# Patient Record
Sex: MALE | Race: WHITE | NOT HISPANIC OR LATINO | Employment: FULL TIME | URBAN - METROPOLITAN AREA
[De-identification: names, ages, dates, MRNs, and addresses within clinical notes are randomized per-mention and may not be internally consistent; named-entity substitution may affect disease eponyms.]

---

## 2017-02-10 ENCOUNTER — ALLSCRIPTS OFFICE VISIT (OUTPATIENT)
Dept: OTHER | Facility: OTHER | Age: 52
End: 2017-02-10

## 2017-02-20 ENCOUNTER — GENERIC CONVERSION - ENCOUNTER (OUTPATIENT)
Dept: OTHER | Facility: OTHER | Age: 52
End: 2017-02-20

## 2017-02-20 ENCOUNTER — ANESTHESIA (OUTPATIENT)
Dept: GASTROENTEROLOGY | Facility: HOSPITAL | Age: 52
End: 2017-02-20
Payer: COMMERCIAL

## 2017-02-20 ENCOUNTER — HOSPITAL ENCOUNTER (OUTPATIENT)
Facility: HOSPITAL | Age: 52
Setting detail: OUTPATIENT SURGERY
Discharge: HOME | End: 2017-02-20
Attending: INTERNAL MEDICINE | Admitting: INTERNAL MEDICINE
Payer: COMMERCIAL

## 2017-02-20 ENCOUNTER — ANESTHESIA EVENT (OUTPATIENT)
Dept: GASTROENTEROLOGY | Facility: HOSPITAL | Age: 52
End: 2017-02-20
Payer: COMMERCIAL

## 2017-02-20 VITALS
WEIGHT: 202.82 LBS | HEART RATE: 51 BPM | HEIGHT: 69 IN | RESPIRATION RATE: 16 BRPM | SYSTOLIC BLOOD PRESSURE: 113 MMHG | DIASTOLIC BLOOD PRESSURE: 70 MMHG | OXYGEN SATURATION: 98 % | BODY MASS INDEX: 30.04 KG/M2 | TEMPERATURE: 96.8 F

## 2017-02-20 DIAGNOSIS — Z80.0 FAMILY HISTORY OF MALIGNANT NEOPLASM OF DIGESTIVE ORGAN: ICD-10-CM

## 2017-02-20 PROCEDURE — 88305 TISSUE EXAM BY PATHOLOGIST: CPT | Performed by: INTERNAL MEDICINE

## 2017-02-20 RX ORDER — SODIUM CHLORIDE, SODIUM LACTATE, POTASSIUM CHLORIDE, CALCIUM CHLORIDE 600; 310; 30; 20 MG/100ML; MG/100ML; MG/100ML; MG/100ML
125 INJECTION, SOLUTION INTRAVENOUS CONTINUOUS
Status: DISCONTINUED | OUTPATIENT
Start: 2017-02-20 | End: 2017-02-20 | Stop reason: HOSPADM

## 2017-02-20 RX ORDER — AMOXICILLIN 500 MG
CAPSULE ORAL DAILY
COMMUNITY
End: 2017-08-10

## 2017-02-20 RX ORDER — LISINOPRIL 20 MG/1
10 TABLET ORAL
COMMUNITY
End: 2018-05-25 | Stop reason: ALTCHOICE

## 2017-02-20 RX ORDER — ATORVASTATIN CALCIUM 20 MG/1
20 TABLET, FILM COATED ORAL
COMMUNITY
End: 2018-07-05 | Stop reason: SDUPTHER

## 2017-02-20 RX ORDER — PROPOFOL 10 MG/ML
INJECTION, EMULSION INTRAVENOUS AS NEEDED
Status: DISCONTINUED | OUTPATIENT
Start: 2017-02-20 | End: 2017-02-20 | Stop reason: SURG

## 2017-02-20 RX ORDER — MEPERIDINE HYDROCHLORIDE 25 MG/ML
12.5 INJECTION INTRAMUSCULAR; INTRAVENOUS; SUBCUTANEOUS AS NEEDED
Status: DISCONTINUED | OUTPATIENT
Start: 2017-02-20 | End: 2017-02-20 | Stop reason: HOSPADM

## 2017-02-20 RX ORDER — LEVOTHYROXINE SODIUM 112 UG/1
112 TABLET ORAL
COMMUNITY
End: 2018-04-11 | Stop reason: SDUPTHER

## 2017-02-20 RX ORDER — LEVOCETIRIZINE DIHYDROCHLORIDE 5 MG/1
5 TABLET, FILM COATED ORAL EVERY EVENING
COMMUNITY
End: 2018-04-29 | Stop reason: SDUPTHER

## 2017-02-20 RX ORDER — GLUCOSA SU 2KCL/CHONDROITIN SU 500-400 MG
1 CAPSULE ORAL DAILY
COMMUNITY
End: 2017-08-10

## 2017-02-20 RX ORDER — ALBUTEROL SULFATE 2.5 MG/3ML
2.5 SOLUTION RESPIRATORY (INHALATION) ONCE AS NEEDED
Status: DISCONTINUED | OUTPATIENT
Start: 2017-02-20 | End: 2017-02-20 | Stop reason: HOSPADM

## 2017-02-20 RX ORDER — MAG HYDROX/ALUMINUM HYD/SIMETH 400-400-40
SUSPENSION, ORAL (FINAL DOSE FORM) ORAL DAILY
COMMUNITY
End: 2017-08-10

## 2017-02-20 RX ADMIN — PROPOFOL 30 MG: 10 INJECTION, EMULSION INTRAVENOUS at 11:21

## 2017-02-20 RX ADMIN — PROPOFOL 50 MG: 10 INJECTION, EMULSION INTRAVENOUS at 11:11

## 2017-02-20 RX ADMIN — SODIUM CHLORIDE, SODIUM LACTATE, POTASSIUM CHLORIDE, AND CALCIUM CHLORIDE 125 ML/HR: .6; .31; .03; .02 INJECTION, SOLUTION INTRAVENOUS at 10:33

## 2017-02-20 RX ADMIN — PROPOFOL 30 MG: 10 INJECTION, EMULSION INTRAVENOUS at 11:12

## 2017-02-20 RX ADMIN — PROPOFOL 150 MG: 10 INJECTION, EMULSION INTRAVENOUS at 11:09

## 2017-02-20 RX ADMIN — PROPOFOL 40 MG: 10 INJECTION, EMULSION INTRAVENOUS at 11:16

## 2017-03-17 ENCOUNTER — OFFICE VISIT (OUTPATIENT)
Dept: URGENT CARE | Facility: MEDICAL CENTER | Age: 52
End: 2017-03-17
Payer: COMMERCIAL

## 2017-03-17 PROCEDURE — 99213 OFFICE O/P EST LOW 20 MIN: CPT

## 2017-05-05 ENCOUNTER — GENERIC CONVERSION - ENCOUNTER (OUTPATIENT)
Dept: OTHER | Facility: OTHER | Age: 52
End: 2017-05-05

## 2017-05-05 LAB
A/G RATIO (HISTORICAL): 1.9 (ref 1.2–2.2)
ALBUMIN SERPL BCP-MCNC: 4.5 G/DL (ref 3.5–5.5)
ALP SERPL-CCNC: 76 IU/L (ref 39–117)
ALT SERPL W P-5'-P-CCNC: 71 IU/L (ref 0–44)
AMBIG ABBREV CMP14 DEFAULT (HISTORICAL): NORMAL
AST SERPL W P-5'-P-CCNC: 39 IU/L (ref 0–40)
BILIRUB SERPL-MCNC: 0.6 MG/DL (ref 0–1.2)
BUN SERPL-MCNC: 18 MG/DL (ref 6–24)
BUN/CREA RATIO (HISTORICAL): 15 (ref 9–20)
CALCIUM SERPL-MCNC: 9.8 MG/DL (ref 8.7–10.2)
CHLORIDE SERPL-SCNC: 102 MMOL/L (ref 96–106)
CHOLEST SERPL-MCNC: 134 MG/DL (ref 100–199)
CO2 SERPL-SCNC: 26 MMOL/L (ref 18–29)
CREAT SERPL-MCNC: 1.21 MG/DL (ref 0.76–1.27)
EGFR AFRICAN AMERICAN (HISTORICAL): 81 ML/MIN/1.73
EGFR-AMERICAN CALC (HISTORICAL): 70 ML/MIN/1.73
GLUCOSE SERPL-MCNC: 101 MG/DL (ref 65–99)
HDLC SERPL-MCNC: 38 MG/DL
LDLC SERPL CALC-MCNC: 71 MG/DL (ref 0–99)
POTASSIUM SERPL-SCNC: 4.5 MMOL/L (ref 3.5–5.2)
SODIUM SERPL-SCNC: 144 MMOL/L (ref 134–144)
TOT. GLOBULIN, SERUM (HISTORICAL): 2.4 G/DL (ref 1.5–4.5)
TOTAL PROTEIN (HISTORICAL): 6.9 G/DL (ref 6–8.5)
TRIGL SERPL-MCNC: 126 MG/DL (ref 0–149)

## 2017-05-06 LAB — 25(OH)D3 SERPL-MCNC: 61.2 NG/ML (ref 30–100)

## 2017-05-08 ENCOUNTER — GENERIC CONVERSION - ENCOUNTER (OUTPATIENT)
Dept: OTHER | Facility: OTHER | Age: 52
End: 2017-05-08

## 2017-05-12 ENCOUNTER — GENERIC CONVERSION - ENCOUNTER (OUTPATIENT)
Dept: OTHER | Facility: OTHER | Age: 52
End: 2017-05-12

## 2017-05-12 DIAGNOSIS — E03.9 HYPOTHYROIDISM: ICD-10-CM

## 2017-05-12 DIAGNOSIS — E55.9 VITAMIN D DEFICIENCY: ICD-10-CM

## 2017-05-13 LAB — TSH SERPL DL<=0.05 MIU/L-ACNC: 2.18 UIU/ML (ref 0.45–4.5)

## 2017-05-15 ENCOUNTER — GENERIC CONVERSION - ENCOUNTER (OUTPATIENT)
Dept: OTHER | Facility: OTHER | Age: 52
End: 2017-05-15

## 2017-05-16 ENCOUNTER — ALLSCRIPTS OFFICE VISIT (OUTPATIENT)
Dept: OTHER | Facility: OTHER | Age: 52
End: 2017-05-16

## 2017-07-21 ENCOUNTER — DOCTOR'S OFFICE (OUTPATIENT)
Dept: URBAN - METROPOLITAN AREA CLINIC 137 | Facility: CLINIC | Age: 52
Setting detail: OPHTHALMOLOGY
End: 2017-07-21
Payer: COMMERCIAL

## 2017-07-21 DIAGNOSIS — H52.4: ICD-10-CM

## 2017-07-21 PROCEDURE — 92004 COMPRE OPH EXAM NEW PT 1/>: CPT | Performed by: OPHTHALMOLOGY

## 2017-07-21 ASSESSMENT — REFRACTION_MANIFEST
OS_VA1: 20/
OS_VA2: 20/
OU_VA: 20/
OD_VA2: 20/
OD_VA2: 20/
OS_VA2: 20/
OU_VA: 20/
OD_VA1: 20/
OD_VA3: 20/
OD_VA1: 20/
OD_VA3: 20/
OS_VA3: 20/
OS_VA3: 20/
OS_VA1: 20/

## 2017-07-21 ASSESSMENT — CONFRONTATIONAL VISUAL FIELD TEST (CVF)
OS_FINDINGS: FULL
OD_FINDINGS: FULL

## 2017-07-21 ASSESSMENT — REFRACTION_OUTSIDERX
OD_VA1: 20/20
OS_SPHERE: +0.25
OS_VA2: 20/20(J1+)
OS_CYLINDER: SPH
OD_VA2: 20/20(J1+)
OU_VA: 20/
OD_VA3: 20/
OD_ADD: +1.50
OD_SPHERE: PLANO
OS_VA1: 20/20
OS_VA3: 20/
OS_ADD: +1.50
OD_CYLINDER: SPH

## 2017-07-21 ASSESSMENT — REFRACTION_AUTOREFRACTION
OD_SPHERE: PLANO
OS_CYLINDER: SPH
OD_CYLINDER: +0.25
OD_AXIS: 013
OS_SPHERE: +0.25

## 2017-07-21 ASSESSMENT — REFRACTION_CURRENTRX
OS_SPHERE: +1.50
OD_SPHERE: +1.50
OS_VPRISM_DIRECTION: SV
OD_OVR_VA: 20/
OS_OVR_VA: 20/
OD_OVR_VA: 20/
OD_VPRISM_DIRECTION: SV
OS_CYLINDER: SPH
OD_CYLINDER: SPH
OS_OVR_VA: 20/
OD_OVR_VA: 20/
OS_OVR_VA: 20/

## 2017-07-21 ASSESSMENT — VISUAL ACUITY
OD_BCVA: 20/20
OS_BCVA: 20/20-2

## 2017-08-10 ENCOUNTER — HOSPITAL ENCOUNTER (EMERGENCY)
Facility: HOSPITAL | Age: 52
Discharge: HOME | End: 2017-08-11
Attending: EMERGENCY MEDICINE | Admitting: EMERGENCY MEDICINE
Payer: COMMERCIAL

## 2017-08-10 VITALS
HEART RATE: 69 BPM | OXYGEN SATURATION: 96 % | SYSTOLIC BLOOD PRESSURE: 145 MMHG | BODY MASS INDEX: 28.94 KG/M2 | RESPIRATION RATE: 20 BRPM | DIASTOLIC BLOOD PRESSURE: 86 MMHG | TEMPERATURE: 97.9 F | WEIGHT: 196 LBS

## 2017-08-10 DIAGNOSIS — S46.211A RUPTURE OF RIGHT DISTAL BICEPS TENDON: Primary | ICD-10-CM

## 2017-08-11 ENCOUNTER — TRANSCRIBE ORDERS (OUTPATIENT)
Dept: ADMINISTRATIVE | Facility: HOSPITAL | Age: 52
End: 2017-08-11

## 2017-08-11 ENCOUNTER — APPOINTMENT (EMERGENCY)
Dept: RADIOLOGY | Facility: HOSPITAL | Age: 52
End: 2017-08-11
Payer: COMMERCIAL

## 2017-08-11 ENCOUNTER — ALLSCRIPTS OFFICE VISIT (OUTPATIENT)
Dept: OTHER | Facility: OTHER | Age: 52
End: 2017-08-11

## 2017-08-11 DIAGNOSIS — S46.211A TRAUMATIC PARTIAL TEAR OF RIGHT BICEPS TENDON, INITIAL ENCOUNTER: Primary | ICD-10-CM

## 2017-08-11 DIAGNOSIS — S46.211A STRAIN OF MUSCLE, FASCIA AND TENDON OF OTHER PARTS OF BICEPS, RIGHT ARM, INITIAL ENCOUNTER: ICD-10-CM

## 2017-08-11 PROCEDURE — 99283 EMERGENCY DEPT VISIT LOW MDM: CPT

## 2017-08-11 PROCEDURE — 73090 X-RAY EXAM OF FOREARM: CPT

## 2017-08-11 PROCEDURE — 73080 X-RAY EXAM OF ELBOW: CPT

## 2017-08-11 RX ORDER — IBUPROFEN 600 MG/1
600 TABLET ORAL ONCE
Status: COMPLETED | OUTPATIENT
Start: 2017-08-11 | End: 2017-08-11

## 2017-08-11 RX ORDER — HYDROCODONE BITARTRATE AND ACETAMINOPHEN 5; 325 MG/1; MG/1
1 TABLET ORAL EVERY 6 HOURS PRN
Qty: 15 TABLET | Refills: 0 | Status: SHIPPED | OUTPATIENT
Start: 2017-08-11 | End: 2017-08-21

## 2017-08-11 RX ORDER — IBUPROFEN 800 MG/1
800 TABLET ORAL 3 TIMES DAILY
Qty: 21 TABLET | Refills: 0 | Status: SHIPPED | OUTPATIENT
Start: 2017-08-11 | End: 2018-09-27 | Stop reason: ALTCHOICE

## 2017-08-11 RX ADMIN — IBUPROFEN 600 MG: 600 TABLET ORAL at 00:10

## 2017-08-17 ENCOUNTER — HOSPITAL ENCOUNTER (OUTPATIENT)
Dept: RADIOLOGY | Age: 52
Discharge: HOME | End: 2017-08-17
Payer: COMMERCIAL

## 2017-08-17 DIAGNOSIS — S46.211A STRAIN OF MUSCLE, FASCIA AND TENDON OF OTHER PARTS OF BICEPS, RIGHT ARM, INITIAL ENCOUNTER: ICD-10-CM

## 2017-08-17 PROCEDURE — 73221 MRI JOINT UPR EXTREM W/O DYE: CPT

## 2017-08-25 ENCOUNTER — ALLSCRIPTS OFFICE VISIT (OUTPATIENT)
Dept: OTHER | Facility: OTHER | Age: 52
End: 2017-08-25

## 2017-08-31 ENCOUNTER — HOSPITAL ENCOUNTER (OUTPATIENT)
Facility: HOSPITAL | Age: 52
Setting detail: OUTPATIENT SURGERY
Discharge: HOME | End: 2017-08-31
Attending: ORTHOPAEDIC SURGERY | Admitting: ORTHOPAEDIC SURGERY
Payer: COMMERCIAL

## 2017-08-31 ENCOUNTER — ANESTHESIA (OUTPATIENT)
Dept: PERIOP | Facility: HOSPITAL | Age: 52
End: 2017-08-31
Payer: COMMERCIAL

## 2017-08-31 ENCOUNTER — APPOINTMENT (OUTPATIENT)
Dept: RADIOLOGY | Facility: HOSPITAL | Age: 52
End: 2017-08-31
Payer: COMMERCIAL

## 2017-08-31 ENCOUNTER — ANESTHESIA EVENT (OUTPATIENT)
Dept: PERIOP | Facility: HOSPITAL | Age: 52
End: 2017-08-31
Payer: COMMERCIAL

## 2017-08-31 VITALS
SYSTOLIC BLOOD PRESSURE: 123 MMHG | DIASTOLIC BLOOD PRESSURE: 74 MMHG | HEIGHT: 70 IN | HEART RATE: 77 BPM | TEMPERATURE: 97.6 F | OXYGEN SATURATION: 96 % | WEIGHT: 194 LBS | RESPIRATION RATE: 17 BRPM | BODY MASS INDEX: 27.77 KG/M2

## 2017-08-31 PROCEDURE — 73070 X-RAY EXAM OF ELBOW: CPT

## 2017-08-31 PROCEDURE — C1713 ANCHOR/SCREW BN/BN,TIS/BN: HCPCS | Performed by: ORTHOPAEDIC SURGERY

## 2017-08-31 DEVICE — SYSTEM IMPLANT DSTL BICEP REPAIR: Type: IMPLANTABLE DEVICE | Status: FUNCTIONAL

## 2017-08-31 RX ORDER — FENTANYL CITRATE/PF 50 MCG/ML
50 SYRINGE (ML) INJECTION
Status: COMPLETED | OUTPATIENT
Start: 2017-08-31 | End: 2017-08-31

## 2017-08-31 RX ORDER — KETOROLAC TROMETHAMINE 30 MG/ML
INJECTION, SOLUTION INTRAMUSCULAR; INTRAVENOUS AS NEEDED
Status: DISCONTINUED | OUTPATIENT
Start: 2017-08-31 | End: 2017-08-31 | Stop reason: SURG

## 2017-08-31 RX ORDER — EPHEDRINE SULFATE 50 MG/ML
INJECTION, SOLUTION INTRAVENOUS AS NEEDED
Status: DISCONTINUED | OUTPATIENT
Start: 2017-08-31 | End: 2017-08-31 | Stop reason: SURG

## 2017-08-31 RX ORDER — MORPHINE SULFATE 2 MG/ML
2 INJECTION, SOLUTION INTRAMUSCULAR; INTRAVENOUS EVERY 2 HOUR PRN
Status: DISCONTINUED | OUTPATIENT
Start: 2017-08-31 | End: 2017-08-31 | Stop reason: HOSPADM

## 2017-08-31 RX ORDER — LIDOCAINE HYDROCHLORIDE 10 MG/ML
INJECTION, SOLUTION INFILTRATION; PERINEURAL AS NEEDED
Status: DISCONTINUED | OUTPATIENT
Start: 2017-08-31 | End: 2017-08-31 | Stop reason: SURG

## 2017-08-31 RX ORDER — OXYCODONE HYDROCHLORIDE AND ACETAMINOPHEN 5; 325 MG/1; MG/1
TABLET ORAL
Qty: 40 TABLET | Refills: 0 | Status: SHIPPED | OUTPATIENT
Start: 2017-08-31 | End: 2018-05-25 | Stop reason: ALTCHOICE

## 2017-08-31 RX ORDER — ONDANSETRON 2 MG/ML
4 INJECTION INTRAMUSCULAR; INTRAVENOUS EVERY 6 HOURS PRN
Status: DISCONTINUED | OUTPATIENT
Start: 2017-08-31 | End: 2017-08-31 | Stop reason: HOSPADM

## 2017-08-31 RX ORDER — ONDANSETRON 2 MG/ML
INJECTION INTRAMUSCULAR; INTRAVENOUS AS NEEDED
Status: DISCONTINUED | OUTPATIENT
Start: 2017-08-31 | End: 2017-08-31 | Stop reason: SURG

## 2017-08-31 RX ORDER — FENTANYL CITRATE 50 UG/ML
INJECTION, SOLUTION INTRAMUSCULAR; INTRAVENOUS AS NEEDED
Status: DISCONTINUED | OUTPATIENT
Start: 2017-08-31 | End: 2017-08-31 | Stop reason: SURG

## 2017-08-31 RX ORDER — PROPOFOL 10 MG/ML
INJECTION, EMULSION INTRAVENOUS AS NEEDED
Status: DISCONTINUED | OUTPATIENT
Start: 2017-08-31 | End: 2017-08-31 | Stop reason: SURG

## 2017-08-31 RX ORDER — MIDAZOLAM HYDROCHLORIDE 1 MG/ML
INJECTION INTRAMUSCULAR; INTRAVENOUS AS NEEDED
Status: DISCONTINUED | OUTPATIENT
Start: 2017-08-31 | End: 2017-08-31 | Stop reason: SURG

## 2017-08-31 RX ORDER — OXYCODONE HYDROCHLORIDE AND ACETAMINOPHEN 5; 325 MG/1; MG/1
TABLET ORAL
Status: COMPLETED
Start: 2017-08-31 | End: 2017-08-31

## 2017-08-31 RX ORDER — OXYCODONE HYDROCHLORIDE AND ACETAMINOPHEN 5; 325 MG/1; MG/1
2 TABLET ORAL EVERY 4 HOURS PRN
Status: DISCONTINUED | OUTPATIENT
Start: 2017-08-31 | End: 2017-08-31 | Stop reason: HOSPADM

## 2017-08-31 RX ORDER — SODIUM CHLORIDE, SODIUM LACTATE, POTASSIUM CHLORIDE, CALCIUM CHLORIDE 600; 310; 30; 20 MG/100ML; MG/100ML; MG/100ML; MG/100ML
INJECTION, SOLUTION INTRAVENOUS CONTINUOUS PRN
Status: DISCONTINUED | OUTPATIENT
Start: 2017-08-31 | End: 2017-08-31 | Stop reason: SURG

## 2017-08-31 RX ORDER — ACETAMINOPHEN 325 MG/1
650 TABLET ORAL EVERY 4 HOURS PRN
Status: DISCONTINUED | OUTPATIENT
Start: 2017-08-31 | End: 2017-08-31 | Stop reason: HOSPADM

## 2017-08-31 RX ORDER — ONDANSETRON 2 MG/ML
4 INJECTION INTRAMUSCULAR; INTRAVENOUS ONCE AS NEEDED
Status: DISCONTINUED | OUTPATIENT
Start: 2017-08-31 | End: 2017-08-31 | Stop reason: HOSPADM

## 2017-08-31 RX ADMIN — DEXAMETHASONE SODIUM PHOSPHATE 4 MG: 10 INJECTION INTRAMUSCULAR; INTRAVENOUS at 14:50

## 2017-08-31 RX ADMIN — OXYCODONE HYDROCHLORIDE AND ACETAMINOPHEN 2 TABLET: 5; 325 TABLET ORAL at 17:41

## 2017-08-31 RX ADMIN — SODIUM CHLORIDE, SODIUM LACTATE, POTASSIUM CHLORIDE, AND CALCIUM CHLORIDE: .6; .31; .03; .02 INJECTION, SOLUTION INTRAVENOUS at 13:50

## 2017-08-31 RX ADMIN — FENTANYL CITRATE 50 MCG: 50 INJECTION INTRAMUSCULAR; INTRAVENOUS at 16:30

## 2017-08-31 RX ADMIN — ONDANSETRON 4 MG: 2 INJECTION INTRAMUSCULAR; INTRAVENOUS at 15:59

## 2017-08-31 RX ADMIN — PROPOFOL 200 MG: 10 INJECTION, EMULSION INTRAVENOUS at 14:46

## 2017-08-31 RX ADMIN — LIDOCAINE HYDROCHLORIDE 50 MG: 10 INJECTION, SOLUTION INFILTRATION; PERINEURAL at 14:46

## 2017-08-31 RX ADMIN — CEFAZOLIN SODIUM 2000 MG: 2 SOLUTION INTRAVENOUS at 14:50

## 2017-08-31 RX ADMIN — EPHEDRINE SULFATE 5 MG: 50 INJECTION, SOLUTION INTRAMUSCULAR; INTRAVENOUS; SUBCUTANEOUS at 14:50

## 2017-08-31 RX ADMIN — MIDAZOLAM HYDROCHLORIDE 2 MG: 1 INJECTION, SOLUTION INTRAMUSCULAR; INTRAVENOUS at 14:43

## 2017-08-31 RX ADMIN — HYDROMORPHONE HYDROCHLORIDE 0.5 MG: 1 INJECTION, SOLUTION INTRAMUSCULAR; INTRAVENOUS; SUBCUTANEOUS at 15:06

## 2017-08-31 RX ADMIN — EPHEDRINE SULFATE 5 MG: 50 INJECTION, SOLUTION INTRAMUSCULAR; INTRAVENOUS; SUBCUTANEOUS at 14:48

## 2017-08-31 RX ADMIN — FENTANYL CITRATE 50 MCG: 50 INJECTION INTRAMUSCULAR; INTRAVENOUS at 14:46

## 2017-08-31 RX ADMIN — HYDROMORPHONE HYDROCHLORIDE 0.4 MG: 1 INJECTION, SOLUTION INTRAMUSCULAR; INTRAVENOUS; SUBCUTANEOUS at 16:40

## 2017-08-31 RX ADMIN — FENTANYL CITRATE 50 MCG: 50 INJECTION INTRAMUSCULAR; INTRAVENOUS at 14:59

## 2017-08-31 RX ADMIN — HYDROMORPHONE HYDROCHLORIDE 0.5 MG: 1 INJECTION, SOLUTION INTRAMUSCULAR; INTRAVENOUS; SUBCUTANEOUS at 15:21

## 2017-08-31 RX ADMIN — FENTANYL CITRATE 50 MCG: 50 INJECTION INTRAMUSCULAR; INTRAVENOUS at 16:25

## 2017-08-31 RX ADMIN — KETOROLAC TROMETHAMINE 30 MG: 30 INJECTION, SOLUTION INTRAMUSCULAR at 16:00

## 2017-09-12 ENCOUNTER — ALLSCRIPTS OFFICE VISIT (OUTPATIENT)
Dept: OTHER | Facility: OTHER | Age: 52
End: 2017-09-12

## 2017-09-12 DIAGNOSIS — S46.211D STRAIN OF MUSCLE, FASCIA AND TENDON OF OTHER PARTS OF BICEPS, RIGHT ARM, SUBSEQUENT ENCOUNTER: ICD-10-CM

## 2017-10-03 ENCOUNTER — APPOINTMENT (OUTPATIENT)
Dept: PHYSICAL THERAPY | Facility: MEDICAL CENTER | Age: 52
End: 2017-10-03
Payer: COMMERCIAL

## 2017-10-03 DIAGNOSIS — S46.211D STRAIN OF MUSCLE, FASCIA AND TENDON OF OTHER PARTS OF BICEPS, RIGHT ARM, SUBSEQUENT ENCOUNTER: ICD-10-CM

## 2017-10-03 PROCEDURE — G8991 OTHER PT/OT GOAL STATUS: HCPCS

## 2017-10-03 PROCEDURE — 97161 PT EVAL LOW COMPLEX 20 MIN: CPT

## 2017-10-03 PROCEDURE — G8990 OTHER PT/OT CURRENT STATUS: HCPCS

## 2017-10-05 ENCOUNTER — GENERIC CONVERSION - ENCOUNTER (OUTPATIENT)
Dept: OTHER | Facility: OTHER | Age: 52
End: 2017-10-05

## 2017-10-13 ENCOUNTER — ALLSCRIPTS OFFICE VISIT (OUTPATIENT)
Dept: OTHER | Facility: OTHER | Age: 52
End: 2017-10-13

## 2017-10-13 ENCOUNTER — APPOINTMENT (OUTPATIENT)
Dept: PHYSICAL THERAPY | Facility: MEDICAL CENTER | Age: 52
End: 2017-10-13
Payer: COMMERCIAL

## 2017-10-14 NOTE — PROGRESS NOTES
Assessment  1  Traumatic rupture of right distal biceps tendon, subsequent encounter (V58 89,840 8)   (S22 794O)    Discussion/Summary    Right distal biceps tendon repair 8/31/17  physical therapy  continued resolution of nerve injury  from elbow brace as tolerated over the next 2 weeks  in 5-6 weeks  Chief Complaint  1  Arm Pain    Post-Op  HPI: Follow-up right elbow after distal biceps tendon repair on 8/31/17  Patient reports resolving numbness in his forearm  He describes occasional pain in the distal biceps  He also describes a spongy feeling in his forearm muscles  He is participating in physical therapy with continued gradual improvement  He continues to wear the brace including at night  Active Problems  1  Benign essential hypertension (401 1) (I10)   2  Colon cancer screening (V76 51) (Z12 11)   3  Essential hypertriglyceridemia (272 1) (E78 1)   4  Family history of colon cancer (V16 0) (Z80 0)   5  History of allergy (V15 09) (Z88 9)   6  Hypercholesterolemia (272 0) (E78 00)   7  Hypothyroidism (244 9) (E03 9)   8  Need for DTP vaccine (V06 1) (Z23)   9  Need for prophylactic vaccination and inoculation against influenza (V04 81) (Z23)   10  Need for revaccination (V05 9) (Z23)   11  Prostate cancer screening (V76 44) (Z12 5)   12  Psoriasis (696 1) (L40 9)   13  Traumatic rupture of right distal biceps tendon, subsequent encounter (V58 89,840 8)    (S46 211D)   14  Vitamin D deficiency (268 9) (E55 9)   15  Well adult on routine health check (V70 0) (Z00 00)    Social History   · Denied: History of Alcohol Use (History)   · Denied: History of Drug Use   · Never A Smoker    Current Meds   1  Atorvastatin Calcium 20 MG Oral Tablet; Take 1 tablet by mouth at bedtime; Therapy: 74VCU0975 to (Evaluate:02Jan2018)  Requested for: 33NXJ6091; Last   Rx:30Lex1049 Ordered   2  Coenzyme Q10 100 MG Oral Capsule; Therapy: (Recorded:49Jfc8148) to Recorded   3  Daily Multivitamin TABS;    Therapy: (Recorded:81Wef9210) to Recorded   4  Iron TABS; Therapy: (Recorded:24Nvp0046) to Recorded   5  Levocetirizine Dihydrochloride 5 MG Oral Tablet; TAKE 1 TABLET DAILY IN THE   EVENING; Therapy: 78VZQ7551 to Last Kiki)  Requested for: 54IBM5516; Last   Rx:02Oct2017 Ordered   6  Levothyroxine Sodium 112 MCG Oral Tablet; take 1 tablet by mouth every day; Therapy: 12KJD5944 to (Evaluate:31Mar2018)  Requested for: 02Oct2017; Last   Rx:02Oct2017 Ordered   7  Lisinopril 10 MG Oral Tablet; TAKE 1 TABLET DAILY; Therapy: 22XAO0884 to (Evaluate:62Wuh0229)  Requested for: 51QWM0220; Last   Rx:66Pfe4260 Ordered   8  [place]-3 Fish Oil 1200 MG Oral Capsule; TAKE 1 CAPSULE Daily; Therapy: 48KLX4501 to (Evaluate:58Pak9562) Recorded   9  Ventolin  (90 Base) MCG/ACT Inhalation Aerosol Solution; INHALE 2 PUFFS   EVERY 4-6 HOURS AS NEEDED; Therapy: 17Rfx5493 to (Last Rx:21Apr2015)  Requested for: 24Kos4822 Ordered   10  Vitamin D 1000 UNIT CAPS; Therapy: (Recorded:16Kcn2633) to Recorded    Allergies  1  No Known Drug Allergies  2  Dust   3  Other    Vitals   Recorded: 53WSB2505 08:33AM   Heart Rate 64   Systolic 456   Diastolic 79   Height 5 ft 9 7 in   Weight 202 lb    BMI Calculated 29 23   BSA Calculated 2 09     Physical Exam    Right elbow:  Incision is healed  No gross deformity  Nontender in the distal biceps tendon which is intact to palpation  Full range of motion  Decreased sensation lateral antebrachial cutaneous nerve distribution, with interval improvement since 9/12/17   2+ radial pulse          Future Appointments    Date/Time Provider Specialty Site   11/27/2017 08:00 AM Ibrahima Luis DO Family Medicine [facility]     Signatures   Electronically signed by : ENEIDA Helm ; Oct 13 2017  8:41AM EST                       (Author)

## 2017-10-16 ENCOUNTER — APPOINTMENT (OUTPATIENT)
Dept: PHYSICAL THERAPY | Facility: MEDICAL CENTER | Age: 52
End: 2017-10-16
Payer: COMMERCIAL

## 2017-10-20 ENCOUNTER — APPOINTMENT (OUTPATIENT)
Dept: PHYSICAL THERAPY | Facility: MEDICAL CENTER | Age: 52
End: 2017-10-20
Payer: COMMERCIAL

## 2017-10-20 PROCEDURE — 97140 MANUAL THERAPY 1/> REGIONS: CPT

## 2017-10-20 PROCEDURE — 97110 THERAPEUTIC EXERCISES: CPT

## 2017-10-27 ENCOUNTER — APPOINTMENT (OUTPATIENT)
Dept: PHYSICAL THERAPY | Facility: MEDICAL CENTER | Age: 52
End: 2017-10-27
Payer: COMMERCIAL

## 2017-10-27 PROCEDURE — 97110 THERAPEUTIC EXERCISES: CPT

## 2017-10-27 PROCEDURE — 97140 MANUAL THERAPY 1/> REGIONS: CPT

## 2017-11-03 ENCOUNTER — APPOINTMENT (OUTPATIENT)
Dept: PHYSICAL THERAPY | Facility: MEDICAL CENTER | Age: 52
End: 2017-11-03
Payer: COMMERCIAL

## 2017-11-03 PROCEDURE — 97140 MANUAL THERAPY 1/> REGIONS: CPT

## 2017-11-03 PROCEDURE — 97110 THERAPEUTIC EXERCISES: CPT

## 2017-11-10 ENCOUNTER — APPOINTMENT (OUTPATIENT)
Dept: PHYSICAL THERAPY | Facility: MEDICAL CENTER | Age: 52
End: 2017-11-10
Payer: COMMERCIAL

## 2017-11-10 PROCEDURE — 97140 MANUAL THERAPY 1/> REGIONS: CPT

## 2017-11-10 PROCEDURE — 97110 THERAPEUTIC EXERCISES: CPT

## 2017-11-21 ENCOUNTER — GENERIC CONVERSION - ENCOUNTER (OUTPATIENT)
Dept: OTHER | Facility: OTHER | Age: 52
End: 2017-11-21

## 2017-11-24 ENCOUNTER — ALLSCRIPTS OFFICE VISIT (OUTPATIENT)
Dept: OTHER | Facility: OTHER | Age: 52
End: 2017-11-24

## 2017-11-24 ENCOUNTER — APPOINTMENT (OUTPATIENT)
Dept: PHYSICAL THERAPY | Facility: MEDICAL CENTER | Age: 52
End: 2017-11-24
Payer: COMMERCIAL

## 2017-11-24 ENCOUNTER — GENERIC CONVERSION - ENCOUNTER (OUTPATIENT)
Dept: OTHER | Facility: OTHER | Age: 52
End: 2017-11-24

## 2017-11-24 LAB
A/G RATIO (HISTORICAL): 2 (ref 1.2–2.2)
ALBUMIN SERPL BCP-MCNC: 4.8 G/DL (ref 3.5–5.5)
ALP SERPL-CCNC: 76 IU/L (ref 39–117)
ALT SERPL W P-5'-P-CCNC: 51 IU/L (ref 0–44)
AST SERPL W P-5'-P-CCNC: 29 IU/L (ref 0–40)
BILIRUB SERPL-MCNC: 0.7 MG/DL (ref 0–1.2)
BUN SERPL-MCNC: 17 MG/DL (ref 6–24)
BUN/CREA RATIO (HISTORICAL): 15 (ref 9–20)
CALCIUM SERPL-MCNC: 9.8 MG/DL (ref 8.7–10.2)
CHLORIDE SERPL-SCNC: 99 MMOL/L (ref 96–106)
CHOLEST SERPL-MCNC: 168 MG/DL (ref 100–199)
CREAT SERPL-MCNC: 1.1 MG/DL (ref 0.76–1.27)
EGFR AFRICAN AMERICAN (HISTORICAL): 90 ML/MIN/1.73
EGFR-AMERICAN CALC (HISTORICAL): 78 ML/MIN/1.73
GLUCOSE SERPL-MCNC: 101 MG/DL (ref 65–99)
HDLC SERPL-MCNC: 48 MG/DL
LDL/HDL RATIO (HISTORICAL): 2.1 RATIO UNITS (ref 0–3.6)
LDLC SERPL CALC-MCNC: 99 MG/DL (ref 0–99)
POTASSIUM SERPL-SCNC: 4.4 MMOL/L (ref 3.5–5.2)
PROSTATE SPECIFIC ANTIGEN (HISTORICAL): 0.9 NG/ML (ref 0–4)
REFLEX CRITERIA (HISTORICAL): NORMAL
SODIUM SERPL-SCNC: 142 MMOL/L (ref 134–144)
TOT. GLOBULIN, SERUM (HISTORICAL): 2.4 G/DL (ref 1.5–4.5)
TOTAL PROTEIN (HISTORICAL): 7.2 G/DL (ref 6–8.5)
TRIGL SERPL-MCNC: 106 MG/DL (ref 0–149)
VLDLC SERPL CALC-MCNC: 21 MG/DL (ref 5–40)

## 2017-11-24 PROCEDURE — 97110 THERAPEUTIC EXERCISES: CPT

## 2017-11-24 PROCEDURE — 97140 MANUAL THERAPY 1/> REGIONS: CPT

## 2017-11-25 LAB — TSH SERPL DL<=0.05 MIU/L-ACNC: 2.9 UIU/ML (ref 0.45–4.5)

## 2017-11-27 ENCOUNTER — GENERIC CONVERSION - ENCOUNTER (OUTPATIENT)
Dept: OTHER | Facility: OTHER | Age: 52
End: 2017-11-27

## 2017-12-01 ENCOUNTER — APPOINTMENT (OUTPATIENT)
Dept: PHYSICAL THERAPY | Facility: MEDICAL CENTER | Age: 52
End: 2017-12-01
Payer: COMMERCIAL

## 2017-12-01 PROCEDURE — G8990 OTHER PT/OT CURRENT STATUS: HCPCS

## 2017-12-01 PROCEDURE — G8991 OTHER PT/OT GOAL STATUS: HCPCS

## 2017-12-01 PROCEDURE — 97110 THERAPEUTIC EXERCISES: CPT

## 2017-12-01 PROCEDURE — 97140 MANUAL THERAPY 1/> REGIONS: CPT

## 2017-12-08 ENCOUNTER — APPOINTMENT (OUTPATIENT)
Dept: PHYSICAL THERAPY | Facility: MEDICAL CENTER | Age: 52
End: 2017-12-08
Payer: COMMERCIAL

## 2017-12-08 PROCEDURE — 97110 THERAPEUTIC EXERCISES: CPT

## 2017-12-08 PROCEDURE — 97140 MANUAL THERAPY 1/> REGIONS: CPT

## 2017-12-15 ENCOUNTER — APPOINTMENT (OUTPATIENT)
Dept: PHYSICAL THERAPY | Facility: MEDICAL CENTER | Age: 52
End: 2017-12-15
Payer: COMMERCIAL

## 2017-12-18 ENCOUNTER — APPOINTMENT (OUTPATIENT)
Dept: PHYSICAL THERAPY | Facility: MEDICAL CENTER | Age: 52
End: 2017-12-18
Payer: COMMERCIAL

## 2017-12-18 PROCEDURE — 97140 MANUAL THERAPY 1/> REGIONS: CPT

## 2017-12-18 PROCEDURE — 97110 THERAPEUTIC EXERCISES: CPT

## 2017-12-22 ENCOUNTER — APPOINTMENT (OUTPATIENT)
Dept: PHYSICAL THERAPY | Facility: MEDICAL CENTER | Age: 52
End: 2017-12-22
Payer: COMMERCIAL

## 2018-01-09 NOTE — RESULT NOTES
Verified Results  (1) COMPREHENSIVE METABOLIC PANEL [id number] 25:84YM Mildred Rubin     Test Name Result Flag Reference   Glucose, Serum 94 mg/dL  65-99   BUN 18 mg/dL  6-24   Creatinine, Serum 1 07 mg/dL  0 76-1 27   eGFR If NonAfricn Am 82 mL/min/1 73  >59   eGFR If Africn Am 94 mL/min/1 73  >59   BUN/Creatinine Ratio 17  9-20   Sodium, Serum 139 mmol/L  134-144   Potassium, Serum 4 5 mmol/L  3 5-5 2   Chloride, Serum 100 mmol/L     Carbon Dioxide, Total 24 mmol/L  18-29   Calcium, Serum 9 8 mg/dL  8 7-10 2   Protein, Total, Serum 6 8 g/dL  6 0-8 5   Albumin, Serum 4 8 g/dL  3 5-5 5   Globulin, Total 2 0 g/dL  1 5-4 5   A/G Ratio 2 4  1 1-2 5   Bilirubin, Total 0 5 mg/dL  0 0-1 2   Alkaline Phosphatase, S 69 IU/L     AST (SGOT) 30 IU/L  0-40   ALT (SGPT) 53 IU/L H 0-44     (LC) Lipid Panel [id number] 02:02PM Mildred Rubin     Test Name Result Flag Reference   Cholesterol, Total 177 mg/dL  100-199   Triglycerides 326 mg/dL H 0-149   HDL Cholesterol 37 mg/dL L >39   According to ATP-III Guidelines, HDL-C >59 mg/dL is considered a  negative risk factor for CHD  VLDL Cholesterol [place] 65 mg/dL H 5-40   LDL Cholesterol Calc 75 mg/dL  0-99     [facility]) Antelmo Cherry CMP14 Default [id number] 02:02PM Mildred Rubin     Test Name Result Flag Reference   Antelmo Cherry CMP14 Default Comment     A hand-written panel/profile was received from your office  In  accordance with the [facility] Ambiguous Test Code Policy dated July 7821, we have completed your order by using the closest currently  or formerly recognized AMA panel  We have assigned Comprehensive  Metabolic Panel (14), Test Code #021601 to this request   If this  is not the testing you wished to receive on this specimen, please  contact the [facility] Client Inquiry/Technical Services Department  to clarify the test order  We appreciate your business       [facility]) Antelmo Cherry LP Default [id number] 02:02PM Mildred Rubin     Test Name Result Flag Reference   Antelmo Cherry LP Default Comment     A hand-written panel/profile was received from your office  In  accordance with the [facility] Ambiguous Test Code Policy dated July 3217, we have completed your order by using the closest currently  or formerly recognized AMA panel  We have assigned Lipid Panel,  Test Code #633871 to this request  If this is not the testing you  wished to receive on this specimen, please contact the [facility]  Client Inquiry/Technical Services Department to clarify the test  order  We appreciate your business  Discussion/Summary   HIGH TRIGLYCERIDES LEVEL   WATCH SWEETS AND FATTY FOOD   NORMAL KIDNEY FUNCTION    ONE OF THE LIVER FUNCTION SLIGHTLY HIGH- COULD BE DUE TO YOUR CHOLESTEROL MEDICATION BUT ITS NOT SIGNIFICANTLY HIGH   REPEAT BLOOD WORK IN NOVEMBER 2016 AS ORDERED   - DR KEARNEY

## 2018-01-10 NOTE — RESULT NOTES
Discussion/Summary   OVERALL VERY GOOD     [facility]     Verified Results  ([facility]) [facility] 12+1AC [id number] 08:57AM Mitzi Men     Test Name Result Flag Reference   Glucose, Serum 101 mg/dL H 65-99   BUN 17 mg/dL  6-24   Creatinine, Serum 1 10 mg/dL  0 76-1 27   eGFR If NonAfricn Am 78 mL/min/1 73  >59   eGFR If Africn Am 90 mL/min/1 73  >59   BUN/Creatinine Ratio 15  9-20   Sodium, Serum 142 mmol/L  134-144   Potassium, Serum 4 4 mmol/L  3 5-5 2   Chloride, Serum 99 mmol/L     Calcium, Serum 9 8 mg/dL  8 7-10 2   Protein, Total, Serum 7 2 g/dL  6 0-8 5   Albumin, Serum 4 8 g/dL  3 5-5 5   Globulin, Total 2 4 g/dL  1 5-4 5   A/G Ratio 2 0  1 2-2 2   Bilirubin, Total 0 7 mg/dL  0 0-1 2   Alkaline Phosphatase, S 76 IU/L     AST (SGOT) 29 IU/L  0-40   ALT (SGPT) 51 IU/L H 0-44     (LC) Lipid Panel With LDL/HDL Ratio [id number] 08:57AM Mitzi Men     Test Name Result Flag Reference   Cholesterol, Total 168 mg/dL  100-199   Triglycerides 106 mg/dL  0-149   HDL Cholesterol 48 mg/dL  >39   VLDL Cholesterol [place] 21 mg/dL  5-40   LDL Cholesterol Calc 99 mg/dL  0-99   LDL/HDL Ratio 2 1 ratio units  0 0-3 6   LDL/HDL Ratio                                                             Men  Women                                               1/2 Avg  Risk  1 0    1 5                                                   Avg Risk  3 6    3 2                                                2X Avg  Risk  6 2    5 0                                                3X Avg  Risk  8 0    6 1     (LC) PSA Total (Reflex To Free) [id number] 08:57AM Mitzi Men     Test Name Result Flag Reference   Prostate Specific Ag, Serum 0 9 ng/mL  0 0-4 0   Roche ECLIA methodology  According to the American Urological Association, Serum PSA should  decrease and remain at undetectable levels after radical  prostatectomy   The AUA defines biochemical recurrence as an initial  PSA value 0 2 ng/mL or greater followed by a subsequent confirmatory  PSA value 0 2 ng/mL or greater  Values obtained with different assay methods or kits cannot be used  interchangeably  Results cannot be interpreted as absolute evidence  of the presence or absence of malignant disease  Reflex Criteria      The percent free PSA is performed on a reflex basis only when the  total PSA is between 4 0 and 10 0 ng/mL  The percent free PSA is performed on a reflex basis only when the  total PSA is between 4 0 and 10 0 ng/mL       (LC) TSH Rfx on Abnormal to Free T4 [id number] 08:57AM Bertha Guadalupe     Test Name Result Flag Reference   TSH 2 900 uIU/mL  0 450-4 500

## 2018-01-11 NOTE — CONSULTS
Future Appointments    Signatures   Electronically signed by : ENEIDA Kiser ; Nov 24 2017  2:19PM EST                       (Author)

## 2018-01-11 NOTE — RESULT NOTES
Verified Results  [facility]) Thyroid Cascade Profile [id number] 02:02PM Mildred Rubin     Test Name Result Flag Reference   TSH 4 960 uIU/mL H 0 450-4 500   T4,Free (Direct) 1 16 ng/dL  0 82-1 77     (LC) Thyroxine (T4) Free, Direct, S [id number] 02:02PM Mildred Rubin     Test Name Result Flag Reference   Thyroid Peroxidase (TPO) Ab 7 IU/mL  0-34   Interpretive Comment Comment     An elevation of TSH and a normal FT4 in the absence of anti-thyroid  peroxidase antibody are suggestive of Subclinical Hypothyroidism  Similar values have also been associated with Non-Thyroidal Illness in  severely ill patients  Discussion/Summary   PATIENT'S TSH IS SLIGHTLY LOW   INCREASED HIS LEVOTHYROXINE TO 100MCG DAILY   REPEAT BLOOD WORK IN NOVEMBER AS ORDERED   - DR RUBIN      Signatures   Electronically signed by : Chandrakant Atwood MD; Jun 7 2016  3:17PM EST                       (Author)

## 2018-01-11 NOTE — RESULT NOTES
Verified Results  (1) COMPREHENSIVE METABOLIC PANEL [id number] 41:15GF Iver Closs     Test Name Result Flag Reference   Glucose, Serum 94 mg/dL  65-99   BUN 22 mg/dL  6-24   Creatinine, Serum 1 25 mg/dL  0 76-1 27   eGFR If NonAfricn Am 67 mL/min/1 73  >59   eGFR If Africn Am 78 mL/min/1 73  >59   BUN/Creatinine Ratio 18  9-20   Sodium, Serum 142 mmol/L  134-144   **Please note reference interval change**   Potassium, Serum 4 5 mmol/L  3 5-5 2   Chloride, Serum 98 mmol/L     **Please note reference interval change**   Carbon Dioxide, Total 26 mmol/L  18-29   Calcium, Serum 10 1 mg/dL  8 7-10 2   Protein, Total, Serum 7 6 g/dL  6 0-8 5   Albumin, Serum 4 8 g/dL  3 5-5 5   Globulin, Total 2 8 g/dL  1 5-4 5   A/G Ratio 1 7  1 1-2 5   Bilirubin, Total 0 7 mg/dL  0 0-1 2   Alkaline Phosphatase, S 75 IU/L     AST (SGOT) 24 IU/L  0-40   ALT (SGPT) 27 IU/L  0-44     (1) LIPID PANEL FASTING W DIRECT LDL REFLEX [id number] 10:02AM FameCastdonell Closs     Test Name Result Flag Reference   Cholesterol, Total 159 mg/dL  100-199   Triglycerides 129 mg/dL  0-149   HDL Cholesterol 36 mg/dL L >39   LDL Cholesterol Calc 97 mg/dL  0-99     (1) VITAMIN D 25-HYDROXY 20VVO2918 10:02AM Iver Closs     Test Name Result Flag Reference   Vitamin D, 25-Hydroxy 51 7 ng/mL  30 0-100 0   Vitamin D deficiency has been defined by the [place] of  [employer] and an Endocrine Society practice guideline as a  level of serum 25-OH vitamin D less than 20 ng/mL (1,2)  The Endocrine Society went on to further define vitamin D  insufficiency as a level between 21 and 29 ng/mL (2)  1  IOM ([place] [employer])  2010  Dietary reference     intakes for calcium and D  [address] [facility]: The     FTBpro  2  Ricardo MF, Noah NC, Angelia LOPEZ, et al      Evaluation, treatment, and prevention of vitamin D     deficiency: an Endocrine Society clinical practice     guideline  JCEM  2011 Jul; 96(7):1911-30       ([facility]) [facility] TWK60 Default [id number] 10:02AM Corry Wren     Test Name Result Flag Reference   [facility] CMP14 Default Comment     A hand-written panel/profile was received from your office  In  accordance with the [facility] Ambiguous Test Code Policy dated July 0318, we have completed your order by using the closest currently  or formerly recognized AMA panel  We have assigned Comprehensive  Metabolic Panel (14), Test Code #230926 to this request   If this  is not the testing you wished to receive on this specimen, please  contact the [address] [place] [address] Client Inquiry/Technical Services Department  to clarify the test order  We appreciate your business         Discussion/Summary   TRIGLYCERIDES ARE MUCH BETTER!   DR REYNOLDS [facility]

## 2018-01-12 ENCOUNTER — ALLSCRIPTS OFFICE VISIT (OUTPATIENT)
Dept: OTHER | Facility: OTHER | Age: 53
End: 2018-01-12

## 2018-01-13 VITALS
HEIGHT: 70 IN | HEART RATE: 60 BPM | WEIGHT: 202 LBS | SYSTOLIC BLOOD PRESSURE: 106 MMHG | BODY MASS INDEX: 28.92 KG/M2 | DIASTOLIC BLOOD PRESSURE: 70 MMHG

## 2018-01-13 VITALS
SYSTOLIC BLOOD PRESSURE: 106 MMHG | HEART RATE: 68 BPM | BODY MASS INDEX: 28.92 KG/M2 | WEIGHT: 202 LBS | RESPIRATION RATE: 18 BRPM | DIASTOLIC BLOOD PRESSURE: 74 MMHG | HEIGHT: 70 IN

## 2018-01-13 VITALS
BODY MASS INDEX: 28.92 KG/M2 | SYSTOLIC BLOOD PRESSURE: 106 MMHG | HEART RATE: 56 BPM | DIASTOLIC BLOOD PRESSURE: 71 MMHG | HEIGHT: 70 IN | WEIGHT: 202 LBS

## 2018-01-13 NOTE — PROGRESS NOTES
Assessment   1  Traumatic rupture of right distal biceps tendon, subsequent encounter (V58 89,840 8)     (Y63 442P)    Discussion/Summary      Continue home exercises for strengthening  gradual improvement of forearm numbness and pain for up to 1 year, with potential for permanent symptoms at that point  in 2 months  Chief Complaint   1  Arm Pain    History of Present Illness   HPI: Follow-up right elbow after distal biceps tendon repair 8/31/17  Patient reports persistent mild paresthesias over the lateral forearm which is decreasing in size  He has occasional sharp pain in this region  He also reports sharp pain in the distal biceps region with terminal supination  He recently completed physical therapy  Active Problems   1  Benign essential hypertension (401 1) (I10)   2  Colon cancer screening (V76 51) (Z12 11)   3  Essential hypertriglyceridemia (272 1) (E78 1)   4  Family history of colon cancer (V16 0) (Z80 0)   5  History of allergy (V15 09) (Z88 9)   6  Hypercholesterolemia (272 0) (E78 00)   7  Hypothyroidism (244 9) (E03 9)   8  Need for DTP vaccine (V06 1) (Z23)   9  Need for prophylactic vaccination and inoculation against influenza (V04 81) (Z23)   10  Need for revaccination (V05 9) (Z23)   11  Prostate cancer screening (V76 44) (Z12 5)   12  Psoriasis (696 1) (L40 9)   13  Traumatic rupture of right distal biceps tendon, subsequent encounter (V58 89,840 8)      (S46 211D)   14  Vitamin D deficiency (268 9) (E55 9)   15   Well adult on routine health check (V70 0) (Z00 00)    Past Medical History    · History of Abdominal pain, RUQ (789 01) (R10 11)   · History of Abnormal thyroid screen (blood) (794 5) (R94 6)   · Acute bronchitis (466 0) (J20 9)   · History of Acute frontal sinusitis, recurrence not specified (461 1) (J01 10)   · Acute pharyngitis (462) (J02 9)   · Acute sinusitis (461 9) (J01 90)   · Acute upper respiratory infection (465 9) (J06 9)   · History of Blood pressure elevated (401  9) (I10)   · History of Fracture Of The Lower Jaw (Closed) (802 20)   · History of chronic sinusitis (V12 69) (Z87 09)   · History of fever (V13 89) (Z87 898)   · History of upper respiratory infection (V12 09) (Z87 09)   · History of Myalgia and myositis (729 1)   · History of Sprained Ribs (848 3)   · History of Tendonitis of elbow, right (727 09) (M77 8)    Surgical History    · History of Jaw Surgery   · History of Oral Surgery Tooth Extraction   · History of Sinus Surgery   · History of Tonsillectomy With Adenoidectomy    Family History    · Family history of Diabetes Mellitus (V18 0)   · Family history of Hypertension (V17 49)   · Family history of Acute Myocardial Infarction (V17 3)   · Family history of Hypertension (V17 49)   · Family history of Stroke Syndrome (V17 1)    Social History    · Denied: History of Alcohol Use (History)   · Denied: History of Drug Use   · Never A Smoker    Current Meds    1  Atorvastatin Calcium 20 MG Oral Tablet (Lipitor); Take 1 tablet by mouth at bedtime; Therapy: 55RNN7769 to (Evaluate:02Jan2018)  Requested for: 47YJU1361; Last     Rx:93Dle0281 Ordered   2  Coenzyme Q10 100 MG Oral Capsule; Therapy: (Recorded:72Fzm6374) to Recorded   3  Daily Multivitamin TABS; Therapy: (Recorded:69Vdo3577) to Recorded   4  Iron TABS; Therapy: (Recorded:21Wds5922) to Recorded   5  Levocetirizine Dihydrochloride 5 MG Oral Tablet (Xyzal); TAKE 1 TABLET DAILY IN THE     EVENING; Therapy: 04EZK6270 to (02) 8300 1414)  Requested for: 68PST8727; Last     Rx:02Oct2017 Ordered   6  Levothyroxine Sodium 112 MCG Oral Tablet; take 1 tablet by mouth every day; Therapy: 90QVD1032 to (Evaluate:31Mar2018)  Requested for: 02Oct2017; Last     Rx:02Oct2017 Ordered   7  Lisinopril 10 MG Oral Tablet; TAKE 1 TABLET DAILY; Therapy: 62AKP4095 to (Evaluate:11May2018)  Requested for: 29SHH8274; Last     Rx:16May2017 Ordered   8   [place]-3 Fish Oil 1200 MG Oral Capsule; TAKE 1 CAPSULE Daily; Therapy: 87MEI4867 to (Evaluate:57Eca7126) Recorded   9  Ventolin  (90 Base) MCG/ACT Inhalation Aerosol Solution; INHALE 2 PUFFS     EVERY 4-6 HOURS AS NEEDED; Therapy: 21Apr2015 to (Last Rx:21Apr2015)  Requested for: 21Apr2015 Ordered   10  Vitamin D 1000 UNIT CAPS; Therapy: (Recorded:27Oms8409) to Recorded    Allergies   1  No Known Drug Allergies  2  Dust   3  Other    Vitals    Recorded: 12Jan2018 08:09AM   Heart Rate 79   Systolic 799   Diastolic 70   Height 5 ft 9 7 in   Weight 202 lb    BMI Calculated 29 23   BSA Calculated 2 09     Physical Exam   Right elbow:  No gross deformity  Incision is healed without erythema  Decreased sensation to light touch lateral forearm  Full range of motion  Hook test is negative  2+ radial pulse  5/5 strength wrist/finger extension           Signatures    Electronically signed by : ENEIDA Rodriguez ; Jan 12 2018  8:46AM EST                       (Author)

## 2018-01-13 NOTE — CONSULTS
Future Appointments    Signatures   Electronically signed by : ENEIDA Lala ; Oct 13 2017  8:41AM EST                       (Author)

## 2018-01-13 NOTE — RESULT NOTES
Verified Results  (1) TSH WITH FT4 REFLEX 95Wkb2484 12:44PM Celia Walker     Test Name Result Flag Reference   TSH 3 940 uIU/mL  0 450-4 500       Plan  Hypothyroidism    · From  Levothyroxine Sodium 100 MCG Oral Tablet Take 1 tablet daily To  Levothyroxine Sodium 112 MCG Oral Tablet TAKE 1 TABLET DAILY   · (1) TSH WITH FT4 REFLEX; Status:Active;  Requested QLD:14RHW5118;

## 2018-01-14 VITALS
DIASTOLIC BLOOD PRESSURE: 80 MMHG | HEART RATE: 63 BPM | OXYGEN SATURATION: 98 % | BODY MASS INDEX: 31.27 KG/M2 | TEMPERATURE: 94 F | SYSTOLIC BLOOD PRESSURE: 144 MMHG | WEIGHT: 214 LBS

## 2018-01-14 VITALS
DIASTOLIC BLOOD PRESSURE: 72 MMHG | WEIGHT: 200 LBS | BODY MASS INDEX: 28.63 KG/M2 | SYSTOLIC BLOOD PRESSURE: 111 MMHG | HEART RATE: 72 BPM | HEIGHT: 70 IN

## 2018-01-15 VITALS
WEIGHT: 202 LBS | DIASTOLIC BLOOD PRESSURE: 79 MMHG | HEART RATE: 64 BPM | BODY MASS INDEX: 28.92 KG/M2 | HEIGHT: 70 IN | SYSTOLIC BLOOD PRESSURE: 125 MMHG

## 2018-01-15 NOTE — RESULT NOTES
Discussion/Summary   NORMAL THYROID   DR ROSALES     Verified Results  ([address] [facility]) TSH Rfx on Abnormal to Free T4 [id number] 12:52PM Lakshmi Burk     Test Name Result Flag Reference   TSH 2 180 uIU/mL  0 450-4 500

## 2018-01-15 NOTE — RESULT NOTES
Verified Results  US GALLBLADDER [id number] 07:23AM Josi [facility] Order Number: [id number]    - Patient Instructions: To schedule this appointment, please contact Central Scheduling at [phone]  Test Name Result Flag Reference   US GALLBLADDER (Report)     RIGHT UPPER QUADRANT ULTRASOUND     INDICATION: Right upper quadrant pain  COMPARISON: None  TECHNIQUE:  Real-time ultrasound of the right upper quadrant was performed with a curvilinear transducer with both volumetric sweeps and still imaging techniques  FINDINGS:     PANCREAS: Visualized portions of the pancreas are within normal limits  AORTA AND IVC: Visualized portions are normal for patient age  LIVER:   Size: Mildly enlarged  The liver measures 17 8 cm in the midclavicular line  Contour: Surface contour is smooth  Parenchyma: There is increased echogenicity of the liver parenchyma suggestive of fatty infiltration  No evidence of suspicious mass  The main portal vein is patent and hepatopetal       BILIARY:   The gallbladder is contracted  No wall thickening or pericholecystic fluid  No stones or sludge identified  No sonographic Sotelo's sign  No intrahepatic biliary dilatation  CBD measures 2 mm  No choledocholithiasis  KIDNEY:    Right kidney measures 12 1 x 7 3 cm  Within normal limits  ASCITES:  None  IMPRESSION:     Contracted gallbladder with no obvious gallstones or sludge  No pericholecystic fluid  No evidence of acute intra-abdominal abnormality  Enlarged fatty liver         Workstation performed: [id number]     Signed by:   Wilner Hughes MD   8/24/16

## 2018-01-15 NOTE — PROGRESS NOTES
Assessment    1  Hypercholesterolemia (272 0) (E78 0)   2  Benign essential hypertension (401 1) (I10)   3  Hypothyroidism (244 9) (E03 9)   4  Essential hypertriglyceridemia (272 1) (E78 1)    Plan  Benign essential hypertension, PMH: Blood pressure elevated    · Lisinopril 20 MG Oral Tablet; Take 1 tablet daily  History of allergy    · Levocetirizine Dihydrochloride 5 MG Oral Tablet (Xyzal); TAKE 1 TABLET DAILY IN  THE EVENING  Hypercholesterolemia    · Atorvastatin Calcium 20 MG Oral Tablet (Lipitor); TAKE ONE TABLET BY MOUTH AT  BEDTIME   · (1) COMPREHENSIVE METABOLIC PANEL; Status:Active; Requested for:41Jhw6192;    · (1) LIPID PANEL, FASTING; Status:Active; Requested for:96Psg4127;     Discussion/Summary    patient wants to try different statin to see if he will have the symptoms  Omega 3 Fatty acid Over the counter for his high Triglycerides per his request   f/u in 3 months with blood work   call with any side effects   Possible side effects of new medications were reviewed with the patient/guardian today  The treatment plan was reviewed with the patient/guardian  The patient/guardian understands and agrees with the treatment plan   total time of encounter was 30 minutes and 25 minutes was spent counseling  Chief Complaint  PT is here today to f/u from not taking his Crestor due to muscle pain  History of Present Illness  here to follow up blood work  was having issues with muscle aches and pain 3 weeks ago and Crestor was stopped that time  symptoms resolved 2 days after Crestor was stopped  Recent Blood work showed Triglycerides 463      The patient is being seen for a routine clinic follow-up of rhinitis  The patient is currently asymptomatic  No associated symptoms are reported  Current treatment includes nonsedating antihistamines  The patient states his hyperlipidemia has been stable since the last visit  Comorbid Illnesses: hypertension  He has no significant interval events  Symptoms: denies chest pain, denies intermittent leg claudication, improved muscle pain and improved muscle weakness  Associated symptoms include no focal neurologic deficits and no memory loss  Medications: the patient is adherent with his medication regimen  the patient complains of medication side effects  The patient presents for follow-up of essential hypertension  The patient states he has been doing well with his blood pressure control since the last visit  He has no comorbid illnesses  He has no significant interval events  Symptoms: The patient is currently asymptomatic  Associated symptoms include no headache, no focal neurologic deficits and no memory loss  Home monitoring: The patient checks his blood pressure sporadically  Medications: the patient is adherent with his medication regimen  He denies medication side effects  Disease Management: the patient is doing well with his blood pressure goals  Review of Systems    Constitutional: No fever or chills, feels well, no tiredness, no recent weight gain or weight loss  Eyes: No complaints of eye pain, no red eyes, no discharge from eyes, no itchy eyes  ENT: no complaints of earache, no hearing loss, no nosebleeds, no nasal discharge, no sore throat, no hoarseness  Cardiovascular: No complaints of slow heart rate, no fast heart rate, no chest pain, no palpitations, no leg claudication, no lower extremity  Respiratory: No complaints of shortness of breath, no wheezing, no cough, no SOB on exertion, no orthopnea or PND  Gastrointestinal: No complaints of abdominal pain, no constipation, no nausea or vomiting, no diarrhea or bloody stools  Genitourinary: No complaints of dysuria, no incontinence, no hesitancy, no nocturia, no genital lesion, no testicular pain  Musculoskeletal: No complaints of arthralgia, no myalgias, no joint swelling or stiffness, no limb pain or swelling     Integumentary: No complaints of skin rash or skin lesions, no itching, no skin wound, no dry skin  Neurological: No compliants of headache, no confusion, no convulsions, no numbness or tingling, no dizziness or fainting, no limb weakness, no difficulty walking  Psychiatric: Is not suicidal, no sleep disturbances, no anxiety or depression, no change in personality, no emotional problems  Endocrine: No complaints of proptosis, no hot flashes, no muscle weakness, no erectile dysfunction, no deepening of the voice, no feelings of weakness  Hematologic/Lymphatic: No complaints of swollen glands, no swollen glands in the neck, does not bleed easily, no easy bruising  Active Problems    1  Benign essential hypertension (401 1) (I10)   2  History of allergy (V15 09) (Z88 9)   3  Hypercholesterolemia (272 0) (E78 0)   4  Hypothyroidism (244 9) (E03 9)   5  Need for DTP vaccine (V06 1) (Z23)   6  Need for prophylactic vaccination and inoculation against influenza (V04 81) (Z23)   7  Psoriasis (696 1) (L40 9)   8  Tendonitis of elbow, right (727 09) (M77 8)   9  Vitamin D deficiency (268 9) (E55 9)    Past Medical History    1  History of Abnormal thyroid screen (blood) (794 5) (R94 6)   2  Acute bronchitis (466 0) (J20 9)   3  History of Acute frontal sinusitis, recurrence not specified (461 1) (J01 10)   4  Acute pharyngitis (462) (J02 9)   5  Acute sinusitis (461 9) (J01 90)   6  Acute upper respiratory infection (465 9) (J06 9)   7  History of Blood pressure elevated (796 2) (I10)   8  History of Fracture Of The Lower Jaw (Closed) (802 20)   9  History of chronic sinusitis (V12 69) (Z87 09)   10  History of fever (V13 89) (Z87 898)   11  History of upper respiratory infection (V12 09) (Z87 09)   12  History of Myalgia and myositis (729 1) (M79 1,M60 9)   13  History of Sprained Ribs (848 3)    The active problems and past medical history were reviewed and updated today  Surgical History    1  History of Jaw Surgery   2   History of Oral Surgery Tooth Extraction   3  History of Sinus Surgery   4  History of Tonsillectomy With Adenoidectomy    The surgical history was reviewed and updated today  Family History    1  Family history of Diabetes Mellitus (V18 0)   2  Family history of Hypertension (V17 49)    3  Family history of Acute Myocardial Infarction (V17 3)    4  Family history of Hypertension (V17 49)    5  Family history of Stroke Syndrome (V17 1)    The family history was reviewed and updated today  Social History    · Denied: History of Alcohol Use (History)   · Denied: History of Drug Use   · Never A Smoker  The social history was reviewed and updated today  The social history was reviewed and is unchanged  Current Meds   1  Coenzyme Q10 100 MG Oral Capsule; Therapy: (Recorded:85Iyi0644) to Recorded   2  Daily Multivitamin TABS; Therapy: (Recorded:02Zee5681) to Recorded   3  Iron TABS; Therapy: (Recorded:78Xoe1779) to Recorded   4  Levocetirizine Dihydrochloride 5 MG Oral Tablet; TAKE 1 TABLET DAILY IN THE EVENING; Therapy: 04AOP1360 to (Complete:20Apr2016)  Requested for: 99UCU5461; Last   Rx:23Oct2015 Ordered   5  Levothyroxine Sodium 88 MCG Oral Tablet; TAKE 1 TABLET DAILY; Therapy: 76FME2103 to (Evaluate:20Apr2016)  Requested for: 64CGS0505; Last   Rx:23Oct2015 Ordered   6  Lisinopril 20 MG Oral Tablet; take 1 tablet every day; Therapy: 36FMR2343 to (Evaluate:93Rpw1686)  Requested for: 71Aeg2096; Last   Rx:11Dqa8015 Ordered   7  Omega-3 Fish Oil 1200 MG Oral Capsule; TAKE 1 CAPSULE Daily; Therapy: 39WEJ3316 to (Evaluate:74Xto6966) Recorded   8  Ventolin  (90 Base) MCG/ACT Inhalation Aerosol Solution; INHALE 2 PUFFS   EVERY 4-6 HOURS AS NEEDED; Therapy: 97YDT6924 to (Last Rx:27Mvv3431)  Requested for: 37UFJ9411 Ordered   9  Ventolin  (90 Base) MCG/ACT Inhalation Aerosol Solution; INHALE 2 PUFFS   EVERY 4-6 HOURS AS NEEDED; Therapy: 90Mxv2095 to (Last Rx:21Apr2015)  Requested for: 54Rcl2012 Ordered   10  Vitamin D 1000 UNIT CAPS; Therapy: (Recorded:17Kkk0037) to Recorded    The medication list was reviewed and updated today  Allergies    1  No Known Drug Allergies    2  Dust   3  Other    Vitals  Vital Signs [Data Includes: Current Encounter]    Recorded: [id number] 02:44PM   Heart Rate 80   Respiration 16   Systolic 050   Diastolic 84   Height 5 ft 9 37 in   Weight 220 lb 2 oz   BMI Calculated 32 16   BSA Calculated 2 16     Physical Exam    Constitutional   General appearance: No acute distress, well appearing and well nourished  Eyes   Conjunctiva and lids: No swelling, erythema, or discharge  Pupils and irises: Equal, round and reactive to light  Ears, Nose, Mouth, and Throat   External inspection of ears and nose: Normal     Otoscopic examination: Tympanic membrance translucent with normal light reflex  Canals patent without erythema  Nasal mucosa, septum, and turbinates: Normal without edema or erythema  Oropharynx: Normal with no erythema, edema, exudate or lesions  Pulmonary   Respiratory effort: No increased work of breathing or signs of respiratory distress  Auscultation of lungs: Clear to auscultation, equal breath sounds bilaterally, no wheezes, no rales, no rhonci  Cardiovascular   Palpation of heart: Normal PMI, no thrills  Examination of extremities for edema and/or varicosities: Normal     Lymphatic   Palpation of lymph nodes in neck: No lymphadenopathy  Musculoskeletal   Gait and station: Normal     Digits and nails: Normal without clubbing or cyanosis  Inspection/palpation of joints, bones, and muscles: Normal     Skin   Skin and subcutaneous tissue: Normal without rashes or lesions           Results/Data  Results   (1) CBC/PLT/DIFF 68OYX3718 12:46PM [facility]     Test Name Result Flag Reference   WHITE BLOOD CELL COUNT 7 1 Thousand/uL  3 8-10 8   RED BLOOD CELL COUNT 5 09 Million/uL  4 20-5 80   HEMOGLOBIN 15 1 g/dL  13 2-17 1   HEMATOCRIT 45 3 % 38 5-50 0   MCV 88 9 fL  80 0-100 0   MCH 29 6 pg  27 0-33 0   MCHC 33 3 g/dL  32 0-36 0   RDW 13 2 %  11 0-15 0   PLATELET COUNT 627 Thousand/uL  140-400   MPV 8 9 fL  7 5-11 5   ABSOLUTE NEUTROPHILS 5169 cells/uL  2396-4199   ABSOLUTE LYMPHOCYTES 1101 cells/uL  850-3900   ABSOLUTE MONOCYTES 696 cells/uL  200-950   ABSOLUTE EOSINOPHILS 99 cells/uL     ABSOLUTE BASOPHILS 36 cells/uL  0-200   NEUTROPHILS 72 8 %     LYMPHOCYTES 15 5 %     MONOCYTES 9 8 %     EOSINOPHILS 1 4 %     BASOPHILS 0 5 %       (1) COMPREHENSIVE METABOLIC PANEL 56MOQ3058 23:89ER [facility]     Test Name Result Flag Reference   GLUCOSE 96 mg/dL  65-99   Fasting reference interval   UREA NITROGEN (BUN) 21 mg/dL  7-25   CREATININE 1 18 mg/dL  0 60-1 35   eGFR NON-AFR  AMERICAN 73 mL/min/1 73m2  > OR = 60   eGFR AFRICAN AMERICAN 84 mL/min/1 73m2  > OR = 60   BUN/CREATININE RATIO   4-65   NOT APPLICABLE (calc)   SODIUM 138 mmol/L  135-146   POTASSIUM 4 3 mmol/L  3 5-5 3   CHLORIDE 101 mmol/L     CARBON DIOXIDE 27 mmol/L  19-30   CALCIUM 10 2 mg/dL  8 6-10 3   PROTEIN, TOTAL 7 6 g/dL  6 1-8 1   ALBUMIN 4 9 g/dL  3 6-5 1   GLOBULIN 2 7 g/dL (calc)  1 9-3 7   ALBUMIN/GLOBULIN RATIO 1 8 (calc)  1 0-2 5   BILIRUBIN, TOTAL 0 6 mg/dL  0 2-1 2   ALKALINE PHOSPHATASE 69 U/L     AST 32 U/L  10-40   ALT 46 U/L  9-46     (1) LIPID PANEL, FASTING 24FWD9657 12:46PM [facility]     Test Name Result Flag Reference   CHOLESTEROL, TOTAL 156 mg/dL  125-200   HDL CHOLESTEROL 30 mg/dL L > OR = 40   TRIGLICERIDES 260 mg/dL H <150   LDL-CHOLESTEROL mg/dL (calc)  <130   LDL cholesterol not calculated  Triglyceride levels  greater than 400 mg/dL invalidate calculated LDL results  Desirable range <100 mg/dL for patients with CHD or  diabetes and <70 mg/dL for diabetic patients with  known heart disease     CHOL/HDLC RATIO 5 2 (calc) H < OR = 5 0   NON HDL CHOLESTEROL 126 mg/dL (calc)     Target for non-HDL cholesterol is 30 mg/dL higher than   LDL cholesterol target  See Below     We received your handwritten test order and  performed the AMA defined lipid panel  If  this is not what you intended to order, please  contact your local   immediately so that we may adjust our billing  appropriately  You may also inquire about  alternative or additional testing      (1) T4, FREE [id number] 12:46PM [address], [address]     Test Name Result Flag Reference   T4, FREE 1 0 ng/dL  0 8-1 8     (1) FREE T3 85WDD0611 12:46PM [facility]   REPORT COMMENT:  FASTING:YES     Test Name Result Flag Reference   T3, FREE 2 8 pg/mL  2 3-4 2     (1) VITAMIN B12 10FIW0833 12:46PM [address], [address]     Test Name Result Flag Reference   VITAMIN B12 513 pg/mL  200-1100     (Q) TSH, 3RD GENERATION 47FND3387 12:46PM [address], [address]     Test Name Result Flag Reference   TSH 2 69 mIU/L  0 40-4 50     *(Q) VITAMIN D, 25-HYDROXY, LC/MS/MS 92IJH8191 12:46PM [facility]     Test Name Result Flag Reference   VITAMIN D, 25-OH, TOTAL 50 ng/mL     Vitamin D Status         25-OH Vitamin D:     Deficiency:                    <20 ng/mL  Insufficiency:             20 - 29 ng/mL  Optimal:                 > or = 30 ng/mL     For 25-OH Vitamin D testing on patients on   D2-supplementation and patients for whom quantitation   of D2 and D3 fractions is required, the QuestAssureD(TM)  25-OH VIT D, (D2,D3), LC/MS/MS is recommended: order   code 98762 (patients >2yrs)  For more information on this test, go to:  http://education  [employer]/faq/PGG500  (This link is being provided for   informational/educational purposes only )     Future Appointments    Date/Time Provider Specialty Site   04/22/2016 08:00 AM Ricarda Recinos DO Family Medicine [address] [facility]   04/25/2016 08:30 AM Ricarda Recinos DO Family Medicine [address]   Electronically signed by : Nadira Lerner MD; Jan 18 2016  3:46PM EST (Author)

## 2018-01-15 NOTE — RESULT NOTES
Verified Results  (LC) TSH Rfx on Abnormal to Free T4 44Hjt4046 12:36PM [facility]     Test Name Result Flag Reference   TSH 2 460 uIU/mL  0 450-4 500     (1) T3 TOTAL 39WDE9124 12:36PM [facility]     Test Name Result Flag Reference   Triiodothyronine (T3) 103 ng/dL         Discussion/Summary   GAL Pina

## 2018-01-16 NOTE — CONSULTS
Future Appointments    Signatures   Electronically signed by : ENEIDA Forrester ; Sep 12 2017  5:45PM EST                       (Author)

## 2018-01-16 NOTE — PROGRESS NOTES
History of Present Illness  [employer] Revaccination:   Revaccination   Vaccine Information: Vaccine(s) Given (names): Tdap (Tally Brine) [id number]  Unable to reach by phone  Spoke with regarding vaccine out of temperature range  Pt called (attempt 1): [id number] 14:04 JLF  Pt called (attempt 2): [id number] 14:45 JLF  Other Information: Revac Tdap (Adacel)  Revaccination Completed: [id number]  Active Problems    1  Benign essential hypertension (401 1) (I10)   2  Colon cancer screening (V76 51) (Z12 11)   3  Essential hypertriglyceridemia (272 1) (E78 1)   4  Family history of colon cancer (V16 0) (Z80 0)   5  History of allergy (V15 09) (Z88 9)   6  Hypercholesterolemia (272 0) (E78 00)   7  Hypothyroidism (244 9) (E03 9)   8  Need for DTP vaccine (V06 1) (Z23)   9  Need for prophylactic vaccination and inoculation against influenza (V04 81) (Z23)   10  Need for revaccination (V05 9) (Z23)   11  Prostate cancer screening (V76 44) (Z12 5)   12  Psoriasis (696 1) (L40 9)   13  Vitamin D deficiency (268 9) (E55 9)   14  Well adult on routine health check (V70 0) (Z00 00)    Current Meds   1  Atorvastatin Calcium 20 MG Oral Tablet; Take 1 tablet by mouth at bedtime; Therapy: 30QVI8922 to (Evaluate:48Kwm7516)  Requested for: 79DWK6858; Last   Rx:18Jan2017 Ordered   2  Benzonatate 100 MG Oral Capsule; TAKE 1 CAPSULE 2-3 TIMES DAILY AS DIRECTED; Therapy: 67WYQ7069 to (Complete:10Qqk9775)  Requested for: 62SFO4822; Last   Rx:17Mar2017 Ordered   3  Coenzyme Q10 100 MG Oral Capsule; Therapy: (Recorded:37Nxs3389) to Recorded   4  Daily Multivitamin TABS; Therapy: (Recorded:30Nyd9242) to Recorded   5  Iron TABS; Therapy: (Recorded:58Dua4434) to Recorded   6  Levocetirizine Dihydrochloride 5 MG Oral Tablet; TAKE 1 TABLET DAILY IN THE   EVENING; Therapy: 41WWL7203 to (Evaluate:10Oct2017)  Requested for: 13Apr2017; Last   Rx:13Apr2017 Ordered   7   Levothyroxine Sodium 112 MCG Oral Tablet; TAKE 1 TABLET DAILY; Therapy: 40VTS2930 to (21 )  Requested for: 74Ngs4259; Last   Rx:94Ckx3487 Ordered   8  Lisinopril 10 MG Oral Tablet; TAKE 1 TABLET DAILY; Therapy: 94CWW2701 to (Evaluate:45Bix8816)  Requested for: 55SUX9526; Last   Rx:91Aqe5124 Ordered   9  [place]-3 Fish Oil 1200 MG Oral Capsule; TAKE 1 CAPSULE Daily; Therapy: 43HKK8472 to (Evaluate:61Ezj0444) Recorded   10  Ventolin  (90 Base) MCG/ACT Inhalation Aerosol Solution; INHALE 2 PUFFS    EVERY 4-6 HOURS AS NEEDED; Therapy: 22Yhc5192 to (Last Rx:26Baj0061)  Requested for: 10Jre4830 Ordered   11  Vitamin D 1000 UNIT CAPS; Therapy: (Recorded:69Rsv0886) to Recorded    Allergies    1  No Known Drug Allergies    2  Dust   3   Other    Plan  Need for revaccination    · RVAC-Adacel 5-2-15 5 LF-MCG/0 5 Intramuscular Suspension    Future Appointments    Date/Time Provider Specialty Site   11/27/2017 08:00 AM Shaniqua Rizzo DO Family Medicine [facility] [address]   Electronically signed by : Crystal Acuna DO; May 16 2017 11:22AM EST                       (Author)

## 2018-01-17 NOTE — PROGRESS NOTES
Assessment    1  Well adult on routine health check (V70 0) (Z00 00)   2  Need for prophylactic vaccination and inoculation against influenza (V04 81) (Z23)   3  Colon cancer screening (V76 51) (Z12 11)    Plan  Benign essential hypertension, Essential hypertriglyceridemia, Hypercholesterolemia,  Vitamin D deficiency    · ([facility]) Wilfredo Ca CMP14 Default; Status:Active; Requested for:25Nov2016;    · ([facility] Lipid Panel; Status:Active; Requested for:25Nov2016;    · ([facility]) TSH+Free T4; Status:Active; Requested for:25Nov2016;    · ([facility]) Vitamin D, 25-Hydroxy, Total; Status:Active; Requested for:25Nov2016;   Colon cancer screening    · COLONOSCOPY; Status:Active; Requested for:25Nov2016;    · 1 - Antelmo Angel MD (Gastroenterology) Physician Referral  Consult  Status: Active   Requested for: 39BAE2072  Care Summary provided  : Yes  Need for prophylactic vaccination and inoculation against influenza    · Fluzone Quadrivalent 0 5 ML Intramuscular Suspension Prefilled Syringe  Well adult on routine health check    · Follow-up visit in 1 year Evaluation and Treatment  Follow-up  Status: Hold For -  Scheduling  Requested for: 20BIY2831   · Begin a limited exercise program ; Status:Complete;   Done: 28HXX1104   · Brush your teeth 3 times a day and floss at least once a day ; Status:Complete;   Done:  21QJQ1359   · Eat a low fat and low cholesterol diet ; Status:Complete;   Done: 57DGH9968   · Some eating tips that can help you lose weight ; Status:Complete;   Done: 73MGL6058   · Use a sun block product with an SPF of 15 or more ; Status:Complete;   Done:  28RSP1493   · We encourage all of our patients to exercise regularly  30 minutes of exercise or physical  activity five or more days a week is recommended for children and adults ;  Status:Complete;   Done: 88XSA5213   · We encourage you to begin to make lifestyle changes to help control your blood  pressure    These may include losing weight, increasing your activity level, limiting salt in  your diet, decreasing alcohol intake, and eating a diet low in fat and rich in fruits  and vegetables ; Status:Complete;   Done: 87PSF3173   · We recommend routine visits to a dentist ; Status:Complete;   Done: 07KOS4545   · We recommend you modify your diet to achieve and maintain a healthy weight  Being  underweight may increase your risk of developing health problems from vitamin and  mineral deficiencies  We recommend a balanced diet rich in fruits and vegetables  You  may also consider increasing your calorie intake by eating more frequently or adding  nuts, avocados, and low-fat cheese or milk to your meals  Please let us know  if you would like to learn more about your nutrition and calorie needs, and additional  options to help you achieve your weight goals ; Status:Complete;   Done: 32BYM7691   · Call [phone] if: You have any warning signs of skin cancer ; Status:Complete;    Done: 27ITP5897   · Call [phone] if: You experience a new kind of chest pain (angina) or pressure ;  Status:Complete;   Done: 97NLY6063    Discussion/Summary  Impression: health maintenance visit  Currently, he eats a healthy diet and has an adequate exercise regimen  Prostate cancer screening: prostate cancer screening is current  Colorectal cancer screening: colonoscopy has been ordered  The risks and benefits of immunizations were discussed  He was advised to be evaluated by an ophthalmologist  Advice and education were given regarding aerobic exercise  Patient discussion: discussed with the patient  Chief Complaint  Patient here for Annual Wellness exam      History of Present Illness  HM, Adult Male: The patient is being seen for a health maintenance evaluation  General Health: The patient's health since the last visit is described as good  He has regular dental visits  He denies vision problems  He denies hearing loss  Immunizations status: not up to date  Lifestyle:   He consumes a diverse and healthy diet  He does not have any weight concerns  He does not exercise regularly  He does not use tobacco  He denies alcohol use  He denies drug use  Screening: cancer screening reviewed and updated  Prostate cancer screening includes no previous prostate-specific antigen testing  Testicular cancer screening includes monthly self testicular examinations  Colorectal cancer screening includes a colonoscopy within the past ten years  metabolic screening reviewed and updated  Metabolic screening includes lipid profile performed within the past five years, glucose screening performed last year and thyroid function test performed last year  HPI: HERE FOR WELLNESS      Review of Systems    Constitutional: No fever or chills, feels well, no tiredness, no recent weight gain or weight loss  Eyes: No complaints of eye pain, no red eyes, no discharge from eyes, no itchy eyes  ENT: no complaints of earache, no hearing loss, no nosebleeds, no nasal discharge, no sore throat, no hoarseness  Cardiovascular: No complaints of slow heart rate, no fast heart rate, no chest pain, no palpitations, no leg claudication, no lower extremity  Respiratory: No complaints of shortness of breath, no wheezing, no cough, no SOB on exertion, no orthopnea or PND  Gastrointestinal: No complaints of abdominal pain, no constipation, no nausea or vomiting, no diarrhea or bloody stools  Genitourinary: No complaints of dysuria, no incontinence, no hesitancy, no nocturia, no genital lesion, no testicular pain  Musculoskeletal: No complaints of arthralgia, no myalgias, no joint swelling or stiffness, no limb pain or swelling  Integumentary: No complaints of skin rash or skin lesions, no itching, no skin wound, no dry skin  Neurological: No compliants of headache, no confusion, no convulsions, no numbness or tingling, no dizziness or fainting, no limb weakness, no difficulty walking     Psychiatric: Is not suicidal, no sleep disturbances, no anxiety or depression, no change in personality, no emotional problems  Endocrine: No complaints of proptosis, no hot flashes, no muscle weakness, no erectile dysfunction, no deepening of the voice, no feelings of weakness  Hematologic/Lymphatic: No complaints of swollen glands, no swollen glands in the neck, does not bleed easily, no easy bruising  Active Problems    1  Benign essential hypertension (401 1) (I10)   2  Essential hypertriglyceridemia (272 1) (E78 1)   3  History of allergy (V15 09) (Z88 9)   4  Hypercholesterolemia (272 0) (E78 00)   5  Hypothyroidism (244 9) (E03 9)   6  Need for DTP vaccine (V06 1) (Z23)   7  Need for prophylactic vaccination and inoculation against influenza (V04 81) (Z23)   8  Psoriasis (696 1) (L40 9)   9   Vitamin D deficiency (268 9) (E55 9)    Past Medical History    · History of Abdominal pain, RUQ (789 01) (R10 11)   · History of Abnormal thyroid screen (blood) (794 5) (R94 6)   · Acute bronchitis (466 0) (J20 9)   · History of Acute frontal sinusitis, recurrence not specified (461 1) (J01 10)   · Acute pharyngitis (462) (J02 9)   · Acute sinusitis (461 9) (J01 90)   · Acute upper respiratory infection (465 9) (J06 9)   · History of Blood pressure elevated (401 9) (I10)   · History of Fracture Of The Lower Jaw (Closed) (802 20)   · History of chronic sinusitis (V12 69) (Z87 09)   · History of fever (V13 89) (B40 329)   · History of upper respiratory infection (V12 09) (Z87 09)   · History of Myalgia and myositis (729 1)   · History of Sprained Ribs (848 3)   · History of Tendonitis of elbow, right (727 09) (M77 8)    Surgical History    · History of Jaw Surgery   · History of Oral Surgery Tooth Extraction   · History of Sinus Surgery   · History of Tonsillectomy With Adenoidectomy    Family History  Mother    · Family history of Diabetes Mellitus (V18 0)   · Family history of Hypertension (V17 49)  Father    · Family history of Acute Myocardial Infarction (V17 3)  Brother    · Family history of Hypertension (V17 49)  Paternal Grandfather    · Family history of Stroke Syndrome (V17 1)    Social History    · Denied: History of Alcohol Use (History)   · Denied: History of Drug Use   · Never A Smoker    Current Meds   1  Atorvastatin Calcium 20 MG Oral Tablet; Take 1 tablet by mouth at bedtime; Therapy: 80URD6309 to (Evaluate:18Jan2017)  Requested for: 56Ggv4951; Last   Rx:52Frr4112 Ordered   2  Coenzyme Q10 100 MG Oral Capsule; Therapy: (Recorded:70Ddr1542) to Recorded   3  Daily Multivitamin TABS; Therapy: (Recorded:23Hom6769) to Recorded   4  Iron TABS; Therapy: (Recorded:67Uqo5301) to Recorded   5  Levocetirizine Dihydrochloride 5 MG Oral Tablet; TAKE 1 TABLET DAILY IN THE   EVENING; Therapy: 17UMR6694 to (Complete:15Apr2017)  Requested for: 81HFI7564; Last   Rx:17Oct2016 Ordered   6  Levothyroxine Sodium 112 MCG Oral Tablet; TAKE 1 TABLET DAILY; Therapy: 03ZKZ8223 to (Evaluate:15Apr2017)  Requested for: 13VGT0655; Last   Rx:17Oct2016 Ordered   7  Lisinopril 20 MG Oral Tablet; Take 1 tablet daily; Therapy: 32HLS3468 to (Evaluate:12Jan2017)  Requested for: 87QHN8822; Last   Rx:18Jan2016 Ordered   8  Omega-3 Fish Oil 1200 MG Oral Capsule; TAKE 1 CAPSULE Daily; Therapy: 70MMN2408 to (Evaluate:16Jul2016) Recorded   9  Ventolin  (90 Base) MCG/ACT Inhalation Aerosol Solution; INHALE 2 PUFFS   EVERY 4-6 HOURS AS NEEDED; Therapy: 21Apr2015 to (Last Rx:21Apr2015)  Requested for: 21Apr2015 Ordered   10  Vitamin D 1000 UNIT CAPS; Therapy: (Recorded:14Voe5889) to Recorded    Allergies    1  No Known Drug Allergies    2  Dust   3  Other    Vitals   Recorded: [id number] 08:29AM   Heart Rate 64   Respiration 18   Systolic 383   Diastolic 82   Height 5 ft 9 65 in   Weight 221 lb 4 oz   BMI Calculated 32 07   BSA Calculated 2 17     Physical Exam    Constitutional   General appearance: No acute distress, well appearing and well nourished      Head and Face   Head and face: Normal     Eyes   Conjunctiva and lids: No erythema, swelling or discharge  Pupils and irises: Equal, round, reactive to light  Ears, Nose, Mouth, and Throat   External inspection of ears and nose: Normal     Otoscopic examination: Tympanic membranes translucent with normal light reflex  Canals patent without erythema  Hearing: Normal     Nasal mucosa, septum, and turbinates: Normal without edema or erythema  Lips, teeth, and gums: Normal, good dentition  Oropharynx: Normal with no erythema, edema, exudate or lesions  Neck   Neck: Supple, symmetric, trachea midline, no masses  Thyroid: Normal, no thyromegaly  Pulmonary   Respiratory effort: No increased work of breathing or signs of respiratory distress  Auscultation of lungs: Clear to auscultation  Cardiovascular   Auscultation of heart: Normal rate and rhythm, normal S1 and S2, no murmurs  Examination of extremities for edema and/or varicosities: Normal     Abdomen   Abdomen: Non-tender, no masses  Liver and spleen: No hepatomegaly or splenomegaly  Lymphatic   Palpation of lymph nodes in neck: No lymphadenopathy  Palpation of lymph nodes in axillae: No lymphadenopathy  Musculoskeletal   Gait and station: Normal     Inspection/palpation of digits and nails: Normal without clubbing or cyanosis  Inspection/palpation of joints, bones, and muscles: Normal     Range of motion: Normal     Stability: Normal     Muscle strength/tone: Normal     Skin   Skin and subcutaneous tissue: Normal without rashes or lesions  Palpation of skin and subcutaneous tissue: Normal turgor  Neurologic   Cranial nerves: Cranial nerves 2-12 intact  Cortical function: Normal mental status  Reflexes: 2+ and symmetric  Sensation: No sensory loss  Coordination: Normal finger to nose and heel to shin      Psychiatric   Judgment and insight: Normal     Orientation to person, place and time: Normal     Recent and remote memory: Intact      Mood and affect: Normal        Future Appointments    Date/Time Provider Specialty Site   05/26/2017 08:15 AM Taina Dolan DO [facility] [address] [address]   Electronically signed by : Joyce Harris DO; Nov 25 2016  9:04AM EST                       (Author)

## 2018-01-22 VITALS
HEART RATE: 66 BPM | HEIGHT: 70 IN | SYSTOLIC BLOOD PRESSURE: 110 MMHG | BODY MASS INDEX: 28.92 KG/M2 | DIASTOLIC BLOOD PRESSURE: 78 MMHG | WEIGHT: 202 LBS

## 2018-01-23 VITALS
HEIGHT: 70 IN | DIASTOLIC BLOOD PRESSURE: 70 MMHG | SYSTOLIC BLOOD PRESSURE: 111 MMHG | WEIGHT: 202 LBS | BODY MASS INDEX: 28.92 KG/M2 | HEART RATE: 79 BPM

## 2018-02-04 ENCOUNTER — OFFICE VISIT (OUTPATIENT)
Dept: URGENT CARE | Facility: MEDICAL CENTER | Age: 53
End: 2018-02-04
Payer: COMMERCIAL

## 2018-02-04 VITALS
BODY MASS INDEX: 29.63 KG/M2 | SYSTOLIC BLOOD PRESSURE: 118 MMHG | RESPIRATION RATE: 18 BRPM | TEMPERATURE: 98.8 F | HEIGHT: 70 IN | WEIGHT: 207 LBS | OXYGEN SATURATION: 98 % | DIASTOLIC BLOOD PRESSURE: 74 MMHG | HEART RATE: 95 BPM

## 2018-02-04 DIAGNOSIS — J01.01 ACUTE RECURRENT MAXILLARY SINUSITIS: Primary | ICD-10-CM

## 2018-02-04 PROCEDURE — 99213 OFFICE O/P EST LOW 20 MIN: CPT | Performed by: PHYSICIAN ASSISTANT

## 2018-02-04 RX ORDER — FLUTICASONE PROPIONATE 50 MCG
1 SPRAY, SUSPENSION (ML) NASAL DAILY
Qty: 1 BOTTLE | Refills: 0 | Status: SHIPPED | OUTPATIENT
Start: 2018-02-04 | End: 2018-07-05 | Stop reason: SDUPTHER

## 2018-02-04 RX ORDER — AMOXICILLIN AND CLAVULANATE POTASSIUM 875; 125 MG/1; MG/1
1 TABLET, FILM COATED ORAL EVERY 12 HOURS SCHEDULED
Qty: 10 TABLET | Refills: 0 | Status: SHIPPED | OUTPATIENT
Start: 2018-02-04 | End: 2018-02-09

## 2018-02-04 NOTE — PROGRESS NOTES
Assessment/Plan:      Diagnoses and all orders for this visit:    Acute recurrent maxillary sinusitis  -     amoxicillin-clavulanate (AUGMENTIN) 875-125 mg per tablet; Take 1 tablet by mouth every 12 (twelve) hours for 5 days  -     fluticasone (FLONASE) 50 mcg/act nasal spray; 1 spray into each nostril daily        Patient Instructions     Clinical presentation consistent with acute maxillary sinusitis  Begin Augmentin and take as prescribed  Use Flonase for congestion  Saline nasal rinses highly recommended  Follow up with the PCP if her symptoms persist   Go to the ER for any distress  Subjective:     Patient ID: Ene Hastings is a 50 y o  male  This is a 51-year-old male presenting for sinus congestion and pain x1 day  He states that he always has a baseline congestion and he woke up this morning with facial pain over the left maxillary sinus and chunky green nasal discharge  He states that he does have chronic sinus issues and he gets bacterial sinus infection multiple times per year  He denies any fevers  He does have a mild cough from postnasal drip  Review of Systems   Constitutional: Negative  HENT: Positive for congestion, postnasal drip, rhinorrhea, sinus pain, sinus pressure and sore throat  Negative for ear discharge, ear pain and facial swelling  Respiratory: Positive for cough  Negative for shortness of breath  Cardiovascular: Negative  Gastrointestinal: Negative  Musculoskeletal: Negative for myalgias  Neurological: Negative  Objective:  Vitals:    02/04/18 1414   BP: 118/74   Pulse: 95   Resp: 18   Temp: 98 8 °F (37 1 °C)   SpO2: 98%        Physical Exam   Constitutional: He appears well-developed and well-nourished  No distress  HENT:   Head: Normocephalic and atraumatic  Right Ear: Tympanic membrane, external ear and ear canal normal  No drainage or tenderness     Left Ear: Tympanic membrane, external ear and ear canal normal  No drainage or tenderness  Nose: Mucosal edema and rhinorrhea present  Right sinus exhibits maxillary sinus tenderness  Right sinus exhibits no frontal sinus tenderness  Left sinus exhibits no maxillary sinus tenderness and no frontal sinus tenderness  Mouth/Throat: Uvula is midline and oropharynx is clear and moist  No oropharyngeal exudate, posterior oropharyngeal edema or posterior oropharyngeal erythema  Eyes: EOM are normal  Pupils are equal, round, and reactive to light  Neck: Normal range of motion  Neck supple  Cardiovascular: Normal rate, regular rhythm and normal heart sounds  Pulmonary/Chest: Effort normal and breath sounds normal  No respiratory distress  He has no wheezes  He has no rhonchi  He has no rales  Abdominal: There is no CVA tenderness, no tenderness at McBurney's point and negative Sotelo's sign  Lymphadenopathy:     He has no cervical adenopathy  Skin: He is not diaphoretic

## 2018-02-04 NOTE — PATIENT INSTRUCTIONS
Clinical presentation consistent with acute maxillary sinusitis  Begin Augmentin and take as prescribed  Use Flonase for congestion  Saline nasal rinses highly recommended  Follow up with the PCP if her symptoms persist   Go to the ER for any distress

## 2018-03-07 NOTE — PROGRESS NOTES
History of Present Illness    Revaccination   Vaccine Information: Vaccine(s) Given (names): Tdap (Margarita Araujo) [id number]  Unable to reach by phone  Spoke with regarding vaccine out of temperature range  Pt called (attempt 1): [phone] 14:04 JLF  Pt called (attempt 2): [phone] 14:45 JLF  Other Information: Revac Tdap (Adacel)  Revaccination Completed: [id number]  Active Problems    1  Essential hypertriglyceridemia (272 1) (E78 1)   2  History of allergy (V15 09) (Z88 9)   3  Need for DTP vaccine (V06 1) (Z23)   4  Need for prophylactic vaccination and inoculation against influenza (V04 81) (Z23)   5  Need for revaccination (V05 9) (Z23)   6  Psoriasis (696 1) (L40 9)   7  Well adult on routine health check (V70 0) (Z00 00)    Immunizations  Influenza --- Vannessa Ridges: [id number] Siad: 25-Nov-2016   Tdap --- Series1: 23-Oct-2015     Current Meds   1  Atorvastatin Calcium 20 MG Oral Tablet; Take 1 tablet by mouth at bedtime   2  Coenzyme Q10 100 MG Oral Capsule   3  Daily Multivitamin TABS   4  Iron TABS   5  Levocetirizine Dihydrochloride 5 MG Oral Tablet; TAKE 1 TABLET DAILY IN THE EVENING   6  Levothyroxine Sodium 112 MCG Oral Tablet; TAKE 1 TABLET DAILY   7  Lisinopril 20 MG Oral Tablet; Take 1 tablet daily   8  Omega-3 Fish Oil 1200 MG Oral Capsule; TAKE 1 CAPSULE Daily   9  Ventolin  (90 Base) MCG/ACT Inhalation Aerosol Solution; INHALE 2 PUFFS   EVERY 4-6 HOURS AS NEEDED   10  Vitamin D 1000 UNIT CAPS    Allergies    1  No Known Drug Allergies    2  Dust   3  Other    Results/Data  PHQ-2 Adult Depression Screening [id number] 09:21AM User, Ahs     Test Name Result Flag Reference   PHQ-2 Adult Depression Score 0     Over the last two weeks, how often have you been bothered by any of the following problems?   Little interest or pleasure in doing things: Not at all - 0  Feeling down, depressed, or hopeless: Not at all - 0   PHQ-2 Adult Depression Screening Negative         Future Appointments    Date/Time Provider Specialty Site   11/27/2017 08:00 AM Marla Kirby DO [facility] [address] [address]   Electronically signed by : Leatha Ha DO; May 16 2017 11:23AM EST                       (Author)

## 2018-04-11 DIAGNOSIS — E03.9 HYPOTHYROIDISM, UNSPECIFIED TYPE: Primary | ICD-10-CM

## 2018-04-11 RX ORDER — LEVOTHYROXINE SODIUM 112 UG/1
TABLET ORAL
Qty: 30 TABLET | Refills: 5 | Status: SHIPPED | OUTPATIENT
Start: 2018-04-11 | End: 2018-06-04 | Stop reason: SDUPTHER

## 2018-04-29 DIAGNOSIS — J30.1 SEASONAL ALLERGIC RHINITIS DUE TO POLLEN: Primary | ICD-10-CM

## 2018-04-29 RX ORDER — LEVOCETIRIZINE DIHYDROCHLORIDE 5 MG/1
TABLET, FILM COATED ORAL
Qty: 30 TABLET | Refills: 5 | Status: SHIPPED | OUTPATIENT
Start: 2018-04-29 | End: 2018-10-27 | Stop reason: SDUPTHER

## 2018-05-16 ENCOUNTER — TELEPHONE (OUTPATIENT)
Dept: INTERNAL MEDICINE CLINIC | Facility: CLINIC | Age: 53
End: 2018-05-16

## 2018-05-16 NOTE — TELEPHONE ENCOUNTER
Pt's wife called in asking if you can please enter a lab order to check his iron - states one of his specialty doctors advised him to have it checked due to him being fatigued

## 2018-05-22 RX ORDER — FOLIC ACID/MULTIVIT,IRON,MINER 0.4MG-18MG
1 TABLET ORAL DAILY
COMMUNITY
Start: 2016-01-18 | End: 2018-09-27 | Stop reason: SDDI

## 2018-05-22 RX ORDER — UBIDECARENONE 100 MG
CAPSULE ORAL
COMMUNITY

## 2018-05-22 RX ORDER — BIOTIN 1 MG
TABLET ORAL
COMMUNITY

## 2018-05-22 RX ORDER — ALBUTEROL SULFATE 90 UG/1
2 AEROSOL, METERED RESPIRATORY (INHALATION)
COMMUNITY
Start: 2015-04-21 | End: 2019-08-15

## 2018-05-22 RX ORDER — ERGOCALCIFEROL (VITAMIN D2) 10 MCG
TABLET ORAL
COMMUNITY

## 2018-05-23 DIAGNOSIS — I10 ESSENTIAL HYPERTENSION: Primary | ICD-10-CM

## 2018-05-24 RX ORDER — LISINOPRIL 10 MG/1
TABLET ORAL
Qty: 90 TABLET | Refills: 3 | Status: SHIPPED | OUTPATIENT
Start: 2018-05-24 | End: 2018-09-27 | Stop reason: ALTCHOICE

## 2018-05-25 ENCOUNTER — OFFICE VISIT (OUTPATIENT)
Dept: FAMILY MEDICINE CLINIC | Facility: CLINIC | Age: 53
End: 2018-05-25
Payer: COMMERCIAL

## 2018-05-25 VITALS
DIASTOLIC BLOOD PRESSURE: 68 MMHG | OXYGEN SATURATION: 96 % | WEIGHT: 205.2 LBS | HEART RATE: 66 BPM | BODY MASS INDEX: 29.44 KG/M2 | SYSTOLIC BLOOD PRESSURE: 106 MMHG

## 2018-05-25 DIAGNOSIS — E55.9 VITAMIN D DEFICIENCY: ICD-10-CM

## 2018-05-25 DIAGNOSIS — E03.9 HYPOTHYROIDISM, UNSPECIFIED TYPE: ICD-10-CM

## 2018-05-25 DIAGNOSIS — I10 BENIGN ESSENTIAL HYPERTENSION: Primary | ICD-10-CM

## 2018-05-25 DIAGNOSIS — E78.1 ESSENTIAL HYPERTRIGLYCERIDEMIA: ICD-10-CM

## 2018-05-25 PROCEDURE — 99214 OFFICE O/P EST MOD 30 MIN: CPT | Performed by: FAMILY MEDICINE

## 2018-05-25 NOTE — PATIENT INSTRUCTIONS

## 2018-05-25 NOTE — PROGRESS NOTES
Assessment/Plan:    Problem List Items Addressed This Visit     Benign essential hypertension - Primary     Bps very low,  Getting dizzy  Will stop isinopril and recheck bp's at home         Relevant Orders    Comprehensive metabolic panel    Ambulatory referral to Cardiology    Essential hypertriglyceridemia     To check labs         Relevant Orders    Lipid Panel with Direct LDL reflex    Hypothyroidism     Will recheck tsh  Likely fatigue due to that         Relevant Orders    CBC    TSH, 3rd generation with T4 reflex    Vitamin D deficiency     Not taking OTC supplement         Relevant Orders    Vitamin D 25 hydroxy          Patient Instructions     Chronic Hypertension   WHAT YOU NEED TO KNOW:   Hypertension is high blood pressure (BP)  Your BP is the force of your blood moving against the walls of your arteries  Normal BP is less than 120/80  Prehypertension is between 120/80 and 139/89  Hypertension is 140/90 or higher  Hypertension causes your BP to get so high that your heart has to work much harder than normal  This can damage your heart  Chronic hypertension is a long-term condition that you can control with a healthy lifestyle or medicines  A controlled blood pressure helps protect your organs, such as your heart, lungs, brain, and kidneys  DISCHARGE INSTRUCTIONS:   Call 911 for any of the following:   · You have discomfort in your chest that feels like squeezing, pressure, fullness, or pain  · You become confused or have difficulty speaking  · You suddenly feel lightheaded or have trouble breathing  · You have pain or discomfort in your back, neck, jaw, stomach, or arm  Return to the emergency department if:   · You have a severe headache or vision loss  · You have weakness in an arm or leg  Contact your healthcare provider if:   · You feel faint, dizzy, confused, or drowsy      · You have been taking your BP medicine and your BP is still higher than your healthcare provider says it should be  · You have questions or concerns about your condition or care  Medicines: You may need any of the following:  · Medicine  may be used to help lower your BP  You may need more than one type of medicine  Take the medicine exactly as directed  · Diuretics  help decrease extra fluid that collects in your body  This will help lower your BP  You may urinate more often while you take this medicine  · Cholesterol medicine  helps lower your cholesterol level  A low cholesterol level helps prevent heart disease and makes it easier to control your blood pressure  · Take your medicine as directed  Contact your healthcare provider if you think your medicine is not helping or if you have side effects  Tell him or her if you are allergic to any medicine  Keep a list of the medicines, vitamins, and herbs you take  Include the amounts, and when and why you take them  Bring the list or the pill bottles to follow-up visits  Carry your medicine list with you in case of an emergency  Follow up with your healthcare provider as directed: You will need to return to have your blood pressure checked and to have other lab tests done  Write down your questions so you remember to ask them during your visits  Manage chronic hypertension:  Talk with your healthcare provider about these and other ways to manage hypertension:  · Take your BP at home  Sit and rest for 5 minutes before you take your BP  Extend your arm and support it on a flat surface  Your arm should be at the same level as your heart  Follow the directions that came with your BP monitor  If possible, take at least 2 BP readings each time  Take your BP at least twice a day at the same times each day, such as morning and evening  Keep a record of your BP readings and bring it to your follow-up visits  Ask your healthcare provider what your blood pressure should be  · Limit sodium (salt) as directed    Too much sodium can affect your fluid balance  Check labels to find low-sodium or no-salt-added foods  Some low-sodium foods use potassium salts for flavor  Too much potassium can also cause health problems  Your healthcare provider will tell you how much sodium and potassium are safe for you to have in a day  He or she may recommend that you limit sodium to 2,300 mg a day  · Follow the meal plan recommended by your healthcare provider  A dietitian or your provider can give you more information on low-sodium plans or the DASH (Dietary Approaches to Stop Hypertension) eating plan  The DASH plan is low in sodium, unhealthy fats, and total fat  It is high in potassium, calcium, and fiber  · Exercise to maintain a healthy weight  Exercise at least 30 minutes per day, on most days of the week  This will help decrease your blood pressure  Ask about the best exercise plan for you  · Decrease stress  This may help lower your BP  Learn ways to relax, such as deep breathing or listening to music  · Limit alcohol  Women should limit alcohol to 1 drink a day  Men should limit alcohol to 2 drinks a day  A drink of alcohol is 12 ounces of beer, 5 ounces of wine, or 1½ ounces of liquor  · Do not smoke  Nicotine and other chemicals in cigarettes and cigars can increase your BP and also cause lung damage  Ask your healthcare provider for information if you currently smoke and need help to quit  E-cigarettes or smokeless tobacco still contain nicotine  Talk to your healthcare provider before you use these products  © 2017 [address] Information is for End User's use only and may not be sold, redistributed or otherwise used for commercial purposes  All illustrations and images included in CareNotes® are the copyrighted property of [employer]  or Justyn Henry  The above information is an  only  It is not intended as medical advice for individual conditions or treatments   Talk to your doctor, nurse or pharmacist before following any medical regimen to see if it is safe and effective for you  No Follow-up on file  Subjective:      Patient ID: Ofelia Biggs is a 50 y o  male  Chief Complaint   Patient presents with    Hypothyroidism     "Bouts of tiredness" On and off X6MOS - a couple days at a time  Here for follow up  Past 6 months c/o fatigue, last 3 weeks excessively tired  Last week had to pull over to sleep on while drive  Always cold  Not exercise  No SOB, no CP    Hypertension   This is a chronic problem  The current episode started today  The problem is unchanged  The problem is controlled  Pertinent negatives include no anxiety, blurred vision, chest pain, headaches, malaise/fatigue, neck pain, orthopnea, palpitations, peripheral edema, PND, shortness of breath or sweats  There are no associated agents to hypertension  Past treatments include ACE inhibitors  The current treatment provides significant improvement  There are no compliance problems  The following portions of the patient's history were reviewed and updated as appropriate: allergies, current medications, past family history, past medical history, past social history, past surgical history and problem list     Review of Systems   Constitutional: Positive for fatigue  Negative for malaise/fatigue and unexpected weight change  HENT: Negative  Eyes: Negative  Negative for blurred vision  Respiratory: Negative for shortness of breath  Cardiovascular: Negative for chest pain, palpitations, orthopnea and PND  Gastrointestinal: Negative  Endocrine: Positive for cold intolerance  Genitourinary: Negative  Musculoskeletal: Negative for neck pain  Allergic/Immunologic: Negative  Neurological: Negative for headaches  Hematological: Negative  Psychiatric/Behavioral: Negative            Current Outpatient Prescriptions   Medication Sig Dispense Refill    albuterol (VENTOLIN HFA) 90 mcg/act inhaler Inhale 2 puffs      Omega-3 Fatty Acids (OMEGA-3 FISH OIL) 1200 MG CAPS Take 1 capsule by mouth daily      atorvastatin (LIPITOR) 20 mg tablet Take 20 mg by mouth daily at bedtime        Cholecalciferol (VITAMIN D3) 1000 units CAPS Take by mouth      co-enzyme Q-10 100 mg capsule Take by mouth      fluticasone (FLONASE) 50 mcg/act nasal spray 1 spray into each nostril daily 1 Bottle 0    ibuprofen (MOTRIN) 800 mg tablet Take 1 tablet by mouth 3 (three) times a day 21 tablet 0    levocetirizine (XYZAL) 5 MG tablet TAKE 1 TABLET DAILY IN THE EVENING  30 tablet 5    levothyroxine 112 mcg tablet TAKE 1 TABLET BY MOUTH EVERY DAY 30 tablet 5    lisinopril (ZESTRIL) 10 mg tablet TAKE 1 TABLET BY MOUTH EVERY DAY 90 tablet 3    Multiple Vitamin (DAILY VALUE MULTIVITAMIN) TABS Take by mouth      Multiple Vitamins-Minerals (MULTIVITAMIN & MINERAL PO) Take 1 tablet by mouth daily in the early morning         No current facility-administered medications for this visit  Objective:    /68   Pulse 66   Wt 93 1 kg (205 lb 3 2 oz)   SpO2 96%   BMI 29 44 kg/m²        Physical Exam   Constitutional: He appears well-developed and well-nourished  Eyes: Pupils are equal, round, and reactive to light  Neck: Normal range of motion  Neck supple  Cardiovascular: Normal rate, regular rhythm, normal heart sounds and intact distal pulses  Pulmonary/Chest: Effort normal and breath sounds normal    Abdominal: Soft  Bowel sounds are normal    Musculoskeletal: Normal range of motion  Neurological: He is alert  Skin: Skin is warm and dry  Nursing note and vitals reviewed               Sophie Pichardo DO

## 2018-06-03 LAB
25(OH)D3+25(OH)D2 SERPL-MCNC: 28.5 NG/ML (ref 30–100)
ALBUMIN SERPL-MCNC: 4.5 G/DL (ref 3.5–5.5)
ALBUMIN/GLOB SERPL: 2 {RATIO} (ref 1.2–2.2)
ALP SERPL-CCNC: 69 IU/L (ref 39–117)
ALT SERPL-CCNC: 33 IU/L (ref 0–44)
AST SERPL-CCNC: 27 IU/L (ref 0–40)
BILIRUB SERPL-MCNC: 0.3 MG/DL (ref 0–1.2)
BUN SERPL-MCNC: 21 MG/DL (ref 6–24)
BUN/CREAT SERPL: 20 (ref 9–20)
CALCIUM SERPL-MCNC: 10.1 MG/DL (ref 8.7–10.2)
CHLORIDE SERPL-SCNC: 101 MMOL/L (ref 96–106)
CHOLEST SERPL-MCNC: 178 MG/DL (ref 100–199)
CO2 SERPL-SCNC: 23 MMOL/L (ref 18–29)
CREAT SERPL-MCNC: 1.06 MG/DL (ref 0.76–1.27)
ERYTHROCYTE [DISTWIDTH] IN BLOOD BY AUTOMATED COUNT: 13.5 % (ref 12.3–15.4)
GLOBULIN SER-MCNC: 2.2 G/DL (ref 1.5–4.5)
GLUCOSE SERPL-MCNC: 99 MG/DL (ref 65–99)
HCT VFR BLD AUTO: 41 % (ref 37.5–51)
HDLC SERPL-MCNC: 37 MG/DL
HGB BLD-MCNC: 13.7 G/DL (ref 13–17.7)
LDLC SERPL CALC-MCNC: 76 MG/DL (ref 0–99)
LDLC/HDLC SERPL: 2.1 RATIO (ref 0–3.6)
MCH RBC QN AUTO: 30.3 PG (ref 26.6–33)
MCHC RBC AUTO-ENTMCNC: 33.4 G/DL (ref 31.5–35.7)
MCV RBC AUTO: 91 FL (ref 79–97)
PLATELET # BLD AUTO: 205 X10E3/UL (ref 150–379)
POTASSIUM SERPL-SCNC: 4.6 MMOL/L (ref 3.5–5.2)
PROT SERPL-MCNC: 6.7 G/DL (ref 6–8.5)
RBC # BLD AUTO: 4.52 X10E6/UL (ref 4.14–5.8)
SL AMB EGFR AFRICAN AMERICAN: 94 ML/MIN/1.73
SL AMB EGFR NON AFRICAN AMERICAN: 81 ML/MIN/1.73
SL AMB VLDL CHOLESTEROL CALC: 65 MG/DL (ref 5–40)
SODIUM SERPL-SCNC: 144 MMOL/L (ref 134–144)
TRIGL SERPL-MCNC: 324 MG/DL (ref 0–149)
TSH SERPL DL<=0.005 MIU/L-ACNC: 3.74 UIU/ML (ref 0.45–4.5)
WBC # BLD AUTO: 5.9 X10E3/UL (ref 3.4–10.8)

## 2018-06-04 DIAGNOSIS — E03.9 HYPOTHYROIDISM, UNSPECIFIED TYPE: ICD-10-CM

## 2018-06-04 RX ORDER — LEVOTHYROXINE SODIUM 0.12 MG/1
125 TABLET ORAL DAILY
Qty: 30 TABLET | Refills: 5 | Status: SHIPPED | OUTPATIENT
Start: 2018-06-04 | End: 2018-08-10

## 2018-07-05 ENCOUNTER — OFFICE VISIT (OUTPATIENT)
Dept: FAMILY MEDICINE CLINIC | Facility: CLINIC | Age: 53
End: 2018-07-05
Payer: COMMERCIAL

## 2018-07-05 VITALS
WEIGHT: 208 LBS | DIASTOLIC BLOOD PRESSURE: 94 MMHG | SYSTOLIC BLOOD PRESSURE: 128 MMHG | BODY MASS INDEX: 29.84 KG/M2 | HEART RATE: 68 BPM

## 2018-07-05 DIAGNOSIS — J30.1 SEASONAL ALLERGIC RHINITIS DUE TO POLLEN: Chronic | ICD-10-CM

## 2018-07-05 DIAGNOSIS — J01.01 ACUTE RECURRENT MAXILLARY SINUSITIS: Primary | ICD-10-CM

## 2018-07-05 DIAGNOSIS — E78.2 MIXED HYPERLIPIDEMIA: Primary | ICD-10-CM

## 2018-07-05 PROCEDURE — 99213 OFFICE O/P EST LOW 20 MIN: CPT | Performed by: FAMILY MEDICINE

## 2018-07-05 RX ORDER — AMOXICILLIN AND CLAVULANATE POTASSIUM 875; 125 MG/1; MG/1
1 TABLET, FILM COATED ORAL 2 TIMES DAILY WITH MEALS
Qty: 20 TABLET | Refills: 0 | Status: SHIPPED | OUTPATIENT
Start: 2018-07-05 | End: 2018-07-15

## 2018-07-05 RX ORDER — FLUTICASONE PROPIONATE 50 MCG
2 SPRAY, SUSPENSION (ML) NASAL DAILY
Qty: 1 BOTTLE | Refills: 5 | Status: SHIPPED | OUTPATIENT
Start: 2018-07-05 | End: 2022-06-06 | Stop reason: HOSPADM

## 2018-07-05 RX ORDER — ATORVASTATIN CALCIUM 20 MG/1
20 TABLET, FILM COATED ORAL
Qty: 90 TABLET | Refills: 3 | Status: SHIPPED | OUTPATIENT
Start: 2018-07-05 | End: 2018-09-27

## 2018-07-05 NOTE — PROGRESS NOTES
Assessment/Plan:    No problem-specific Assessment & Plan notes found for this encounter  Diagnoses and all orders for this visit:    Acute recurrent maxillary sinusitis  Comments:  Treating with Augmentin, Flonase NS, OTC Cough & Cold Preps PRN (Coricidin HBP, Tylenol Cold & Sinus, etc), rest, and good PO hydration  Orders:  -     amoxicillin-clavulanate (AUGMENTIN) 875-125 mg per tablet; Take 1 tablet by mouth 2 (two) times a day with meals for 10 days  -     fluticasone (FLONASE) 50 mcg/act nasal spray; 2 sprays into each nostril daily    Seasonal allergic rhinitis due to pollen  Comments:  Pt to restart Flonase NS  Orders:  -     amoxicillin-clavulanate (AUGMENTIN) 875-125 mg per tablet; Take 1 tablet by mouth 2 (two) times a day with meals for 10 days  -     fluticasone (FLONASE) 50 mcg/act nasal spray; 2 sprays into each nostril daily          Subjective:      Patient ID: Gareth Bobo is a 50 y o  male  Pt with new sinus issues this week          The following portions of the patient's history were reviewed and updated as appropriate: allergies, current medications, past family history, past social history, past surgical history and problem list     Past Medical History:   Diagnosis Date    Chronic sinusitis     LAST ASSESSED: [id number]    Closed fracture of lower jaw bone (HCC)     Hyperlipidemia     Seasonal allergies     Sleep apnea     Doesn't require cpap post UPP surgery       Current Outpatient Prescriptions:     albuterol (VENTOLIN HFA) 90 mcg/act inhaler, Inhale 2 puffs, Disp: , Rfl:     amoxicillin-clavulanate (AUGMENTIN) 875-125 mg per tablet, Take 1 tablet by mouth 2 (two) times a day with meals for 10 days, Disp: 20 tablet, Rfl: 0    atorvastatin (LIPITOR) 20 mg tablet, Take 1 tablet (20 mg total) by mouth daily at bedtime, Disp: 90 tablet, Rfl: 3    Cholecalciferol (VITAMIN D3) 1000 units CAPS, Take by mouth, Disp: , Rfl:     co-enzyme Q-10 100 mg capsule, Take by mouth, Disp: , Rfl:     fluticasone (FLONASE) 50 mcg/act nasal spray, 2 sprays into each nostril daily, Disp: 1 Bottle, Rfl: 5    ibuprofen (MOTRIN) 800 mg tablet, Take 1 tablet by mouth 3 (three) times a day, Disp: 21 tablet, Rfl: 0    levocetirizine (XYZAL) 5 MG tablet, TAKE 1 TABLET DAILY IN THE EVENING , Disp: 30 tablet, Rfl: 5    levothyroxine 125 mcg tablet, Take 1 tablet (125 mcg total) by mouth daily, Disp: 30 tablet, Rfl: 5    lisinopril (ZESTRIL) 10 mg tablet, TAKE 1 TABLET BY MOUTH EVERY DAY, Disp: 90 tablet, Rfl: 3    Multiple Vitamin (DAILY VALUE MULTIVITAMIN) TABS, Take by mouth, Disp: , Rfl:     Multiple Vitamins-Minerals (MULTIVITAMIN & MINERAL PO), Take 1 tablet by mouth daily in the early morning  , Disp: , Rfl:     Omega-3 Fatty Acids (OMEGA-3 FISH OIL) 1200 MG CAPS, Take 1 capsule by mouth daily, Disp: , Rfl:     Allergies   Allergen Reactions    Dust Mite Extract     Other      Annotation - [id number]: Hayfever         Review of Systems   Constitutional: Negative for activity change and fever  HENT: Positive for congestion, postnasal drip, sinus pressure and sore throat  Respiratory: Positive for shortness of breath  Musculoskeletal: Positive for myalgias  Objective:      /94 (BP Location: Right arm, Patient Position: Sitting, Cuff Size: Standard)   Pulse 68   Wt 94 3 kg (208 lb)   BMI 29 84 kg/m²          Physical Exam   Constitutional: He is oriented to person, place, and time  He appears well-developed and well-nourished  No distress  HENT:   Head: Normocephalic and atraumatic  Right Ear: Hearing, tympanic membrane, external ear and ear canal normal    Left Ear: Hearing, tympanic membrane, external ear and ear canal normal    Nose: Mucosal edema present  Right sinus exhibits maxillary sinus tenderness  Left sinus exhibits maxillary sinus tenderness  Mouth/Throat: Oropharynx is clear and moist  No oropharyngeal exudate     Eyes: Conjunctivae are normal    Neck: Normal range of motion  Neck supple  No thyromegaly present  Cardiovascular: Normal rate, regular rhythm and normal heart sounds  Exam reveals no gallop and no friction rub  No murmur heard  Pulmonary/Chest: Effort normal and breath sounds normal  No respiratory distress  He has no wheezes  He has no rales  Lymphadenopathy:     He has no cervical adenopathy  Neurological: He is alert and oriented to person, place, and time  Skin: He is not diaphoretic  Psychiatric: He has a normal mood and affect  His behavior is normal  Judgment and thought content normal    Nursing note and vitals reviewed

## 2018-07-26 ENCOUNTER — TELEPHONE (OUTPATIENT)
Dept: FAMILY MEDICINE CLINIC | Facility: CLINIC | Age: 53
End: 2018-07-26

## 2018-08-10 DIAGNOSIS — E03.1 CONGENITAL HYPOTHYROIDISM WITHOUT GOITER: Primary | ICD-10-CM

## 2018-08-10 LAB — TSH SERPL DL<=0.005 MIU/L-ACNC: 3.21 UIU/ML (ref 0.45–4.5)

## 2018-08-10 RX ORDER — LEVOTHYROXINE SODIUM 137 UG/1
137 TABLET ORAL DAILY
Qty: 30 TABLET | Refills: 5 | Status: SHIPPED | OUTPATIENT
Start: 2018-08-10 | End: 2019-01-30 | Stop reason: SDUPTHER

## 2018-09-27 ENCOUNTER — OFFICE VISIT (OUTPATIENT)
Dept: CARDIOLOGY CLINIC | Facility: CLINIC | Age: 53
End: 2018-09-27
Payer: COMMERCIAL

## 2018-09-27 VITALS
DIASTOLIC BLOOD PRESSURE: 84 MMHG | WEIGHT: 210.7 LBS | OXYGEN SATURATION: 97 % | HEIGHT: 70 IN | HEART RATE: 88 BPM | BODY MASS INDEX: 30.16 KG/M2 | SYSTOLIC BLOOD PRESSURE: 126 MMHG

## 2018-09-27 DIAGNOSIS — E78.1 ESSENTIAL HYPERTRIGLYCERIDEMIA: ICD-10-CM

## 2018-09-27 DIAGNOSIS — Z82.49 FAMILY HISTORY OF CARDIOMYOPATHY: ICD-10-CM

## 2018-09-27 DIAGNOSIS — I10 BENIGN ESSENTIAL HYPERTENSION: Primary | ICD-10-CM

## 2018-09-27 DIAGNOSIS — Z82.49 FAMILY HISTORY OF MYOCARDIAL INFARCTION: ICD-10-CM

## 2018-09-27 PROCEDURE — 99204 OFFICE O/P NEW MOD 45 MIN: CPT | Performed by: INTERNAL MEDICINE

## 2018-09-27 PROCEDURE — 93000 ELECTROCARDIOGRAM COMPLETE: CPT | Performed by: INTERNAL MEDICINE

## 2018-09-27 RX ORDER — SIMVASTATIN 40 MG
40 TABLET ORAL
Qty: 90 TABLET | Refills: 11 | Status: SHIPPED | OUTPATIENT
Start: 2018-09-27 | End: 2019-02-08 | Stop reason: SDUPTHER

## 2018-09-27 NOTE — PATIENT INSTRUCTIONS
Please stop atorvastatin for at least 1 month, pay close attention to your leg symptoms and see if they resolve  If they do resolve, I have sent in simvastatin to replace the atorvastatin  I would like you to repeat your cholesterol in approximately 4 months, at this point that should reflect the changed to simvastatin, hopefully in increase in exercise as we discussed, preferably for you exercising Friday Saturday Sunday when year off would be ideal   At least 30 minutes of cardiovascular exercise  In terms of dietary modification, carbohydrate limitation will improve triglycerides, increasing fish in you diet at least twice a week will improve your triglycerides, cutting out sausage, carmen, ground beef, [employer], hot dogs etc will also improve her triglycerides, and cutting out creamy dressings on salad, as well as all cheese  What Are the Benefits of Omega-3 Fatty Acids? Eating fats that provide omega-3 fatty acids may reduce risk of heart disease  There are three types of omega-3 fatty acids:  Alpha linolenic acid: This is in some vegetable oils, nuts, seeds, and soy foods  EPA and DHA: These are both found in fatty fish (salmon, tuna, mackerel, and sardines)  EPA and DHA are also known as long-chain omega-3 fatty acids   They seem to provide the most benefit for the heart  Tips for Adding Omega-3 Fatty Acids to Your Meal Plan  Select oils that provide omega-3 fat, such as canola oil or soybean oil  Add flaxseed oil, which is very high in omega-3 fat, to foods  If you use fresh flaxseed, is ground in a  for use  Your body cannot digest the beneficial fat if the seeds are left whole  Enjoy walnuts  The walnut is the only common nut with alpha linolenic acid  Try walnut oil in salad dressings  Have two 4-ounce portions of fatty fish weekly  Enjoy salmon, albacore tuna, mackerel, or sardines  Try new ways to cook fish  Remember that fish cooks quickly   Try poaching it in an orange juice and herb mixture  Or bake fish with vegetables  Read the labels on egg cartons and choose eggs that are high in omega-3 fatty acids  These eggs have more omega-3 fats than regular eggs because of the type of feed given to the chickens that lay them  Remember, however, that all egg yolks contain cholesterol  Fish Oil Supplements  If you wish to take fish oil supplements, the American Heart Association recommends that people with heart disease get 1 gram of omega-3 fatty acids from a combination of EPA and DHA per day  Copyright © Academy of Nutrition and Dietetics  For individual nutrition counseling get a referral from your Doctor   and call [phone](option2)

## 2018-09-27 NOTE — LETTER
September 27, 2018     Emilee Jamil, [address] [facility]  [address] [place] [address]    Patient: Amelia Myles   YOB: 1965   Date of Visit: 9/27/2018       Dear Dr Walter Giang:    Thank you for referring Kashmir Menjivar to me for evaluation  Below are my notes for this consultation  If you have questions, please do not hesitate to call me  I look forward to following your patient along with you           Sincerely,        Zain Patel MD        CC: No Recipients

## 2018-09-27 NOTE — PROGRESS NOTES
58 [address] [facility]iology  Office Consultation  Nathan Arriaga 48 y o  male MRN: [id number]        Problems    1  Benign essential hypertension  POCT ECG    Ambulatory referral to Cardiology   2  Essential hypertriglyceridemia  Lipid panel    simvastatin (ZOCOR) 40 mg tablet   3  Family history of myocardial infarction     4  Family history of cardiomyopathy         Impression:    Dear Dr Connie Galeana,    Thank you for sending Harris Marks to my office for evaluation of cholesterol, hypertension, cardiovascular risk  Hypertension  Currently seems controlled in the absence of any medical therapy, he was getting lightheaded and recently weaned off medical therapy, can continue off at this time  But does need to increase exercise for better blood pressure control overall  Mixed hyperlipidemia  Low HDL 37, elevated triglycerides over 300, LDL at goal at 76 but stating he has significant issues with I discomfort and currently takes atorvastatin 20 mg  Family history of premature CAD/MI  Referred for screening treadmill stress test     Plan    Cut out all cheese  Add planned based omega-3, a handout was provided  Decreased consumption of fatty meat such as sausage, carmen, ground beef etc  Add fish to the diet at least twice a week  Increase exercise, based on his schedule, Friday, Saturday, Sunday when home would be best for 30 minutes of cardiovascular exercise per  Refer for screening treadmill stress test      Reason for Consult / Principal Problem:  Family history of premature CAD    HPI: Nathan Arriaga is a 48y o  year old male who comes to see me at the request of Dr Connie Galeana for premature family history of CAD  Dad had an MI in his early 46s, developed ischemic cardiomyopathy, required a cardiac transplantation  Harris Marks has had issues with hypertension and mixed hyperlipidemia  Currently on atorvastatin 20 mg which is not well tolerated due to thigh pain, LDL 76, triglycerides 324, HDL 37    We discussed his cholesterol panel, the issues with residual triglyceride elevation  He has normal EKG in the office  He denies any chest pain, he denies any exertional symptoms, he denies shortness of breath, lightheadedness, palpitations  He previously was lightheaded but weaned off of antihypertensive therapy with recent 25 lb weight loss, blood pressures are currently controlled, and symptoms have resolved  He does not do a lot of exercise, he works in [place] at least 4 days a week so therefore travels fairly extensively in those are long days, does not think he can exercise on those days  He is not getting any other formal exercise  Review of Systems   Constitutional: Negative  HENT: Negative  Eyes: Negative  Respiratory: Negative  Cardiovascular: Negative  Gastrointestinal: Negative  Endocrine: Negative  Genitourinary: Negative  Musculoskeletal: Positive for myalgias  Skin: Negative  Neurological: Negative  Hematological: Negative  Psychiatric/Behavioral: Negative  Past Medical History:   Diagnosis Date    Chronic sinusitis     LAST ASSESSED: [id number]    Closed fracture of lower jaw bone (HCC)     Hyperlipidemia     Seasonal allergies     Sleep apnea     Doesn't require cpap post UPP surgery     Past Surgical History:   Procedure Laterality Date    MANDIBLE FRACTURE SURGERY      as a child    IA COLONOSCOPY FLX DX W/COLLJ SPEC WHEN PFRMD N/A 2/20/2017    Procedure: COLONOSCOPY;  Surgeon: Mendez Cavazos MD;  [facility];   Service: Gastroenterology    IA TREY BRANTLEY/[facility]SSA PHILLIPS [facility]  8/31/2017    Procedure: RIGHT DISTAL BICEPS TENDON REPAIR;  Surgeon: Amrit Swanson MD;  Location: [facility];  Service: Orthopedics    SINUS SURGERY      TONSILLECTOMY      TONSILLECTOMY AND ADENOIDECTOMY      UVULOPALATOPHARYNGOPLASTY      WISDOM TOOTH EXTRACTION       History   Alcohol Use    Yes     Comment: social      History   Drug Use No     History Smoking Status    Never Smoker   Smokeless Tobacco    Never Used     Family History   Problem Relation Age of Onset    Cancer Maternal Grandfather     Diabetes Mother     Hypertension Mother     Heart attack Father 48        ACUTE MI    Cardiomyopathy Father     Hypertension Brother     Stroke Paternal Grandfather         SYNDROME    Anuerysm Neg Hx     Clotting disorder Neg Hx     Heart failure Neg Hx     Coronary artery disease Neg Hx     Sudden death Neg Hx         scd       Allergies: Allergies   Allergen Reactions    Dust Mite Extract Sneezing     --sinus infection     Other Other (See Comments)     Annotation - [id number]: Hayfever-sinus infection        Medications:     Current Outpatient Prescriptions:     albuterol (VENTOLIN HFA) 90 mcg/act inhaler, Inhale 2 puffs, Disp: , Rfl:     Cholecalciferol (VITAMIN D3) 1000 units CAPS, Take by mouth, Disp: , Rfl:     co-enzyme Q-10 100 mg capsule, Take by mouth, Disp: , Rfl:     fluticasone (FLONASE) 50 mcg/act nasal spray, 2 sprays into each nostril daily, Disp: 1 Bottle, Rfl: 5    levocetirizine (XYZAL) 5 MG tablet, TAKE 1 TABLET DAILY IN THE EVENING , Disp: 30 tablet, Rfl: 5    levothyroxine 137 mcg tablet, Take 1 tablet (137 mcg total) by mouth daily, Disp: 30 tablet, Rfl: 5    Magnesium 100 MG CAPS, Take by mouth daily, Disp: , Rfl:     Multiple Vitamin (DAILY VALUE MULTIVITAMIN) TABS, Take by mouth, Disp: , Rfl:     simvastatin (ZOCOR) 40 mg tablet, Take 1 tablet (40 mg total) by mouth daily at bedtime, Disp: 90 tablet, Rfl: 11      Vitals:    09/27/18 0911   BP: 126/84   Pulse: 88   SpO2: 97%     Weight (last 2 days)     Date/Time   Weight    09/27/18 0911  95 6 (210 7)            Physical Exam   Constitutional: He is oriented to person, place, and time  He appears well-developed and well-nourished  No distress  HENT:   Head: Normocephalic and atraumatic     Mouth/Throat: Oropharynx is clear and moist    Eyes: Pupils are equal, round, and reactive to light  Conjunctivae and EOM are normal  No scleral icterus  Neck: Normal range of motion  Neck supple  No JVD present  No thyromegaly present  Cardiovascular: Normal rate, regular rhythm, normal heart sounds and intact distal pulses  Exam reveals no gallop and no friction rub  No murmur heard  Pulmonary/Chest: Effort normal and breath sounds normal  No respiratory distress  He has no wheezes  He has no rales  Abdominal: Soft  Bowel sounds are normal  He exhibits no distension  There is no tenderness  Musculoskeletal: Normal range of motion  He exhibits no edema or deformity  Neurological: He is alert and oriented to person, place, and time  No cranial nerve deficit  Skin: Skin is warm and dry  No rash noted  He is not diaphoretic  No erythema  Psychiatric: He has a normal mood and affect           Laboratory Studies:  Lab Results   Component Value Date     11/24/2017     05/05/2017     12/16/2016    K 4 4 11/24/2017    K 4 5 05/05/2017    K 4 5 12/16/2016     06/01/2018    CL 99 11/24/2017     05/05/2017    CL 98 12/16/2016    CO2 23 06/01/2018    CO2 26 05/05/2017    CO2 26 12/16/2016    CO2 24 05/27/2016    GLUCOSE 101 (H) 11/24/2017    GLUCOSE 101 (H) 05/05/2017    GLUCOSE 94 12/16/2016    CREATININE 1 06 06/01/2018    CREATININE 1 10 11/24/2017    CREATININE 1 21 05/05/2017    CREATININE 1 25 12/16/2016    BUN 21 06/01/2018    BUN 17 11/24/2017    BUN 18 05/05/2017    BUN 22 12/16/2016     Lab Results   Component Value Date    WBC 5 9 06/01/2018    WBC 5 73 06/27/2014    RBC 4 52 06/01/2018    RBC 4 72 06/27/2014    HGB 13 7 06/01/2018    HGB 14 0 06/27/2014    HCT 41 0 06/01/2018    HCT 40 9 06/27/2014    MCV 91 06/01/2018    MCV 87 06/27/2014    MCH 30 3 06/01/2018    MCH 29 7 06/27/2014    RDW 13 5 06/01/2018    RDW 13 1 06/27/2014     06/01/2018     06/27/2014     NT-proBNP: No results found for: NTBNP   CBC:  Lab Results Component Value Date    WBC 5 9 06/01/2018    WBC 5 73 06/27/2014    RBC 4 52 06/01/2018    RBC 4 72 06/27/2014    HGB 13 7 06/01/2018    HGB 14 0 06/27/2014    HCT 41 0 06/01/2018    HCT 40 9 06/27/2014    MCV 91 06/01/2018    MCV 87 06/27/2014    MCH 30 3 06/01/2018    MCH 29 7 06/27/2014    RDW 13 5 06/01/2018    RDW 13 1 06/27/2014     06/01/2018     06/27/2014     Coags:    Lipid Profile:   Lab Results   Component Value Date    CHOL 168 11/24/2017     Lab Results   Component Value Date    HDL 37 (L) 06/01/2018     Lab Results   Component Value Date    LDLCALC 99 11/24/2017     Lab Results   Component Value Date    TRIG 324 (H) 06/01/2018       Cardiac testing:     EKG reviewed personally:  Normal sinus rhythm, normal EKG    No results found for this or any previous visit  No results found for this or any previous visit  No results found for this or any previous visit  No results found for this or any previous visit  John Colon MD    Portions of the record may have been created with voice recognition software   Occasional wrong word or "sound a like" substitutions may have occurred due to the inherent limitations of voice recognition software   Read the chart carefully and recognize, using context, where substitutions have occurred

## 2018-10-27 DIAGNOSIS — J30.1 SEASONAL ALLERGIC RHINITIS DUE TO POLLEN: ICD-10-CM

## 2018-10-27 RX ORDER — LEVOCETIRIZINE DIHYDROCHLORIDE 5 MG/1
TABLET, FILM COATED ORAL
Qty: 30 TABLET | Refills: 5 | Status: SHIPPED | OUTPATIENT
Start: 2018-10-27 | End: 2019-05-07 | Stop reason: SDUPTHER

## 2018-11-01 ENCOUNTER — TELEPHONE (OUTPATIENT)
Dept: CARDIOLOGY CLINIC | Facility: CLINIC | Age: 53
End: 2018-11-01

## 2018-11-01 NOTE — TELEPHONE ENCOUNTER
Well then it is not atorvastatin  Well will see if stress test elicits any pain from legs that could suggest something  Otherwise would check with PCP

## 2018-11-01 NOTE — TELEPHONE ENCOUNTER
At last ov on Sept 27,  pt advised to hold statin for one month to see if leg pain stopped  Queen Mckenna called to let you know that he still has the leg pain and discomfort after holding the atorvastatin  He is asking what to do now  Please advise

## 2018-11-07 DIAGNOSIS — E03.8 HYPOTHYROIDISM DUE TO HASHIMOTO'S THYROIDITIS: ICD-10-CM

## 2018-11-07 DIAGNOSIS — E78.2 MIXED HYPERLIPIDEMIA: Primary | ICD-10-CM

## 2018-11-07 DIAGNOSIS — E06.3 HYPOTHYROIDISM DUE TO HASHIMOTO'S THYROIDITIS: ICD-10-CM

## 2018-11-09 ENCOUNTER — TELEPHONE (OUTPATIENT)
Dept: CARDIOLOGY CLINIC | Facility: CLINIC | Age: 53
End: 2018-11-09

## 2018-11-09 ENCOUNTER — HOSPITAL ENCOUNTER (OUTPATIENT)
Dept: NON INVASIVE DIAGNOSTICS | Facility: CLINIC | Age: 53
Discharge: HOME | End: 2018-11-09
Payer: COMMERCIAL

## 2018-11-09 DIAGNOSIS — Z82.49 FAMILY HISTORY OF MYOCARDIAL INFARCTION: ICD-10-CM

## 2018-11-09 DIAGNOSIS — E78.1 ESSENTIAL HYPERTRIGLYCERIDEMIA: ICD-10-CM

## 2018-11-09 DIAGNOSIS — I10 BENIGN ESSENTIAL HYPERTENSION: ICD-10-CM

## 2018-11-09 LAB
CHEST PAIN STATEMENT: NORMAL
MAX DIASTOLIC BP: 98 MMHG
MAX HEART RATE: 169 BPM
MAX PREDICTED HEART RATE: 169 BPM
MAX. SYSTOLIC BP: 182 MMHG
PROTOCOL NAME: NORMAL
REASON FOR TERMINATION: NORMAL
TARGET HR FORMULA: NORMAL
TIME IN EXERCISE PHASE: NORMAL

## 2018-11-09 PROCEDURE — 93017 CV STRESS TEST TRACING ONLY: CPT

## 2018-11-09 PROCEDURE — 93016 CV STRESS TEST SUPVJ ONLY: CPT | Performed by: INTERNAL MEDICINE

## 2018-11-09 PROCEDURE — 93018 CV STRESS TEST I&R ONLY: CPT | Performed by: INTERNAL MEDICINE

## 2018-11-09 NOTE — TELEPHONE ENCOUNTER
Called and spoke to Kavitha Shi regarding his normal stress test as per Dr Camilo Nova      ----- Message from Venkat Minaya MD sent at 11/9/2018  3:04 PM EST -----  Please make patient aware that this test was normal

## 2018-11-12 LAB
CHOLEST SERPL-MCNC: 229 MG/DL (ref 100–199)
TSH SERPL DL<=0.005 MIU/L-ACNC: 2.76 UIU/ML (ref 0.45–4.5)

## 2018-11-14 ENCOUNTER — TELEPHONE (OUTPATIENT)
Dept: FAMILY MEDICINE CLINIC | Facility: CLINIC | Age: 53
End: 2018-11-14

## 2018-12-07 LAB
HDLC SERPL-MCNC: 35 MG/DL
LABCORP COMMENT: NORMAL
LDLC SERPL DIRECT ASSAY-MCNC: 135 MG/DL (ref 0–99)
TRIGL SERPL-MCNC: 288 MG/DL (ref 0–149)

## 2019-01-30 DIAGNOSIS — E03.1 CONGENITAL HYPOTHYROIDISM WITHOUT GOITER: ICD-10-CM

## 2019-01-30 RX ORDER — LEVOTHYROXINE SODIUM 137 UG/1
137 TABLET ORAL DAILY
Qty: 30 TABLET | Refills: 0 | Status: SHIPPED | OUTPATIENT
Start: 2019-01-30 | End: 2019-02-08 | Stop reason: SDUPTHER

## 2019-02-08 ENCOUNTER — OFFICE VISIT (OUTPATIENT)
Dept: FAMILY MEDICINE CLINIC | Facility: CLINIC | Age: 54
End: 2019-02-08
Payer: COMMERCIAL

## 2019-02-08 ENCOUNTER — APPOINTMENT (OUTPATIENT)
Dept: RADIOLOGY | Facility: OTHER | Age: 54
End: 2019-02-08
Payer: COMMERCIAL

## 2019-02-08 ENCOUNTER — OFFICE VISIT (OUTPATIENT)
Dept: OBGYN CLINIC | Facility: OTHER | Age: 54
End: 2019-02-08
Payer: COMMERCIAL

## 2019-02-08 VITALS
BODY MASS INDEX: 29.35 KG/M2 | HEIGHT: 70 IN | HEART RATE: 75 BPM | SYSTOLIC BLOOD PRESSURE: 145 MMHG | DIASTOLIC BLOOD PRESSURE: 87 MMHG | WEIGHT: 205 LBS

## 2019-02-08 VITALS
WEIGHT: 207 LBS | SYSTOLIC BLOOD PRESSURE: 124 MMHG | DIASTOLIC BLOOD PRESSURE: 84 MMHG | RESPIRATION RATE: 16 BRPM | TEMPERATURE: 97.3 F | HEART RATE: 86 BPM | HEIGHT: 69 IN | BODY MASS INDEX: 30.66 KG/M2 | OXYGEN SATURATION: 97 %

## 2019-02-08 DIAGNOSIS — G89.29 CHRONIC PAIN OF LEFT KNEE: ICD-10-CM

## 2019-02-08 DIAGNOSIS — E03.8 OTHER SPECIFIED HYPOTHYROIDISM: ICD-10-CM

## 2019-02-08 DIAGNOSIS — M25.562 CHRONIC PAIN OF LEFT KNEE: ICD-10-CM

## 2019-02-08 DIAGNOSIS — E78.1 ESSENTIAL HYPERTRIGLYCERIDEMIA: ICD-10-CM

## 2019-02-08 DIAGNOSIS — M76.52 PATELLAR TENDINITIS OF LEFT KNEE: Primary | ICD-10-CM

## 2019-02-08 DIAGNOSIS — Z12.5 SCREENING FOR PROSTATE CANCER: ICD-10-CM

## 2019-02-08 DIAGNOSIS — E03.9 HYPOTHYROIDISM, UNSPECIFIED TYPE: Primary | ICD-10-CM

## 2019-02-08 DIAGNOSIS — M76.52 PATELLAR TENDINITIS OF LEFT KNEE: ICD-10-CM

## 2019-02-08 PROCEDURE — 99214 OFFICE O/P EST MOD 30 MIN: CPT | Performed by: ORTHOPAEDIC SURGERY

## 2019-02-08 PROCEDURE — 99214 OFFICE O/P EST MOD 30 MIN: CPT | Performed by: INTERNAL MEDICINE

## 2019-02-08 PROCEDURE — 1036F TOBACCO NON-USER: CPT | Performed by: INTERNAL MEDICINE

## 2019-02-08 PROCEDURE — 73562 X-RAY EXAM OF KNEE 3: CPT

## 2019-02-08 RX ORDER — SIMVASTATIN 40 MG
40 TABLET ORAL
Qty: 90 TABLET | Refills: 1 | Status: SHIPPED | OUTPATIENT
Start: 2019-02-08 | End: 2019-05-09

## 2019-02-08 RX ORDER — LEVOTHYROXINE SODIUM 137 UG/1
137 TABLET ORAL DAILY
Qty: 90 TABLET | Refills: 1 | Status: SHIPPED | OUTPATIENT
Start: 2019-02-08 | End: 2019-08-31 | Stop reason: SDUPTHER

## 2019-02-08 NOTE — PROGRESS NOTES
Assessment/Plan:         Diagnoses and all orders for this visit:    Hypothyroidism, unspecified type  -     TSH, 3rd generation; Future  -     T4, free; Future    Screening for prostate cancer  -     PSA, total and free; Future    Other specified hypothyroidism  -     levothyroxine 137 mcg tablet; Take 1 tablet (137 mcg total) by mouth daily    Essential hypertriglyceridemia  -     simvastatin (ZOCOR) 40 mg tablet; Take 1 tablet (40 mg total) by mouth daily at bedtime          Subjective:      Patient ID: Vilma Vick is a 48 y o  male  Pt complains of low thigh upper lower leg pain  It is relieved with nsaid  He is to start pt for patellar tendinitis  He follows with card  His dad had heart transplant age 48  He received flu shot at work        The following portions of the patient's history were reviewed and updated as appropriate: He  has a past medical history of Chronic sinusitis, Closed fracture of lower jaw bone ([address] ), Hyperlipidemia, Seasonal allergies, and Sleep apnea  He   Patient Active Problem List    Diagnosis Date Noted    Chronic pain of left knee 02/08/2019    Patellar tendinitis of left knee 02/08/2019    Family history of cardiomyopathy 09/27/2018    Family history of myocardial infarction 09/27/2018    Essential hypertriglyceridemia 01/18/2016    Hypothyroidism 01/06/2015    Benign essential hypertension 08/13/2014    Psoriasis 08/13/2014    Vitamin D deficiency 06/30/2014     He  has a past surgical history that includes Uvulopalatopharyngoplasty; Mandible fracture surgery; Tonsillectomy; pr colonoscopy flx dx w/collj spec when pfrmd (N/A, 2/20/2017); pr reinsert bi/triceps tendon,distal (8/31/2017); [place] tooth extraction; Sinus surgery; and Tonsillectomy and adenoidectomy    His family history includes Cancer in his maternal grandfather; Cardiomyopathy in his father; Diabetes in his mother; Heart attack (age of onset: 48) in his father; Hypertension in his brother and mother; Stroke in his paternal grandfather  He  reports that he has never smoked  He has never used smokeless tobacco  He reports that he drinks alcohol  He reports that he does not use drugs  Current Outpatient Medications   Medication Sig Dispense Refill    albuterol (VENTOLIN HFA) 90 mcg/act inhaler Inhale 2 puffs      Cholecalciferol (VITAMIN D3) 1000 units CAPS Take by mouth      co-enzyme Q-10 100 mg capsule Take by mouth      fluticasone (FLONASE) 50 mcg/act nasal spray 2 sprays into each nostril daily 1 Bottle 5    levocetirizine (XYZAL) 5 MG tablet TAKE 1 TABLET EVERY DAY IN THE EVENING 30 tablet 5    levothyroxine 137 mcg tablet Take 1 tablet (137 mcg total) by mouth daily 90 tablet 1    Magnesium 100 MG CAPS Take by mouth daily      Multiple Vitamin (DAILY VALUE MULTIVITAMIN) TABS Take by mouth      simvastatin (ZOCOR) 40 mg tablet Take 1 tablet (40 mg total) by mouth daily at bedtime 90 tablet 1     No current facility-administered medications for this visit  Current Outpatient Medications on File Prior to Visit   Medication Sig    albuterol (VENTOLIN HFA) 90 mcg/act inhaler Inhale 2 puffs    Cholecalciferol (VITAMIN D3) 1000 units CAPS Take by mouth    co-enzyme Q-10 100 mg capsule Take by mouth    fluticasone (FLONASE) 50 mcg/act nasal spray 2 sprays into each nostril daily    levocetirizine (XYZAL) 5 MG tablet TAKE 1 TABLET EVERY DAY IN THE EVENING    Magnesium 100 MG CAPS Take by mouth daily    Multiple Vitamin (DAILY VALUE MULTIVITAMIN) TABS Take by mouth     No current facility-administered medications on file prior to visit  He is allergic to dust mite extract and other       Review of Systems   Constitutional: Negative  HENT: Negative  Respiratory: Negative  Cardiovascular: Negative  Musculoskeletal: Positive for myalgias           Objective:      /84 (BP Location: Right arm, Patient Position: Sitting, Cuff Size: Large)   Pulse 86   Temp (!) 97 3 °F (36 3 °C) (Tympanic)   Resp 16   Ht 5' 9" (1 753 m)   Wt 93 9 kg (207 lb)   SpO2 97%   BMI 30 57 kg/m²          Physical Exam   Constitutional: He appears well-developed and well-nourished  No distress  HENT:   Head: Normocephalic and atraumatic  Right Ear: External ear normal    Left Ear: External ear normal    Nose: Nose normal    Mouth/Throat: Oropharynx is clear and moist  No oropharyngeal exudate  Neck: Normal range of motion  Neck supple  No thyromegaly present  Cardiovascular: Normal rate, regular rhythm and normal heart sounds  Exam reveals no gallop  No murmur heard  Pulmonary/Chest: Effort normal and breath sounds normal  No respiratory distress  He has no wheezes  He has no rales  He exhibits no tenderness  Abdominal: Soft  Bowel sounds are normal  He exhibits no distension  There is no tenderness  There is no rebound and no guarding  Genitourinary:   Genitourinary Comments: Declined delgado   Lymphadenopathy:     He has no cervical adenopathy  Skin: He is not diaphoretic

## 2019-02-11 NOTE — PROGRESS NOTES
Assessment:       1  Patellar tendinitis of left knee    2  Chronic pain of left knee          Plan:        Explained my current clinical findings and reviewed radiological findings with Mr  Indy Porter  His knee pain is likely secondary to a proximal patellar tendinitis  In this regard I have advised him to wear a patellar tendon strap, take oral nonsteroidal anti-inflammatory medication on an as-needed basis and referred him for a course of physical therapy rehabilitation  Total time taken was 25 minutes of which more than half was spent in counseling regarding patellar tendinosis pathophysiology and management options  Subjective:     Patient ID: Marily Jenkins is a 48 y o  male  Chief Complaint: Left knee pain    HPI  48 y o  Gentleman with left anterior knee pain, nearly an year duration  No recent injury but possibly fell on anterior knee an year ago when symptoms started  Now, worse over last 6-7 months  Sharp and sometimes achy, felt in lower part of kneecap and below it  Non-radiating, worse with knee flexion and improves with rest  No swelling, locking, knee instability  Pain is intermittent and of mild to moderate intensity  Social History     Occupational History    Not on file   Tobacco Use    Smoking status: Never Smoker    Smokeless tobacco: Never Used   Substance and Sexual Activity    Alcohol use: Yes     Comment: social     Drug use: No    Sexual activity: Not on file      Review of Systems   Constitutional: Negative  HENT: Negative  Eyes: Negative  Respiratory: Negative  Cardiovascular: Negative  Gastrointestinal: Negative  Endocrine: Negative  Genitourinary: Negative  Skin: Negative  Allergic/Immunologic: Negative  Neurological: Negative  Psychiatric/Behavioral: Negative  Objective:     Ortho ExamPhysical Exam   Constitutional: He is oriented to person, place, and time  He appears well-developed and well-nourished     HENT: Head: Normocephalic and atraumatic  Eyes: Conjunctivae are normal    Cardiovascular: Normal rate and regular rhythm  Pulmonary/Chest: Effort normal  No respiratory distress  Neurological: He is alert and oriented to person, place, and time  Skin: Skin is warm  No erythema  Psychiatric: He has a normal mood and affect  His behavior is normal  Judgment and thought content normal        left Knee    Swelling: None   Effusion: Negative   Tenderness: Patellar tendon proximally and lower patellofemoral area       Range of Motion:      Extension: Normal     Flexion: Normal       McMurrays: Negative   Anterior Lachmann: Negative   Posterior Lachmann: Negative   Anterior Drawer: Negative   Posterior Drawer: Negative   Varus Stress Test: Negative   Valgus Stress Test: Negative   Pivot Shift: Not done   Patellar Apprehension: No       I have personally reviewed pertinent films in PACS and my interpretation is Plain radiograph of the left knee reveals no acute osseous injury  Mild patellofemoral arthritic change noted in the skyline view as evidenced by a marginal small osteophyte  Office based limited left patellar tendon clinical ultrasound was performed which revealed thickening of the proximal part of the patella tendon with hypoechoic appearance of the deeper part of the proximal patellar tendon along with some neovascularization    These findings were indicative of chronic proximal patellar tendinosis

## 2019-04-29 LAB
CHOLEST SERPL-MCNC: 187 MG/DL (ref 100–199)
HDLC SERPL-MCNC: 32 MG/DL
LABCORP COMMENT: NORMAL
LDLC SERPL CALC-MCNC: ABNORMAL MG/DL (ref 0–99)
PSA FREE MFR SERPL: 54.5 %
PSA FREE SERPL-MCNC: 0.6 NG/ML
PSA SERPL-MCNC: 1.1 NG/ML (ref 0–4)
SL AMB VLDL CHOLESTEROL CALC: ABNORMAL MG/DL (ref 5–40)
T4 FREE SERPL-MCNC: 1.15 NG/DL (ref 0.82–1.77)
TRIGL SERPL-MCNC: 468 MG/DL (ref 0–149)
TSH SERPL DL<=0.005 MIU/L-ACNC: 2.98 UIU/ML (ref 0.45–4.5)

## 2019-05-07 DIAGNOSIS — J30.1 SEASONAL ALLERGIC RHINITIS DUE TO POLLEN: ICD-10-CM

## 2019-05-08 RX ORDER — LEVOCETIRIZINE DIHYDROCHLORIDE 5 MG/1
TABLET, FILM COATED ORAL
Qty: 30 TABLET | Refills: 5 | Status: SHIPPED | OUTPATIENT
Start: 2019-05-08 | End: 2019-12-01 | Stop reason: SDUPTHER

## 2019-05-09 ENCOUNTER — OFFICE VISIT (OUTPATIENT)
Dept: CARDIOLOGY CLINIC | Facility: CLINIC | Age: 54
End: 2019-05-09
Payer: COMMERCIAL

## 2019-05-09 VITALS
HEART RATE: 73 BPM | BODY MASS INDEX: 31.49 KG/M2 | WEIGHT: 212.6 LBS | HEIGHT: 69 IN | SYSTOLIC BLOOD PRESSURE: 136 MMHG | OXYGEN SATURATION: 96 % | DIASTOLIC BLOOD PRESSURE: 90 MMHG

## 2019-05-09 DIAGNOSIS — I10 BENIGN ESSENTIAL HYPERTENSION: ICD-10-CM

## 2019-05-09 DIAGNOSIS — Z82.49 FAMILY HISTORY OF CARDIOMYOPATHY: ICD-10-CM

## 2019-05-09 DIAGNOSIS — Z82.49 FAMILY HISTORY OF MYOCARDIAL INFARCTION: ICD-10-CM

## 2019-05-09 DIAGNOSIS — E78.1 ESSENTIAL HYPERTRIGLYCERIDEMIA: Primary | ICD-10-CM

## 2019-05-09 PROCEDURE — 99215 OFFICE O/P EST HI 40 MIN: CPT | Performed by: INTERNAL MEDICINE

## 2019-05-09 RX ORDER — ROSUVASTATIN CALCIUM 20 MG/1
20 TABLET, COATED ORAL DAILY
Qty: 30 TABLET | Refills: 11 | Status: SHIPPED | OUTPATIENT
Start: 2019-05-09 | End: 2020-06-16 | Stop reason: SDUPTHER

## 2019-05-09 RX ORDER — LISINOPRIL 5 MG/1
5 TABLET ORAL DAILY
Qty: 30 TABLET | Refills: 11 | Status: SHIPPED | OUTPATIENT
Start: 2019-05-09 | End: 2020-10-01 | Stop reason: SDUPTHER

## 2019-05-09 RX ORDER — CHLORAL HYDRATE 500 MG
1000 CAPSULE ORAL DAILY
COMMUNITY
End: 2019-05-09

## 2019-05-09 RX ORDER — OMEGA-3-ACID ETHYL ESTERS 1 G/1
2 CAPSULE, LIQUID FILLED ORAL 2 TIMES DAILY
Qty: 120 CAPSULE | Refills: 11 | Status: SHIPPED | OUTPATIENT
Start: 2019-05-09 | End: 2021-03-05 | Stop reason: SDUPTHER

## 2019-05-10 ENCOUNTER — TELEPHONE (OUTPATIENT)
Dept: CARDIOLOGY CLINIC | Facility: CLINIC | Age: 54
End: 2019-05-10

## 2019-08-15 ENCOUNTER — OFFICE VISIT (OUTPATIENT)
Dept: FAMILY MEDICINE CLINIC | Facility: CLINIC | Age: 54
End: 2019-08-15
Payer: COMMERCIAL

## 2019-08-15 VITALS
RESPIRATION RATE: 16 BRPM | SYSTOLIC BLOOD PRESSURE: 106 MMHG | BODY MASS INDEX: 30.81 KG/M2 | OXYGEN SATURATION: 98 % | HEIGHT: 69 IN | DIASTOLIC BLOOD PRESSURE: 78 MMHG | HEART RATE: 55 BPM | TEMPERATURE: 95.3 F | WEIGHT: 208 LBS

## 2019-08-15 DIAGNOSIS — I10 BENIGN ESSENTIAL HYPERTENSION: ICD-10-CM

## 2019-08-15 DIAGNOSIS — E03.9 HYPOTHYROIDISM, UNSPECIFIED TYPE: Primary | ICD-10-CM

## 2019-08-15 DIAGNOSIS — R53.83 FATIGUE, UNSPECIFIED TYPE: ICD-10-CM

## 2019-08-15 PROCEDURE — 3008F BODY MASS INDEX DOCD: CPT | Performed by: INTERNAL MEDICINE

## 2019-08-15 PROCEDURE — 99214 OFFICE O/P EST MOD 30 MIN: CPT | Performed by: INTERNAL MEDICINE

## 2019-08-15 PROCEDURE — 3074F SYST BP LT 130 MM HG: CPT | Performed by: INTERNAL MEDICINE

## 2019-08-15 NOTE — PROGRESS NOTES
Assessment/Plan:    No problem-specific Assessment & Plan notes found for this encounter  Diagnoses and all orders for this visit:    Hypothyroidism, unspecified type  -     TSH, 3rd generation; Future  -     T4, free; Future    Fatigue, unspecified type  -     Comprehensive metabolic panel; Future  -     CBC; Future    Benign essential hypertension    Other orders  -     Probiotic Product (PROBIOTIC PO); Take by mouth          Subjective:      Patient ID: Quincy Hyman is a 48 y o  male  Pt due for lab  Reviewed meds  Denies cp/sob/h/a/ s/s of thyroid dx denies daytime sleepiness        The following portions of the patient's history were reviewed and updated as appropriate: He  has a past medical history of Chronic sinusitis, Closed fracture of lower jaw bone ([address] ), Hyperlipidemia, Seasonal allergies, and Sleep apnea  He   Patient Active Problem List    Diagnosis Date Noted    Chronic pain of left knee 02/08/2019    Patellar tendinitis of left knee 02/08/2019    Family history of cardiomyopathy 09/27/2018    Family history of myocardial infarction 09/27/2018    Essential hypertriglyceridemia 01/18/2016    Hypothyroidism 01/06/2015    Benign essential hypertension 08/13/2014    Psoriasis 08/13/2014    Vitamin D deficiency 06/30/2014     He  has a past surgical history that includes Uvulopalatopharyngoplasty; Mandible fracture surgery; Tonsillectomy; pr colonoscopy flx dx w/collj spec when pfrmd (N/A, 2/20/2017); pr reinsert bi/triceps tendon,distal (8/31/2017); [place] tooth extraction; Sinus surgery; and Tonsillectomy and adenoidectomy  His family history includes Cancer in his maternal grandfather; Cardiomyopathy in his father; Diabetes in his mother; Heart attack (age of onset: 48) in his father; Hypertension in his brother and mother; Stroke in his paternal grandfather  He  reports that he has never smoked  He has never used smokeless tobacco  He reports that he drinks alcohol   He reports that he does not use drugs  Current Outpatient Medications   Medication Sig Dispense Refill    Cholecalciferol (VITAMIN D3) 1000 units CAPS Take by mouth      co-enzyme Q-10 100 mg capsule Take by mouth      fluticasone (FLONASE) 50 mcg/act nasal spray 2 sprays into each nostril daily 1 Bottle 5    levocetirizine (XYZAL) 5 MG tablet TAKE 1 TABLET EVERY EVENING 30 tablet 5    levothyroxine 137 mcg tablet Take 1 tablet (137 mcg total) by mouth daily 90 tablet 1    lisinopril (ZESTRIL) 5 mg tablet Take 1 tablet (5 mg total) by mouth daily 30 tablet 11    Magnesium 100 MG CAPS Take by mouth daily      Multiple Vitamin (DAILY VALUE MULTIVITAMIN) TABS Take by mouth      omega-3-acid ethyl esters (LOVAZA) 1 g capsule Take 2 capsules (2 g total) by mouth 2 (two) times a day 120 capsule 11    Probiotic Product (PROBIOTIC PO) Take by mouth      rosuvastatin (CRESTOR) 20 MG tablet Take 1 tablet (20 mg total) by mouth daily 30 tablet 11     No current facility-administered medications for this visit  Current Outpatient Medications on File Prior to Visit   Medication Sig    Cholecalciferol (VITAMIN D3) 1000 units CAPS Take by mouth    co-enzyme Q-10 100 mg capsule Take by mouth    fluticasone (FLONASE) 50 mcg/act nasal spray 2 sprays into each nostril daily    levocetirizine (XYZAL) 5 MG tablet TAKE 1 TABLET EVERY EVENING    levothyroxine 137 mcg tablet Take 1 tablet (137 mcg total) by mouth daily    lisinopril (ZESTRIL) 5 mg tablet Take 1 tablet (5 mg total) by mouth daily    Magnesium 100 MG CAPS Take by mouth daily    Multiple Vitamin (DAILY VALUE MULTIVITAMIN) TABS Take by mouth    omega-3-acid ethyl esters (LOVAZA) 1 g capsule Take 2 capsules (2 g total) by mouth 2 (two) times a day    Probiotic Product (PROBIOTIC PO) Take by mouth    rosuvastatin (CRESTOR) 20 MG tablet Take 1 tablet (20 mg total) by mouth daily     No current facility-administered medications on file prior to visit  He is allergic to dust mite extract and other       Review of Systems   Constitutional: Negative  HENT: Negative  Respiratory: Negative  Negative for shortness of breath  Cardiovascular: Negative  Negative for chest pain  Gastrointestinal: Negative for constipation  Endocrine: Negative for cold intolerance and heat intolerance  Neurological: Negative for headaches  Objective:      /78 (BP Location: Left arm, Patient Position: Sitting, Cuff Size: Large)   Pulse 55   Temp (!) 95 3 °F (35 2 °C) (Tympanic)   Resp 16   Ht 5' 9" (1 753 m)   Wt 94 3 kg (208 lb)   SpO2 98%   BMI 30 72 kg/m²          Physical Exam   Constitutional: He appears well-developed and well-nourished  No distress  HENT:   Head: Normocephalic  Right Ear: External ear normal    Left Ear: External ear normal    Nose: Nose normal    Mouth/Throat: Oropharynx is clear and moist  No oropharyngeal exudate  Neck: Normal range of motion  Neck supple  No thyromegaly present  Cardiovascular: Normal rate, regular rhythm, normal heart sounds and intact distal pulses  Exam reveals no gallop and no friction rub  No murmur heard  Pulmonary/Chest: Effort normal and breath sounds normal  No respiratory distress  He has no wheezes  He has no rales  Lymphadenopathy:     He has no cervical adenopathy  Skin: He is not diaphoretic  BMI Counseling: Body mass index is 30 72 kg/m²  Discussed the patient's BMI with him  The BMI is above average  BMI counseling and education was provided to the patient  Nutrition recommendations include reducing portion sizes and decreasing overall calorie intake  Exercise recommendations include exercising 3-5 times per week

## 2019-08-31 DIAGNOSIS — E03.8 OTHER SPECIFIED HYPOTHYROIDISM: ICD-10-CM

## 2019-09-01 RX ORDER — LEVOTHYROXINE SODIUM 137 UG/1
TABLET ORAL
Qty: 30 TABLET | Refills: 5 | Status: SHIPPED | OUTPATIENT
Start: 2019-09-01 | End: 2020-02-21

## 2019-10-30 DIAGNOSIS — J30.1 SEASONAL ALLERGIC RHINITIS DUE TO POLLEN: ICD-10-CM

## 2019-10-30 RX ORDER — LEVOCETIRIZINE DIHYDROCHLORIDE 5 MG/1
TABLET, FILM COATED ORAL
Qty: 30 TABLET | Refills: 5 | OUTPATIENT
Start: 2019-10-30

## 2019-11-06 LAB
ALBUMIN SERPL-MCNC: 4.6 G/DL (ref 3.5–5.5)
ALBUMIN/GLOB SERPL: 2.4 {RATIO} (ref 1.2–2.2)
ALP SERPL-CCNC: 61 IU/L (ref 39–117)
ALT SERPL-CCNC: 33 IU/L (ref 0–44)
AST SERPL-CCNC: 24 IU/L (ref 0–40)
BASOPHILS # BLD AUTO: 0 X10E3/UL (ref 0–0.2)
BASOPHILS NFR BLD AUTO: 1 %
BILIRUB SERPL-MCNC: 0.2 MG/DL (ref 0–1.2)
BUN SERPL-MCNC: 18 MG/DL (ref 6–24)
BUN/CREAT SERPL: 15 (ref 9–20)
CALCIUM SERPL-MCNC: 9.7 MG/DL (ref 8.7–10.2)
CHLORIDE SERPL-SCNC: 104 MMOL/L (ref 96–106)
CHOLEST SERPL-MCNC: 145 MG/DL (ref 100–199)
CK MB SERPL-MCNC: 1.2 NG/ML (ref 0–10.4)
CO2 SERPL-SCNC: 24 MMOL/L (ref 20–29)
CREAT SERPL-MCNC: 1.24 MG/DL (ref 0.76–1.27)
EOSINOPHIL # BLD AUTO: 0.4 X10E3/UL (ref 0–0.4)
EOSINOPHIL NFR BLD AUTO: 5 %
ERYTHROCYTE [DISTWIDTH] IN BLOOD BY AUTOMATED COUNT: 13.6 % (ref 12.3–15.4)
GLOBULIN SER-MCNC: 1.9 G/DL (ref 1.5–4.5)
GLUCOSE SERPL-MCNC: 107 MG/DL (ref 65–99)
HCT VFR BLD AUTO: 39.6 % (ref 37.5–51)
HDLC SERPL-MCNC: 37 MG/DL
HGB BLD-MCNC: 13.5 G/DL (ref 13–17.7)
IMM GRANULOCYTES # BLD: 0 X10E3/UL (ref 0–0.1)
IMM GRANULOCYTES NFR BLD: 0 %
LDLC SERPL CALC-MCNC: 62 MG/DL (ref 0–99)
LYMPHOCYTES # BLD AUTO: 1.8 X10E3/UL (ref 0.7–3.1)
LYMPHOCYTES NFR BLD AUTO: 25 %
MCH RBC QN AUTO: 29.8 PG (ref 26.6–33)
MCHC RBC AUTO-ENTMCNC: 34.1 G/DL (ref 31.5–35.7)
MCV RBC AUTO: 87 FL (ref 79–97)
MONOCYTES # BLD AUTO: 0.5 X10E3/UL (ref 0.1–0.9)
MONOCYTES NFR BLD AUTO: 7 %
NEUTROPHILS # BLD AUTO: 4.4 X10E3/UL (ref 1.4–7)
NEUTROPHILS NFR BLD AUTO: 62 %
PLATELET # BLD AUTO: 205 X10E3/UL (ref 150–450)
POTASSIUM SERPL-SCNC: 4.8 MMOL/L (ref 3.5–5.2)
PROT SERPL-MCNC: 6.5 G/DL (ref 6–8.5)
RBC # BLD AUTO: 4.53 X10E6/UL (ref 4.14–5.8)
SL AMB EGFR AFRICAN AMERICAN: 77 ML/MIN/1.73
SL AMB EGFR NON AFRICAN AMERICAN: 66 ML/MIN/1.73
SL AMB VLDL CHOLESTEROL CALC: 46 MG/DL (ref 5–40)
SODIUM SERPL-SCNC: 144 MMOL/L (ref 134–144)
T4 FREE SERPL-MCNC: 1.27 NG/DL (ref 0.82–1.77)
TRIGL SERPL-MCNC: 231 MG/DL (ref 0–149)
TSH SERPL DL<=0.005 MIU/L-ACNC: 1.73 UIU/ML (ref 0.45–4.5)
WBC # BLD AUTO: 7.1 X10E3/UL (ref 3.4–10.8)

## 2019-11-13 RX ORDER — TOFACITINIB 5 MG/1
TABLET, FILM COATED ORAL
COMMUNITY
Start: 2019-10-09 | End: 2021-09-21

## 2019-11-15 ENCOUNTER — OFFICE VISIT (OUTPATIENT)
Dept: CARDIOLOGY CLINIC | Facility: CLINIC | Age: 54
End: 2019-11-15
Payer: COMMERCIAL

## 2019-11-15 VITALS
DIASTOLIC BLOOD PRESSURE: 68 MMHG | WEIGHT: 208 LBS | HEART RATE: 70 BPM | SYSTOLIC BLOOD PRESSURE: 110 MMHG | HEIGHT: 69 IN | BODY MASS INDEX: 30.81 KG/M2

## 2019-11-15 DIAGNOSIS — E78.1 ESSENTIAL HYPERTRIGLYCERIDEMIA: Primary | ICD-10-CM

## 2019-11-15 DIAGNOSIS — Z82.49 FAMILY HISTORY OF CARDIOMYOPATHY: ICD-10-CM

## 2019-11-15 DIAGNOSIS — Z82.49 FAMILY HISTORY OF MYOCARDIAL INFARCTION: ICD-10-CM

## 2019-11-15 DIAGNOSIS — I10 BENIGN ESSENTIAL HYPERTENSION: ICD-10-CM

## 2019-11-15 PROCEDURE — 99214 OFFICE O/P EST MOD 30 MIN: CPT | Performed by: INTERNAL MEDICINE

## 2019-11-15 NOTE — PROGRESS NOTES
[facility]  Office followup  Tino Spencer 48 y o  male MRN: [id number]        Problems    1  Essential hypertriglyceridemia     2  Benign essential hypertension     3  Family history of cardiomyopathy     4  Family history of myocardial infarction         Impression:    Mumtaz Castillo returns to see me for primary prevention of CAD/MI with risk factors as below  Hypertension  · Blood pressure is excellent, only on 2 5 mg of lisinopril  · Diastolic blood pressures for a year were elevated ranging 84-90  · Likely driven by work stress etcetera  · I have advised continuation of lisinopril for now, but he okay to trial off of it if he would like to, as long as frequent blood pressures are taken for monitoring, recommended least every month if off of it  Mixed hyperlipidemia  · Restarted Crestor last visit with Lovaza for severely elevated TG   · , LDL 62  Excellent results  · Discussed some additional dietary modifications, at this point trying to add a little bit more plant based meals would probably help lower the triglycerides  · Additionally a little more exercise lies would likely lower the triglycerides  · He is thinking about starting Enrigue Pulley, with the risk of increased lipids  He is thinking about it, currently his hair growth seems to have improved, he will let me know if he decides to started, we will have closer observation of his lipids  Family history of premature CAD/MI  · Treadmill stress test 11/18 was completely normal  · He offers no new cardiac symptoms    Plan    No change to current regimen  Discussed diet and exercise alterations  Will let me know about Enrigue Rosio and need for closer follow-up of his cholesterol    Six-month follow-up recommended      HPI: Tino Spencer is a 48y o  year old male with a significant family history of premature coronary artery disease/MI, dad requiring transplant for severe ischemic cardiomyopathy, patient with a personal history of probably stress-induced hypertension, elevated diastolic pressures through most 2018 in early 2019, significantly improved on very low-dose lisinopril 2 5 mg daily  He has mixed hyperlipidemia, last set of lipids reveal severely elevated triglycerides well over 400,  at that time, total cholesterol 229  Maximum dose Lovaza, moderate dose Crestor, well tolerated currently, CK normal, some leg pain symptoms, but with statin holidays this is not resolved, so unlikely statin induced, and significant improvement in his cholesterol with LDL down to 62, triglycerides 231  He is exercising possibly 1 day a week, still eating a lot of animal products in the form of chicken, although avoids cheese, other saturated fats, and is doing a a pretty good job with his dietary modification  His job, his work stress does limit some of his ability to make better choices  He recently started having alopecia of the back of the scalp, underwent some shots to the scalp, was prescribed Nichelle Reagin which would raise cholesterol, has not taken it yet, has noted some improvement in his hair growth  Review of Systems   Constitutional: Negative for appetite change, diaphoresis, fatigue and fever  Respiratory: Negative for chest tightness, shortness of breath and wheezing  Cardiovascular: Negative for chest pain, palpitations and leg swelling  Gastrointestinal: Negative for abdominal pain and blood in stool  Musculoskeletal: Negative for arthralgias and joint swelling  Skin: Negative for rash  Neurological: Negative for dizziness, syncope and light-headedness           Past Medical History:   Diagnosis Date    Chronic sinusitis     LAST ASSESSED: [id number]    Closed fracture of lower jaw bone (HCC)     Hyperlipidemia     Seasonal allergies     Sleep apnea     Doesn't require cpap post UPP surgery     Past Surgical History:   Procedure Laterality Date    MANDIBLE FRACTURE SURGERY      as a child    [place] COLONOSCOPY FLX DX W/COLLJ SPEC WHEN PFRMD N/A 2/20/2017    Procedure: COLONOSCOPY;  Surgeon: Ortiz Rubio MD;  [facility]; Service: Gastroenterology    SUE BRANTLEY/[facility]  8/31/2017    Procedure: RIGHT DISTAL BICEPS TENDON REPAIR;  Surgeon: Jaden Donaldson MD;  [facility];  Service: Orthopedics    SINUS SURGERY      TONSILLECTOMY      TONSILLECTOMY AND ADENOIDECTOMY      UVULOPALATOPHARYNGOPLASTY      WISDOM TOOTH EXTRACTION       Social History     Substance and Sexual Activity   Alcohol Use Yes    Comment: social      Social History     Substance and Sexual Activity   Drug Use No     Social History     Tobacco Use   Smoking Status Never Smoker   Smokeless Tobacco Never Used     Family History   Problem Relation Age of Onset    Cancer Maternal Grandfather     Diabetes Mother     Hypertension Mother     Heart attack Father 48        ACUTE MI    Cardiomyopathy Father     Hypertension Brother     Stroke Paternal Grandfather         SYNDROME    Anuerysm Neg Hx     Clotting disorder Neg Hx     Heart failure Neg Hx     Coronary artery disease Neg Hx     Sudden death Neg Hx         scd       Allergies:   Allergies   Allergen Reactions    Dust Mite Extract Sneezing     --sinus infection     Other Other (See Comments)     Annotation - [id number]: Hayfever-sinus infection        Medications:     Current Outpatient Medications:     Cholecalciferol (VITAMIN D3) 1000 units CAPS, Take by mouth, Disp: , Rfl:     co-enzyme Q-10 100 mg capsule, Take by mouth, Disp: , Rfl:     fluticasone (FLONASE) 50 mcg/act nasal spray, 2 sprays into each nostril daily, Disp: 1 Bottle, Rfl: 5    levocetirizine (XYZAL) 5 MG tablet, TAKE 1 TABLET EVERY EVENING, Disp: 30 tablet, Rfl: 5    levothyroxine 137 mcg tablet, TAKE 1 TABLET BY MOUTH EVERY DAY, Disp: 30 tablet, Rfl: 5    lisinopril (ZESTRIL) 5 mg tablet, Take 1 tablet (5 mg total) by mouth daily, Disp: 30 tablet, Rfl: 11    Magnesium 100 MG CAPS, Take by mouth daily, Disp: , Rfl:     Multiple Vitamin (DAILY VALUE MULTIVITAMIN) TABS, Take by mouth, Disp: , Rfl:     omega-3-acid ethyl esters (LOVAZA) 1 g capsule, Take 2 capsules (2 g total) by mouth 2 (two) times a day, Disp: 120 capsule, Rfl: 11    Probiotic Product (PROBIOTIC PO), Take by mouth, Disp: , Rfl:     rosuvastatin (CRESTOR) 20 MG tablet, Take 1 tablet (20 mg total) by mouth daily, Disp: 30 tablet, Rfl: 11    XELJANZ 5 MG TABS, , Disp: , Rfl:       There were no vitals filed for this visit  Weight (last 2 days)     Date/Time   Weight    11/15/19 0804   94 3 (208)            Physical Exam   Constitutional: He is oriented to person, place, and time  No distress  HENT:   Head: Normocephalic and atraumatic  Eyes: Conjunctivae are normal  No scleral icterus  Neck: Normal range of motion  No JVD present  Cardiovascular: Normal rate, regular rhythm, normal heart sounds and intact distal pulses  No murmur heard  Pulmonary/Chest: Effort normal and breath sounds normal  No respiratory distress  He has no decreased breath sounds  He has no wheezes  He has no rhonchi  He has no rales  Musculoskeletal: He exhibits no edema  Right lower leg: Normal  He exhibits no edema  Left lower leg: Normal  He exhibits no edema  Neurological: He is alert and oriented to person, place, and time  Skin: Skin is warm and dry  He is not diaphoretic           Laboratory Studies:  Lab Results   Component Value Date     11/24/2017     05/05/2017     12/16/2016    K 4 8 11/04/2019    K 4 6 06/01/2018    K 4 4 11/24/2017    K 4 5 05/05/2017    K 4 5 12/16/2016     11/04/2019     06/01/2018    CL 99 11/24/2017     05/05/2017    CL 98 12/16/2016    CO2 24 11/04/2019    CO2 23 06/01/2018    CO2 26 05/05/2017    CO2 26 12/16/2016    CO2 24 05/27/2016    GLUCOSE 101 (H) 11/24/2017    GLUCOSE 101 (H) 05/05/2017    GLUCOSE 94 12/16/2016    CREATININE 1 24 11/04/2019 CREATININE 1 06 06/01/2018    CREATININE 1 10 11/24/2017    CREATININE 1 21 05/05/2017    CREATININE 1 25 12/16/2016    BUN 18 11/04/2019    BUN 21 06/01/2018    BUN 17 11/24/2017    BUN 18 05/05/2017    BUN 22 12/16/2016     Lab Results   Component Value Date    WBC 7 1 11/04/2019    WBC 5 73 06/27/2014    RBC 4 53 11/04/2019    RBC 4 72 06/27/2014    HGB 13 5 11/04/2019    HGB 14 0 06/27/2014    HCT 39 6 11/04/2019    HCT 40 9 06/27/2014    MCV 87 11/04/2019    MCV 87 06/27/2014    MCH 29 8 11/04/2019    MCH 29 7 06/27/2014    RDW 13 6 11/04/2019    RDW 13 1 06/27/2014     11/04/2019     06/27/2014     NT-proBNP: No results found for: NTBNP   CBC:  Lab Results   Component Value Date    WBC 7 1 11/04/2019    WBC 5 73 06/27/2014    RBC 4 53 11/04/2019    RBC 4 72 06/27/2014    HGB 13 5 11/04/2019    HGB 14 0 06/27/2014    HCT 39 6 11/04/2019    HCT 40 9 06/27/2014    MCV 87 11/04/2019    MCV 87 06/27/2014    MCH 29 8 11/04/2019    MCH 29 7 06/27/2014    RDW 13 6 11/04/2019    RDW 13 1 06/27/2014     11/04/2019     06/27/2014     Coags:    Lipid Profile:   Lab Results   Component Value Date    CHOL 168 11/24/2017     Lab Results   Component Value Date    HDL 37 (L) 11/04/2019     Lab Results   Component Value Date    LDLCALC 99 11/24/2017     Lab Results   Component Value Date    TRIG 231 (H) 11/04/2019       Cardiac testing:     EKG reviewed personally:     Treadmill stress test 11/18 normal    Yahaira Peraza MD    Portions of the record may have been created with voice recognition software   Occasional wrong word or "sound a like" substitutions may have occurred due to the inherent limitations of voice recognition software   Read the chart carefully and recognize, using context, where substitutions have occurred

## 2019-11-24 DIAGNOSIS — J30.1 SEASONAL ALLERGIC RHINITIS DUE TO POLLEN: ICD-10-CM

## 2019-11-24 RX ORDER — LEVOCETIRIZINE DIHYDROCHLORIDE 5 MG/1
TABLET, FILM COATED ORAL
Qty: 30 TABLET | Refills: 5 | OUTPATIENT
Start: 2019-11-24

## 2019-11-24 NOTE — TELEPHONE ENCOUNTER
I only saw pt once 1 5 years ago, was seeing Prabhakar Donis and Dr Gemma Quinn previously - now appears to have new PCP  Thanks    Kaitlyn

## 2019-12-01 DIAGNOSIS — J30.1 SEASONAL ALLERGIC RHINITIS DUE TO POLLEN: ICD-10-CM

## 2019-12-01 RX ORDER — LEVOCETIRIZINE DIHYDROCHLORIDE 5 MG/1
TABLET, FILM COATED ORAL
Qty: 30 TABLET | Refills: 5 | Status: SHIPPED | OUTPATIENT
Start: 2019-12-01 | End: 2020-06-08

## 2020-02-21 DIAGNOSIS — E03.8 OTHER SPECIFIED HYPOTHYROIDISM: ICD-10-CM

## 2020-02-21 RX ORDER — LEVOTHYROXINE SODIUM 137 UG/1
TABLET ORAL
Qty: 30 TABLET | Refills: 5 | Status: SHIPPED | OUTPATIENT
Start: 2020-02-21 | End: 2020-08-28

## 2020-03-24 ENCOUNTER — TRANSCRIBE ORDERS (OUTPATIENT)
Dept: ADMINISTRATIVE | Facility: HOSPITAL | Age: 55
End: 2020-03-24

## 2020-03-24 DIAGNOSIS — N50.819 ORCHIALGIA: Primary | ICD-10-CM

## 2020-04-01 ENCOUNTER — HOSPITAL ENCOUNTER (OUTPATIENT)
Dept: ULTRASOUND IMAGING | Facility: HOSPITAL | Age: 55
Discharge: HOME | End: 2020-04-01
Attending: UROLOGY
Payer: COMMERCIAL

## 2020-04-01 DIAGNOSIS — N50.819 ORCHIALGIA: ICD-10-CM

## 2020-04-01 PROCEDURE — 76870 US EXAM SCROTUM: CPT

## 2020-05-04 DIAGNOSIS — E78.1 ESSENTIAL HYPERTRIGLYCERIDEMIA: Primary | ICD-10-CM

## 2020-05-13 DIAGNOSIS — J30.1 SEASONAL ALLERGIC RHINITIS DUE TO POLLEN: ICD-10-CM

## 2020-05-13 RX ORDER — LEVOCETIRIZINE DIHYDROCHLORIDE 5 MG/1
TABLET, FILM COATED ORAL
Qty: 30 TABLET | Refills: 5 | OUTPATIENT
Start: 2020-05-13

## 2020-06-08 DIAGNOSIS — J30.1 SEASONAL ALLERGIC RHINITIS DUE TO POLLEN: ICD-10-CM

## 2020-06-08 RX ORDER — LEVOCETIRIZINE DIHYDROCHLORIDE 5 MG/1
TABLET, FILM COATED ORAL
Qty: 30 TABLET | Refills: 5 | Status: SHIPPED | OUTPATIENT
Start: 2020-06-08 | End: 2020-12-18

## 2020-06-16 DIAGNOSIS — E78.1 ESSENTIAL HYPERTRIGLYCERIDEMIA: ICD-10-CM

## 2020-06-16 RX ORDER — ROSUVASTATIN CALCIUM 20 MG/1
20 TABLET, COATED ORAL DAILY
Qty: 90 TABLET | Refills: 1 | Status: SHIPPED | OUTPATIENT
Start: 2020-06-16 | End: 2020-12-15

## 2020-08-14 ENCOUNTER — OFFICE VISIT (OUTPATIENT)
Dept: CARDIOLOGY CLINIC | Facility: CLINIC | Age: 55
End: 2020-08-14
Payer: COMMERCIAL

## 2020-08-14 VITALS
DIASTOLIC BLOOD PRESSURE: 80 MMHG | HEART RATE: 65 BPM | SYSTOLIC BLOOD PRESSURE: 110 MMHG | OXYGEN SATURATION: 98 % | HEIGHT: 69 IN | WEIGHT: 215.4 LBS | TEMPERATURE: 96.8 F | BODY MASS INDEX: 31.9 KG/M2

## 2020-08-14 DIAGNOSIS — Z82.49 FAMILY HISTORY OF CARDIOMYOPATHY: ICD-10-CM

## 2020-08-14 DIAGNOSIS — Z82.49 FAMILY HISTORY OF MYOCARDIAL INFARCTION: ICD-10-CM

## 2020-08-14 DIAGNOSIS — I10 BENIGN ESSENTIAL HYPERTENSION: ICD-10-CM

## 2020-08-14 DIAGNOSIS — E78.1 ESSENTIAL HYPERTRIGLYCERIDEMIA: Primary | ICD-10-CM

## 2020-08-14 PROCEDURE — 3008F BODY MASS INDEX DOCD: CPT | Performed by: INTERNAL MEDICINE

## 2020-08-14 PROCEDURE — 1036F TOBACCO NON-USER: CPT | Performed by: INTERNAL MEDICINE

## 2020-08-14 PROCEDURE — 3079F DIAST BP 80-89 MM HG: CPT | Performed by: INTERNAL MEDICINE

## 2020-08-14 PROCEDURE — 3074F SYST BP LT 130 MM HG: CPT | Performed by: INTERNAL MEDICINE

## 2020-08-14 PROCEDURE — 99214 OFFICE O/P EST MOD 30 MIN: CPT | Performed by: INTERNAL MEDICINE

## 2020-08-14 NOTE — PROGRESS NOTES
[facility]  Office followup  Vicky Lin 50 y o  male MRN: [id number]        Problems    1  Essential hypertriglyceridemia     2  Benign essential hypertension     3  Family history of cardiomyopathy     4  Family history of myocardial infarction         Impression:    Fely Diaz returns to see me for primary prevention of CAD/MI with risk factors as below  Hypertension  · Blood pressure is very well controlled    Mixed hyperlipidemia  · Currently taking coenzyme Q10, Lovaza, rosuvastatin  · Lipids have not been reassessed this year, total 145, triglycerides 231, HDL 37, LDL 62 at last visit  · Continue to  on healthy lifestyle choices, increased exercise, more plant based eating  ·   Family history of premature CAD/MI  · Normal treadmill stress test 11/18  · He tells me he does not do any routine exercise  · Interested in continued CAD screen    Plan    Treadmill stress test  Calcium score CT  Usual follow-up in 1 year      HPI: Vicky Lin is a 50y o  year old male with a significant family history of premature coronary artery disease/MI, dad requiring transplant for severe ischemic cardiomyopathy, and personally has hypertension, and mixed hyperlipidemia  He does not exercise, denies any current cardiac symptoms  He has tried to improved dietary intake of non saturated fat, and non animal fat foods, he continues on rosuvastatin, Lovaza, coenzyme Q10  He has had statin holidays in the past, have not relieved any of his peripheral arthralgia discomfort  He denies intolerance to medications currently, he denies noncompliance  His blood pressure is under excellent control, and lipid panel reasonable at last assessment with LDL 62, HDL 37, triglycerides 231  He denies any current cardiac symptoms  Review of Systems   Constitutional: Negative for appetite change, diaphoresis, fatigue and fever  Respiratory: Negative for chest tightness, shortness of breath and wheezing  Cardiovascular: Negative for chest pain, palpitations and leg swelling  Gastrointestinal: Negative for abdominal pain and blood in stool  Musculoskeletal: Negative for arthralgias and joint swelling  Skin: Negative for rash  Neurological: Negative for dizziness, syncope and light-headedness  Past Medical History:   Diagnosis Date    Chronic sinusitis     LAST ASSESSED: [id number]    Closed fracture of lower jaw bone (HCC)     Hyperlipidemia     Seasonal allergies     Sleep apnea     Doesn't require cpap post UPP surgery     Past Surgical History:   Procedure Laterality Date    MANDIBLE FRACTURE SURGERY      as a child    [place] COLONOSCOPY FLX DX W/COLLJ SPEC WHEN PFRMD N/A 2/20/2017    Procedure: COLONOSCOPY;  Surgeon: Tomasz Jacobson MD;  [facility]; Service: Gastroenterology    [facility]    Procedure: RIGHT DISTAL BICEPS TENDON REPAIR;  Surgeon: Kobe Murcia MD;  [facility];  Service: Orthopedics    SINUS SURGERY      TONSILLECTOMY      TONSILLECTOMY AND ADENOIDECTOMY      UVULOPALATOPHARYNGOPLASTY      WISDOM TOOTH EXTRACTION       Social History     Substance and Sexual Activity   Alcohol Use Yes    Comment: social      Social History     Substance and Sexual Activity   Drug Use No     Social History     Tobacco Use   Smoking Status Never Smoker   Smokeless Tobacco Never Used     Family History   Problem Relation Age of Onset    Cancer Maternal Grandfather     Diabetes Mother     Hypertension Mother     Heart attack Father 48        ACUTE MI    Cardiomyopathy Father     Hypertension Brother     Stroke Paternal Grandfather         SYNDROME    Anuerysm Neg Hx     Clotting disorder Neg Hx     Heart failure Neg Hx     Coronary artery disease Neg Hx     Sudden death Neg Hx         scd       Allergies:   Allergies   Allergen Reactions    Dust Mite Extract Sneezing     --sinus infection     Other Other (See Comments) Annotation - [id number]: Hayfever-sinus infection        Medications:     Current Outpatient Medications:     Cholecalciferol (VITAMIN D3) 1000 units CAPS, Take by mouth, Disp: , Rfl:     co-enzyme Q-10 100 mg capsule, Take by mouth, Disp: , Rfl:     fluticasone (FLONASE) 50 mcg/act nasal spray, 2 sprays into each nostril daily (Patient taking differently: 2 sprays into each nostril 2 (two) times a day as needed ), Disp: 1 Bottle, Rfl: 5    levocetirizine (XYZAL) 5 MG tablet, TAKE 1 TABLET BY MOUTH EVERY DAY IN THE EVENING, Disp: 30 tablet, Rfl: 5    levothyroxine 137 mcg tablet, TAKE 1 TABLET BY MOUTH EVERY DAY, Disp: 30 tablet, Rfl: 5    lisinopril (ZESTRIL) 5 mg tablet, Take 1 tablet (5 mg total) by mouth daily, Disp: 30 tablet, Rfl: 11    Magnesium 100 MG CAPS, Take by mouth daily, Disp: , Rfl:     Multiple Vitamin (DAILY VALUE MULTIVITAMIN) TABS, Take by mouth, Disp: , Rfl:     omega-3-acid ethyl esters (LOVAZA) 1 g capsule, Take 2 capsules (2 g total) by mouth 2 (two) times a day, Disp: 120 capsule, Rfl: 11    Probiotic Product (PROBIOTIC PO), Take by mouth, Disp: , Rfl:     rosuvastatin (CRESTOR) 20 MG tablet, Take 1 tablet (20 mg total) by mouth daily, Disp: 90 tablet, Rfl: 1    XELJANZ 5 MG TABS, , Disp: , Rfl:       There were no vitals filed for this visit  Weight (last 2 days)     None        Physical Exam  Constitutional:       General: He is not in acute distress  Appearance: He is not diaphoretic  HENT:      Head: Normocephalic and atraumatic  Eyes:      General: No scleral icterus  Conjunctiva/sclera: Conjunctivae normal    Neck:      Musculoskeletal: Normal range of motion  Vascular: No JVD  Cardiovascular:      Rate and Rhythm: Normal rate and regular rhythm  Heart sounds: Normal heart sounds  No murmur  Pulmonary:      Effort: Pulmonary effort is normal  No respiratory distress  Breath sounds: Normal breath sounds   No decreased breath sounds, wheezing, rhonchi or rales  Musculoskeletal:      Right lower leg: Normal  No edema  Left lower leg: Normal  No edema  Skin:     General: Skin is warm and dry  Neurological:      Mental Status: He is alert and oriented to person, place, and time             Laboratory Studies:  Lab Results   Component Value Date     11/24/2017     05/05/2017     12/16/2016    K 4 8 11/04/2019    K 4 6 06/01/2018    K 4 4 11/24/2017    K 4 5 05/05/2017    K 4 5 12/16/2016     11/04/2019     06/01/2018    CL 99 11/24/2017     05/05/2017    CL 98 12/16/2016    CO2 24 11/04/2019    CO2 23 06/01/2018    CO2 26 05/05/2017    CO2 26 12/16/2016    CO2 24 05/27/2016    GLUCOSE 101 (H) 11/24/2017    GLUCOSE 101 (H) 05/05/2017    GLUCOSE 94 12/16/2016    CREATININE 1 24 11/04/2019    CREATININE 1 06 06/01/2018    CREATININE 1 10 11/24/2017    CREATININE 1 21 05/05/2017    CREATININE 1 25 12/16/2016    BUN 18 11/04/2019    BUN 21 06/01/2018    BUN 17 11/24/2017    BUN 18 05/05/2017    BUN 22 12/16/2016     Lab Results   Component Value Date    WBC 7 1 11/04/2019    WBC 5 73 06/27/2014    RBC 4 53 11/04/2019    RBC 4 72 06/27/2014    HGB 13 5 11/04/2019    HGB 14 0 06/27/2014    HCT 39 6 11/04/2019    HCT 40 9 06/27/2014    MCV 87 11/04/2019    MCV 87 06/27/2014    MCH 29 8 11/04/2019    MCH 29 7 06/27/2014    RDW 13 6 11/04/2019    RDW 13 1 06/27/2014     11/04/2019     06/27/2014     NT-proBNP: No results found for: NTBNP   CBC:  Lab Results   Component Value Date    WBC 7 1 11/04/2019    WBC 5 73 06/27/2014    RBC 4 53 11/04/2019    RBC 4 72 06/27/2014    HGB 13 5 11/04/2019    HGB 14 0 06/27/2014    HCT 39 6 11/04/2019    HCT 40 9 06/27/2014    MCV 87 11/04/2019    MCV 87 06/27/2014    MCH 29 8 11/04/2019    MCH 29 7 06/27/2014    RDW 13 6 11/04/2019    RDW 13 1 06/27/2014     11/04/2019     06/27/2014     Coags:    Lipid Profile:   Lab Results   Component Value Date    CHOL 168 11/24/2017     Lab Results   Component Value Date    HDL 37 (L) 11/04/2019     Lab Results   Component Value Date    LDLCALC 62 11/04/2019     Lab Results   Component Value Date    TRIG 231 (H) 11/04/2019       Cardiac testing:     EKG reviewed personally:     Treadmill stress test 11/18 normal    Rudi Olmedo MD    Portions of the record may have been created with voice recognition software   Occasional wrong word or "sound a like" substitutions may have occurred due to the inherent limitations of voice recognition software   Read the chart carefully and recognize, using context, where substitutions have occurred

## 2020-08-28 DIAGNOSIS — E03.8 OTHER SPECIFIED HYPOTHYROIDISM: ICD-10-CM

## 2020-08-28 RX ORDER — LEVOTHYROXINE SODIUM 137 UG/1
TABLET ORAL
Qty: 30 TABLET | Refills: 5 | Status: SHIPPED | OUTPATIENT
Start: 2020-08-28 | End: 2021-03-05 | Stop reason: SDUPTHER

## 2020-09-28 ENCOUNTER — HOSPITAL ENCOUNTER (OUTPATIENT)
Dept: CT IMAGING | Facility: HOSPITAL | Age: 55
Discharge: HOME | End: 2020-09-28
Attending: INTERNAL MEDICINE
Payer: COMMERCIAL

## 2020-09-28 ENCOUNTER — HOSPITAL ENCOUNTER (OUTPATIENT)
Dept: NON INVASIVE DIAGNOSTICS | Facility: HOSPITAL | Age: 55
Discharge: HOME | End: 2020-09-28
Attending: INTERNAL MEDICINE
Payer: COMMERCIAL

## 2020-09-28 DIAGNOSIS — E78.1 ESSENTIAL HYPERTRIGLYCERIDEMIA: ICD-10-CM

## 2020-09-28 DIAGNOSIS — Z82.49 FAMILY HISTORY OF CARDIOMYOPATHY: ICD-10-CM

## 2020-09-28 LAB
CHEST PAIN STATEMENT: NORMAL
MAX DIASTOLIC BP: 88 MMHG
MAX HEART RATE: 157 BPM
MAX PREDICTED HEART RATE: 167 BPM
MAX. SYSTOLIC BP: 170 MMHG
PROTOCOL NAME: NORMAL
REASON FOR TERMINATION: NORMAL
TARGET HR FORMULA: NORMAL
TEST INDICATION: NORMAL
TIME IN EXERCISE PHASE: NORMAL

## 2020-09-28 PROCEDURE — 75571 CT HRT W/O DYE W/CA TEST: CPT

## 2020-09-28 PROCEDURE — 93016 CV STRESS TEST SUPVJ ONLY: CPT | Performed by: INTERNAL MEDICINE

## 2020-09-28 PROCEDURE — 93018 CV STRESS TEST I&R ONLY: CPT | Performed by: INTERNAL MEDICINE

## 2020-09-28 PROCEDURE — G1004 CDSM NDSC: HCPCS

## 2020-09-28 PROCEDURE — 93017 CV STRESS TEST TRACING ONLY: CPT

## 2020-10-01 DIAGNOSIS — I10 BENIGN ESSENTIAL HYPERTENSION: ICD-10-CM

## 2020-10-02 RX ORDER — LISINOPRIL 5 MG/1
5 TABLET ORAL DAILY
Qty: 30 TABLET | Refills: 11 | Status: SHIPPED | OUTPATIENT
Start: 2020-10-02 | End: 2021-03-05 | Stop reason: SDUPTHER

## 2020-10-06 LAB
CHOLEST SERPL-MCNC: 152 MG/DL (ref 100–199)
CK MB SERPL-MCNC: 3.3 NG/ML (ref 0–10.4)
HDLC SERPL-MCNC: 37 MG/DL
LDLC SERPL CALC-MCNC: 87 MG/DL (ref 0–99)
SL AMB VLDL CHOLESTEROL CALC: 28 MG/DL (ref 5–40)
TRIGL SERPL-MCNC: 161 MG/DL (ref 0–149)

## 2020-10-07 LAB
ALBUMIN SERPL-MCNC: 4.6 G/DL (ref 3.8–4.9)
ALBUMIN/GLOB SERPL: 2.3 {RATIO} (ref 1.2–2.2)
ALP SERPL-CCNC: 64 IU/L (ref 39–117)
ALT SERPL-CCNC: 33 IU/L (ref 0–44)
AST SERPL-CCNC: 29 IU/L (ref 0–40)
BASOPHILS # BLD AUTO: 0.1 X10E3/UL (ref 0–0.2)
BASOPHILS NFR BLD AUTO: 1 %
BILIRUB SERPL-MCNC: 0.6 MG/DL (ref 0–1.2)
BUN SERPL-MCNC: 25 MG/DL (ref 6–24)
BUN/CREAT SERPL: 22 (ref 9–20)
CALCIUM SERPL-MCNC: 9.1 MG/DL (ref 8.7–10.2)
CHLORIDE SERPL-SCNC: 104 MMOL/L (ref 96–106)
CO2 SERPL-SCNC: 25 MMOL/L (ref 20–29)
CREAT SERPL-MCNC: 1.13 MG/DL (ref 0.76–1.27)
EOSINOPHIL # BLD AUTO: 0.3 X10E3/UL (ref 0–0.4)
EOSINOPHIL NFR BLD AUTO: 5 %
ERYTHROCYTE [DISTWIDTH] IN BLOOD BY AUTOMATED COUNT: 12.8 % (ref 11.6–15.4)
GLOBULIN SER-MCNC: 2 G/DL (ref 1.5–4.5)
GLUCOSE SERPL-MCNC: 101 MG/DL (ref 65–99)
HCT VFR BLD AUTO: 41.6 % (ref 37.5–51)
HGB BLD-MCNC: 13.9 G/DL (ref 13–17.7)
IMM GRANULOCYTES # BLD: 0 X10E3/UL (ref 0–0.1)
IMM GRANULOCYTES NFR BLD: 0 %
LYMPHOCYTES # BLD AUTO: 2.6 X10E3/UL (ref 0.7–3.1)
LYMPHOCYTES NFR BLD AUTO: 40 %
MCH RBC QN AUTO: 30.3 PG (ref 26.6–33)
MCHC RBC AUTO-ENTMCNC: 33.4 G/DL (ref 31.5–35.7)
MCV RBC AUTO: 91 FL (ref 79–97)
MONOCYTES # BLD AUTO: 0.5 X10E3/UL (ref 0.1–0.9)
MONOCYTES NFR BLD AUTO: 9 %
NEUTROPHILS # BLD AUTO: 2.9 X10E3/UL (ref 1.4–7)
NEUTROPHILS NFR BLD AUTO: 45 %
PLATELET # BLD AUTO: 201 X10E3/UL (ref 150–450)
POTASSIUM SERPL-SCNC: 4.5 MMOL/L (ref 3.5–5.2)
PROT SERPL-MCNC: 6.6 G/DL (ref 6–8.5)
RBC # BLD AUTO: 4.59 X10E6/UL (ref 4.14–5.8)
SL AMB EGFR AFRICAN AMERICAN: 85 ML/MIN/1.73
SL AMB EGFR NON AFRICAN AMERICAN: 74 ML/MIN/1.73
SODIUM SERPL-SCNC: 143 MMOL/L (ref 134–144)
T4 FREE SERPL-MCNC: 1.3 NG/DL (ref 0.82–1.77)
TSH SERPL DL<=0.005 MIU/L-ACNC: 1.76 UIU/ML (ref 0.45–4.5)
WBC # BLD AUTO: 6.4 X10E3/UL (ref 3.4–10.8)

## 2020-12-15 DIAGNOSIS — E78.1 ESSENTIAL HYPERTRIGLYCERIDEMIA: ICD-10-CM

## 2020-12-15 RX ORDER — ROSUVASTATIN CALCIUM 20 MG/1
TABLET, COATED ORAL
Qty: 30 TABLET | Refills: 5 | Status: SHIPPED | OUTPATIENT
Start: 2020-12-15 | End: 2021-03-05 | Stop reason: SDUPTHER

## 2020-12-17 DIAGNOSIS — J30.1 SEASONAL ALLERGIC RHINITIS DUE TO POLLEN: ICD-10-CM

## 2020-12-18 RX ORDER — LEVOCETIRIZINE DIHYDROCHLORIDE 5 MG/1
TABLET, FILM COATED ORAL
Qty: 30 TABLET | Refills: 5 | Status: SHIPPED | OUTPATIENT
Start: 2020-12-18 | End: 2021-06-23

## 2021-02-21 DIAGNOSIS — E03.8 OTHER SPECIFIED HYPOTHYROIDISM: ICD-10-CM

## 2021-02-21 RX ORDER — LEVOTHYROXINE SODIUM 137 UG/1
TABLET ORAL
Qty: 30 TABLET | Refills: 5 | OUTPATIENT
Start: 2021-02-21

## 2021-02-28 DIAGNOSIS — E03.8 OTHER SPECIFIED HYPOTHYROIDISM: ICD-10-CM

## 2021-02-28 RX ORDER — LEVOTHYROXINE SODIUM 137 UG/1
TABLET ORAL
Qty: 30 TABLET | Refills: 5 | OUTPATIENT
Start: 2021-02-28

## 2021-03-01 LAB — HBA1C MFR BLD HPLC: 5.7 %

## 2021-03-02 DIAGNOSIS — E03.8 OTHER SPECIFIED HYPOTHYROIDISM: ICD-10-CM

## 2021-03-02 RX ORDER — LEVOTHYROXINE SODIUM 137 UG/1
TABLET ORAL
Qty: 30 TABLET | Refills: 5 | OUTPATIENT
Start: 2021-03-02

## 2021-03-02 NOTE — TELEPHONE ENCOUNTER
I have responded several times to this  He is no longer under my care  Please let the pharmacy know  Thanks    Dr Caesar Cruz

## 2021-03-05 ENCOUNTER — OFFICE VISIT (OUTPATIENT)
Dept: FAMILY MEDICINE CLINIC | Facility: CLINIC | Age: 56
End: 2021-03-05
Payer: COMMERCIAL

## 2021-03-05 VITALS
WEIGHT: 218 LBS | HEART RATE: 100 BPM | OXYGEN SATURATION: 97 % | RESPIRATION RATE: 16 BRPM | SYSTOLIC BLOOD PRESSURE: 117 MMHG | TEMPERATURE: 97.6 F | DIASTOLIC BLOOD PRESSURE: 73 MMHG | HEIGHT: 69 IN | BODY MASS INDEX: 32.29 KG/M2

## 2021-03-05 DIAGNOSIS — E78.1 ESSENTIAL HYPERTRIGLYCERIDEMIA: ICD-10-CM

## 2021-03-05 DIAGNOSIS — I10 BENIGN ESSENTIAL HYPERTENSION: ICD-10-CM

## 2021-03-05 DIAGNOSIS — E03.8 OTHER SPECIFIED HYPOTHYROIDISM: ICD-10-CM

## 2021-03-05 PROCEDURE — 3074F SYST BP LT 130 MM HG: CPT | Performed by: INTERNAL MEDICINE

## 2021-03-05 PROCEDURE — 1036F TOBACCO NON-USER: CPT | Performed by: INTERNAL MEDICINE

## 2021-03-05 PROCEDURE — 99214 OFFICE O/P EST MOD 30 MIN: CPT | Performed by: INTERNAL MEDICINE

## 2021-03-05 PROCEDURE — 3725F SCREEN DEPRESSION PERFORMED: CPT | Performed by: INTERNAL MEDICINE

## 2021-03-05 PROCEDURE — 3078F DIAST BP <80 MM HG: CPT | Performed by: INTERNAL MEDICINE

## 2021-03-05 PROCEDURE — 3008F BODY MASS INDEX DOCD: CPT | Performed by: INTERNAL MEDICINE

## 2021-03-05 RX ORDER — LISINOPRIL 5 MG/1
5 TABLET ORAL DAILY
Qty: 90 TABLET | Refills: 1 | Status: SHIPPED | OUTPATIENT
Start: 2021-03-05 | End: 2021-09-21 | Stop reason: SDUPTHER

## 2021-03-05 RX ORDER — LEVOTHYROXINE SODIUM 137 UG/1
137 TABLET ORAL DAILY
Qty: 90 TABLET | Refills: 1 | Status: SHIPPED | OUTPATIENT
Start: 2021-03-05 | End: 2021-08-16

## 2021-03-05 RX ORDER — OMEGA-3-ACID ETHYL ESTERS 1 G/1
2 CAPSULE, LIQUID FILLED ORAL 2 TIMES DAILY
Qty: 360 CAPSULE | Refills: 1 | Status: SHIPPED | OUTPATIENT
Start: 2021-03-05

## 2021-03-05 RX ORDER — ROSUVASTATIN CALCIUM 20 MG/1
20 TABLET, COATED ORAL DAILY
Qty: 90 TABLET | Refills: 1 | Status: SHIPPED | OUTPATIENT
Start: 2021-03-05 | End: 2021-09-21 | Stop reason: SDUPTHER

## 2021-03-05 NOTE — PROGRESS NOTES
BMI Counseling: Body mass index is 32 19 kg/m²  The BMI is above normal  Nutrition recommendations include decreasing portion sizes and limiting drinks that contain sugar  Exercise recommendations include exercising 3-5 times per week  No pharmacotherapy was ordered  Patient referred to PCP due to patient being overweight  Assessment/Plan:         Diagnoses and all orders for this visit:    Other specified hypothyroidism  -     levothyroxine 137 mcg tablet; Take 1 tablet (137 mcg total) by mouth daily    Benign essential hypertension  -     lisinopril (ZESTRIL) 5 mg tablet; Take 1 tablet (5 mg total) by mouth daily    Essential hypertriglyceridemia  -     omega-3-acid ethyl esters (LOVAZA) 1 g capsule; Take 2 capsules (2 g total) by mouth 2 (two) times a day  -     rosuvastatin (CRESTOR) 20 MG tablet; Take 1 tablet (20 mg total) by mouth daily          Subjective:      Patient ID: Jaelyn Sanchez is a 50 y o  male  Reviewed his lab of this week  Needs rx  The following portions of the patient's history were reviewed and updated as appropriate: He  has a past medical history of Chronic sinusitis, Closed fracture of lower jaw bone ([address] ), Hyperlipidemia, Seasonal allergies, and Sleep apnea  He   Patient Active Problem List    Diagnosis Date Noted    Chronic pain of left knee 02/08/2019    Patellar tendinitis of left knee 02/08/2019    Family history of cardiomyopathy 09/27/2018    Family history of myocardial infarction 09/27/2018    Essential hypertriglyceridemia 01/18/2016    Hypothyroidism 01/06/2015    Benign essential hypertension 08/13/2014    Psoriasis 08/13/2014    Vitamin D deficiency 06/30/2014     He  has a past surgical history that includes Uvulopalatopharyngoplasty; Mandible fracture surgery; Tonsillectomy; pr colonoscopy flx dx w/collj spec when pfrmd (N/A, 2/20/2017); pr reinsert bi/triceps tendon,distal (8/31/2017);  [place] tooth extraction; Sinus surgery; and Tonsillectomy and adenoidectomy  His family history includes Cancer in his maternal grandfather; Cardiomyopathy in his father; Diabetes in his mother; Heart attack (age of onset: 48) in his father; Hypertension in his brother and mother; Stroke in his paternal grandfather  He  reports that he has never smoked  He has never used smokeless tobacco  He reports current alcohol use  He reports that he does not use drugs  Current Outpatient Medications   Medication Sig Dispense Refill    Cholecalciferol (VITAMIN D3) 1000 units CAPS Take by mouth      co-enzyme Q-10 100 mg capsule Take by mouth      fluticasone (FLONASE) 50 mcg/act nasal spray 2 sprays into each nostril daily (Patient taking differently: 2 sprays into each nostril 2 (two) times a day as needed ) 1 Bottle 5    levocetirizine (XYZAL) 5 MG tablet TAKE 1 TABLET BY MOUTH EVERY DAY IN THE EVENING 30 tablet 5    levothyroxine 137 mcg tablet Take 1 tablet (137 mcg total) by mouth daily 90 tablet 1    lisinopril (ZESTRIL) 5 mg tablet Take 1 tablet (5 mg total) by mouth daily 90 tablet 1    Magnesium 100 MG CAPS Take by mouth daily      Multiple Vitamin (DAILY VALUE MULTIVITAMIN) TABS Take by mouth      omega-3-acid ethyl esters (LOVAZA) 1 g capsule Take 2 capsules (2 g total) by mouth 2 (two) times a day 360 capsule 1    Probiotic Product (PROBIOTIC PO) Take by mouth      rosuvastatin (CRESTOR) 20 MG tablet Take 1 tablet (20 mg total) by mouth daily 90 tablet 1    XELJANZ 5 MG TABS        No current facility-administered medications for this visit        Current Outpatient Medications on File Prior to Visit   Medication Sig    Cholecalciferol (VITAMIN D3) 1000 units CAPS Take by mouth    co-enzyme Q-10 100 mg capsule Take by mouth    fluticasone (FLONASE) 50 mcg/act nasal spray 2 sprays into each nostril daily (Patient taking differently: 2 sprays into each nostril 2 (two) times a day as needed )    levocetirizine (XYZAL) 5 MG tablet TAKE 1 TABLET BY MOUTH EVERY DAY IN THE EVENING    Magnesium 100 MG CAPS Take by mouth daily    Multiple Vitamin (DAILY VALUE MULTIVITAMIN) TABS Take by mouth    Probiotic Product (PROBIOTIC PO) Take by mouth    XELJANZ 5 MG TABS      No current facility-administered medications on file prior to visit  He is allergic to dust mite extract and other       Review of Systems   Constitutional: Negative  HENT: Negative  Respiratory: Negative  Cardiovascular: Negative  Gastrointestinal: Negative  Objective:      /73 (BP Location: Right arm, Patient Position: Sitting, Cuff Size: Large)   Pulse 100   Temp 97 6 °F (36 4 °C) (Temporal)   Resp 16   Ht 5' 9" (1 753 m)   Wt 98 9 kg (218 lb)   SpO2 97%   BMI 32 19 kg/m²          Physical Exam  Constitutional:       Appearance: Normal appearance  HENT:      Head: Normocephalic and atraumatic  Right Ear: Tympanic membrane normal       Left Ear: Tympanic membrane normal    Neck:      Musculoskeletal: Neck supple  Cardiovascular:      Rate and Rhythm: Normal rate and regular rhythm  Pulmonary:      Effort: Pulmonary effort is normal       Breath sounds: Normal breath sounds  Genitourinary:     Prostate: Normal       Rectum: Guaiac result negative  Neurological:      Mental Status: He is alert

## 2021-06-23 DIAGNOSIS — J30.1 SEASONAL ALLERGIC RHINITIS DUE TO POLLEN: ICD-10-CM

## 2021-06-23 RX ORDER — LEVOCETIRIZINE DIHYDROCHLORIDE 5 MG/1
TABLET, FILM COATED ORAL
Qty: 30 TABLET | Refills: 5 | Status: SHIPPED | OUTPATIENT
Start: 2021-06-23 | End: 2021-09-21 | Stop reason: SDUPTHER

## 2021-06-28 ENCOUNTER — TELEPHONE (OUTPATIENT)
Dept: CARDIOLOGY CLINIC | Facility: CLINIC | Age: 56
End: 2021-06-28

## 2021-06-28 DIAGNOSIS — I10 BENIGN ESSENTIAL HYPERTENSION: ICD-10-CM

## 2021-06-28 DIAGNOSIS — E78.1 ESSENTIAL HYPERTRIGLYCERIDEMIA: Primary | ICD-10-CM

## 2021-06-28 NOTE — TELEPHONE ENCOUNTER
P/C asking to complete BW prior to OV with Dr Divine Springer in September    Lipid panel and CMP ordered

## 2021-07-06 ENCOUNTER — TELEPHONE (OUTPATIENT)
Dept: CARDIOLOGY CLINIC | Facility: CLINIC | Age: 56
End: 2021-07-06

## 2021-07-08 LAB
ALBUMIN SERPL-MCNC: 4.6 G/DL (ref 3.8–4.9)
ALBUMIN/GLOB SERPL: 2.3 {RATIO} (ref 1.2–2.2)
ALP SERPL-CCNC: 60 IU/L (ref 48–121)
ALT SERPL-CCNC: 26 IU/L (ref 0–44)
AST SERPL-CCNC: 26 IU/L (ref 0–40)
BILIRUB SERPL-MCNC: 0.4 MG/DL (ref 0–1.2)
BUN SERPL-MCNC: 15 MG/DL (ref 6–24)
BUN/CREAT SERPL: 14 (ref 9–20)
CALCIUM SERPL-MCNC: 9.6 MG/DL (ref 8.7–10.2)
CHLORIDE SERPL-SCNC: 101 MMOL/L (ref 96–106)
CHOLEST SERPL-MCNC: 148 MG/DL (ref 100–199)
CO2 SERPL-SCNC: 27 MMOL/L (ref 20–29)
CREAT SERPL-MCNC: 1.07 MG/DL (ref 0.76–1.27)
GLOBULIN SER-MCNC: 2 G/DL (ref 1.5–4.5)
GLUCOSE SERPL-MCNC: 105 MG/DL (ref 65–99)
HDLC SERPL-MCNC: 36 MG/DL
LDLC SERPL CALC-MCNC: 82 MG/DL (ref 0–99)
POTASSIUM SERPL-SCNC: 4.4 MMOL/L (ref 3.5–5.2)
PROT SERPL-MCNC: 6.6 G/DL (ref 6–8.5)
SL AMB EGFR AFRICAN AMERICAN: 91 ML/MIN/1.73
SL AMB EGFR NON AFRICAN AMERICAN: 79 ML/MIN/1.73
SODIUM SERPL-SCNC: 141 MMOL/L (ref 134–144)
TRIGL SERPL-MCNC: 174 MG/DL (ref 0–149)

## 2021-08-14 DIAGNOSIS — E03.8 OTHER SPECIFIED HYPOTHYROIDISM: ICD-10-CM

## 2021-08-16 RX ORDER — LEVOTHYROXINE SODIUM 137 UG/1
137 TABLET ORAL DAILY
Qty: 30 TABLET | Refills: 1 | Status: SHIPPED | OUTPATIENT
Start: 2021-08-16 | End: 2021-09-21 | Stop reason: SDUPTHER

## 2021-09-21 ENCOUNTER — OFFICE VISIT (OUTPATIENT)
Dept: FAMILY MEDICINE CLINIC | Facility: CLINIC | Age: 56
End: 2021-09-21
Payer: COMMERCIAL

## 2021-09-21 VITALS
OXYGEN SATURATION: 97 % | RESPIRATION RATE: 16 BRPM | BODY MASS INDEX: 32.44 KG/M2 | SYSTOLIC BLOOD PRESSURE: 142 MMHG | HEIGHT: 69 IN | WEIGHT: 219 LBS | TEMPERATURE: 97.2 F | DIASTOLIC BLOOD PRESSURE: 98 MMHG | HEART RATE: 73 BPM

## 2021-09-21 DIAGNOSIS — E03.8 OTHER SPECIFIED HYPOTHYROIDISM: ICD-10-CM

## 2021-09-21 DIAGNOSIS — I10 BENIGN ESSENTIAL HYPERTENSION: Primary | ICD-10-CM

## 2021-09-21 DIAGNOSIS — K42.9 UMBILICAL HERNIA WITHOUT OBSTRUCTION AND WITHOUT GANGRENE: ICD-10-CM

## 2021-09-21 DIAGNOSIS — E78.1 ESSENTIAL HYPERTRIGLYCERIDEMIA: ICD-10-CM

## 2021-09-21 DIAGNOSIS — R73.03 PREDIABETES: ICD-10-CM

## 2021-09-21 DIAGNOSIS — J30.1 SEASONAL ALLERGIC RHINITIS DUE TO POLLEN: ICD-10-CM

## 2021-09-21 DIAGNOSIS — Z12.5 SCREENING FOR PROSTATE CANCER: ICD-10-CM

## 2021-09-21 PROCEDURE — 3077F SYST BP >= 140 MM HG: CPT | Performed by: INTERNAL MEDICINE

## 2021-09-21 PROCEDURE — 3725F SCREEN DEPRESSION PERFORMED: CPT | Performed by: INTERNAL MEDICINE

## 2021-09-21 PROCEDURE — 99214 OFFICE O/P EST MOD 30 MIN: CPT | Performed by: INTERNAL MEDICINE

## 2021-09-21 PROCEDURE — 3080F DIAST BP >= 90 MM HG: CPT | Performed by: INTERNAL MEDICINE

## 2021-09-21 RX ORDER — LEVOCETIRIZINE DIHYDROCHLORIDE 5 MG/1
5 TABLET, FILM COATED ORAL EVERY EVENING
Qty: 30 TABLET | Refills: 5 | Status: SHIPPED | OUTPATIENT
Start: 2021-09-21 | End: 2022-02-21

## 2021-09-21 RX ORDER — ROSUVASTATIN CALCIUM 20 MG/1
20 TABLET, COATED ORAL DAILY
Qty: 30 TABLET | Refills: 5 | Status: SHIPPED | OUTPATIENT
Start: 2021-09-21 | End: 2022-02-21

## 2021-09-21 RX ORDER — LEVOTHYROXINE SODIUM 137 UG/1
137 TABLET ORAL DAILY
Qty: 30 TABLET | Refills: 5 | Status: SHIPPED | OUTPATIENT
Start: 2021-09-21 | End: 2021-11-03 | Stop reason: SDUPTHER

## 2021-09-21 RX ORDER — LISINOPRIL 5 MG/1
5 TABLET ORAL DAILY
Qty: 30 TABLET | Refills: 5 | Status: SHIPPED | OUTPATIENT
Start: 2021-09-21 | End: 2021-09-30 | Stop reason: SDUPTHER

## 2021-09-21 NOTE — PROGRESS NOTES
Depression Screening and Follow-up Plan:   Patient was screened for depression during today's encounter  They screened negative with a PHQ-2 score of 0  Assessment/Plan:         Diagnoses and all orders for this visit:    Benign essential hypertension  Comments:  reorder med  Orders:  -     lisinopril (ZESTRIL) 5 mg tablet; Take 1 tablet (5 mg total) by mouth daily    Screening for prostate cancer    Seasonal allergic rhinitis due to pollen  -     levocetirizine (XYZAL) 5 MG tablet; Take 1 tablet (5 mg total) by mouth every evening    Other specified hypothyroidism  Comments:  reorder med  Orders:  -     levothyroxine 137 mcg tablet; Take 1 tablet (137 mcg total) by mouth daily  -     TSH, 3rd generation with Free T4 reflex; Future    Essential hypertriglyceridemia  Comments:  reorder med  Orders:  -     rosuvastatin (CRESTOR) 20 MG tablet; Take 1 tablet (20 mg total) by mouth daily    Prediabetes  Comments:  due for lab  Orders:  -     Hemoglobin A1C; Future    Other orders  -     Cancel: PSA, total and free; Future          Subjective:      Patient ID: Greer Gardner is a 49 y o  male  Pt had lab recent  Reviewed it with him  Card stopped ace however bp up a bit  Will restart  Ace  He also complains of rt elbow tendinits  Discussed use 7-10 days aleve with gi protection  Also wants me to look at his unbilicus  The following portions of the patient's history were reviewed and updated as appropriate: He  has a past medical history of Chronic sinusitis, Closed fracture of lower jaw bone ([address] ), Hyperlipidemia, Seasonal allergies, and Sleep apnea    He   Patient Active Problem List    Diagnosis Date Noted    Chronic pain of left knee 02/08/2019    Patellar tendinitis of left knee 02/08/2019    Family history of cardiomyopathy 09/27/2018    Family history of myocardial infarction 09/27/2018    Essential hypertriglyceridemia 01/18/2016    Hypothyroidism 01/06/2015    Benign essential hypertension 08/13/2014    Psoriasis 08/13/2014    Vitamin D deficiency 06/30/2014     He  has a past surgical history that includes Uvulopalatopharyngoplasty; Mandible fracture surgery; Tonsillectomy; pr colonoscopy flx dx w/collj spec when pfrmd (N/A, 2/20/2017); pr reinsert bi/triceps tendon,distal (8/31/2017); [place] tooth extraction; Sinus surgery; and Tonsillectomy and adenoidectomy  His family history includes Cancer in his maternal grandfather; Cardiomyopathy in his father; Diabetes in his mother; Heart attack (age of onset: 48) in his father; Hypertension in his brother and mother; Stroke in his paternal grandfather  He  reports that he has never smoked  He has never used smokeless tobacco  He reports current alcohol use  He reports that he does not use drugs  Current Outpatient Medications   Medication Sig Dispense Refill    Cholecalciferol (VITAMIN D3) 1000 units CAPS Take by mouth      co-enzyme Q-10 100 mg capsule Take by mouth      fluticasone (FLONASE) 50 mcg/act nasal spray 2 sprays into each nostril daily (Patient taking differently: 2 sprays into each nostril 2 (two) times a day as needed ) 1 Bottle 5    levocetirizine (XYZAL) 5 MG tablet Take 1 tablet (5 mg total) by mouth every evening 30 tablet 5    levothyroxine 137 mcg tablet Take 1 tablet (137 mcg total) by mouth daily 30 tablet 5    Magnesium 100 MG CAPS Take by mouth daily      Multiple Vitamin (DAILY VALUE MULTIVITAMIN) TABS Take by mouth      omega-3-acid ethyl esters (LOVAZA) 1 g capsule Take 2 capsules (2 g total) by mouth 2 (two) times a day 360 capsule 1    Probiotic Product (PROBIOTIC PO) Take by mouth      rosuvastatin (CRESTOR) 20 MG tablet Take 1 tablet (20 mg total) by mouth daily 30 tablet 5    lisinopril (ZESTRIL) 5 mg tablet Take 1 tablet (5 mg total) by mouth daily 30 tablet 5     No current facility-administered medications for this visit       Current Outpatient Medications on File Prior to Visit   Medication Sig    Cholecalciferol (VITAMIN D3) 1000 units CAPS Take by mouth    co-enzyme Q-10 100 mg capsule Take by mouth    fluticasone (FLONASE) 50 mcg/act nasal spray 2 sprays into each nostril daily (Patient taking differently: 2 sprays into each nostril 2 (two) times a day as needed )    Magnesium 100 MG CAPS Take by mouth daily    Multiple Vitamin (DAILY VALUE MULTIVITAMIN) TABS Take by mouth    omega-3-acid ethyl esters (LOVAZA) 1 g capsule Take 2 capsules (2 g total) by mouth 2 (two) times a day    Probiotic Product (PROBIOTIC PO) Take by mouth    [DISCONTINUED] levocetirizine (XYZAL) 5 MG tablet TAKE 1 TABLET BY MOUTH EVERY DAY IN THE EVENING    [DISCONTINUED] levothyroxine 137 mcg tablet TAKE 1 TABLET (137 MCG TOTAL) BY MOUTH DAILY    [DISCONTINUED] rosuvastatin (CRESTOR) 20 MG tablet Take 1 tablet (20 mg total) by mouth daily    [DISCONTINUED] lisinopril (ZESTRIL) 5 mg tablet Take 1 tablet (5 mg total) by mouth daily (Patient not taking: Reported on 9/21/2021)    [DISCONTINUED] XELJANZ 5 MG TABS  (Patient not taking: Reported on 9/21/2021)     No current facility-administered medications on file prior to visit  He is allergic to dust mite extract and other       Review of Systems   Constitutional: Negative  HENT: Negative  Respiratory: Negative  Cardiovascular: Negative  Neurological: Negative for headaches  Objective:      /98 (BP Location: Right arm, Patient Position: Sitting, Cuff Size: Large)   Pulse 73   Temp (!) 97 2 °F (36 2 °C) (Temporal)   Resp 16   Ht 5' 9" (1 753 m)   Wt 99 3 kg (219 lb)   SpO2 97%   BMI 32 34 kg/m²          Physical Exam  Constitutional:       Appearance: He is obese  HENT:      Head: Normocephalic and atraumatic  Right Ear: Tympanic membrane and ear canal normal       Left Ear: Tympanic membrane and ear canal normal    Pulmonary:      Effort: Pulmonary effort is normal       Breath sounds: Normal breath sounds     Abdominal: Comments: Umbilical hernia  Musculoskeletal:      Cervical back: Neck supple  Lymphadenopathy:      Cervical: No cervical adenopathy  Neurological:      Mental Status: He is alert

## 2021-09-30 ENCOUNTER — OFFICE VISIT (OUTPATIENT)
Dept: CARDIOLOGY CLINIC | Facility: CLINIC | Age: 56
End: 2021-09-30
Payer: COMMERCIAL

## 2021-09-30 VITALS
HEART RATE: 65 BPM | OXYGEN SATURATION: 98 % | BODY MASS INDEX: 32.79 KG/M2 | HEIGHT: 69 IN | SYSTOLIC BLOOD PRESSURE: 148 MMHG | WEIGHT: 221.4 LBS | DIASTOLIC BLOOD PRESSURE: 84 MMHG

## 2021-09-30 DIAGNOSIS — Z82.49 FAMILY HISTORY OF MYOCARDIAL INFARCTION: ICD-10-CM

## 2021-09-30 DIAGNOSIS — I10 BENIGN ESSENTIAL HYPERTENSION: ICD-10-CM

## 2021-09-30 DIAGNOSIS — Z82.49 FAMILY HISTORY OF CARDIOMYOPATHY: ICD-10-CM

## 2021-09-30 DIAGNOSIS — E78.1 ESSENTIAL HYPERTRIGLYCERIDEMIA: ICD-10-CM

## 2021-09-30 DIAGNOSIS — I10 BENIGN ESSENTIAL HYPERTENSION: Primary | ICD-10-CM

## 2021-09-30 PROCEDURE — 93000 ELECTROCARDIOGRAM COMPLETE: CPT | Performed by: INTERNAL MEDICINE

## 2021-09-30 PROCEDURE — 99214 OFFICE O/P EST MOD 30 MIN: CPT | Performed by: INTERNAL MEDICINE

## 2021-09-30 PROCEDURE — 1036F TOBACCO NON-USER: CPT | Performed by: INTERNAL MEDICINE

## 2021-09-30 PROCEDURE — 3008F BODY MASS INDEX DOCD: CPT | Performed by: INTERNAL MEDICINE

## 2021-09-30 RX ORDER — LISINOPRIL 5 MG/1
5 TABLET ORAL DAILY
Qty: 90 TABLET | Refills: 3 | Status: SHIPPED | OUTPATIENT
Start: 2021-09-30 | End: 2022-02-21

## 2021-09-30 NOTE — PROGRESS NOTES
[facility]  Office followup  Ean Cash 49 y o  male MRN: [id number]        Problems    1  Benign essential hypertension  POCT ECG   2  Essential hypertriglyceridemia     3  Family history of cardiomyopathy     4  Family history of myocardial infarction         Impression:    Gerson Lawson returns to see me for primary prevention of CAD/MI with risk factors as below      Hypertension  · Blood pressure has not been well controlled lately  · A number of factors including his perception of the world currently, issues with COVID-19, he just feels very stressed  · Previously was on lisinopril 5 mg, but stopped this due to normotension after lifestyle modification    Mixed hyperlipidemia  · On coenzyme Q10, Lovaza and rosuvastatin  · LDL is excellent  · Triglycerides marginally elevated  · Lifestyle has not been the greatest lately, he is up about 13 lb over 2 years    Family history of premature CAD/MI  · Normal stress test 11/18  · Normal stress test 2020  · 0 coronary calcium 2020  · He exercises without cardiac symptoms    Plan    Arielle Alvine exam remains normal  Omar EKG remains normal  His calcium score was 0 in 2020  LDL is well controlled on rosuvastatin  Triglycerides are marginally elevated  Weight is up 13 lb over the last 2 years  Blood pressure is uncontrolled likely due to lifestyle changes over the last couple years  I have advised only single portions for dinner  Strict avoidance of sodium containing foods  I would consider intermittent fasting, with skipping breakfast, as a way to decrease calories at least for 2-4 weeks to aid in more effective weight loss  I would recommend in the meantime resuming lisinopril 5 mg daily  Goal blood pressures less than 135/85, he has the ability to check at home    HPI: Ean Cash is a 49y o  year old male with a significant family history of premature coronary artery disease/MI, dad requiring transplant for severe ischemic cardiomyopathy, and personally has hypertension, and mixed hyperlipidemia  He is exercising  He still having his thigh discomfort issues which go away with using massaging machine  He said he has trialed off of statins in the past without benefit  He has never had a CPK drawn  Blood pressure has been elevated for the past 6 months, he feels very stressed about the state of the world currently  He is not vaccinated, and seems somewhat against vaccination for COVID-19, we discussed this briefly today  His EKG in the office was normal  His triglycerides are marginally elevated 178, but LDL controlled          Review of Systems   Constitutional: Negative for appetite change, diaphoresis, fatigue and fever  Respiratory: Negative for chest tightness, shortness of breath and wheezing  Cardiovascular: Negative for chest pain, palpitations and leg swelling  Gastrointestinal: Negative for abdominal pain and blood in stool  Musculoskeletal: Negative for arthralgias and joint swelling  Skin: Negative for rash  Neurological: Negative for dizziness, syncope and light-headedness  Past Medical History:   Diagnosis Date    Chronic sinusitis     LAST ASSESSED: [id number]    Closed fracture of lower jaw bone (HCC)     Hyperlipidemia     Seasonal allergies     Sleep apnea     Doesn't require cpap post UPP surgery     Past Surgical History:   Procedure Laterality Date    MANDIBLE FRACTURE SURGERY      as a child    [place] COLONOSCOPY FLX DX W/COLLJ SPEC WHEN PFRMD N/A 2/20/2017    Procedure: COLONOSCOPY;  Surgeon: Freda Uribe MD;  [facility];   Service: Gastroenterology    [place] TREY BRANTLEY/ALLYSSA JACQUELINE [facility]  8/31/2017    Procedure: RIGHT DISTAL BICEPS TENDON REPAIR;  Surgeon: Keo Kearney MD;  [facility];  Service: Orthopedics    SINUS SURGERY      TONSILLECTOMY      TONSILLECTOMY AND ADENOIDECTOMY      UVULOPALATOPHARYNGOPLASTY      WISDOM TOOTH EXTRACTION       Social History     Substance and Sexual Activity   Alcohol Use Yes    Comment: social      Social History     Substance and Sexual Activity   Drug Use No     Social History     Tobacco Use   Smoking Status Never Smoker   Smokeless Tobacco Never Used     Family History   Problem Relation Age of Onset    Cancer Maternal Grandfather     Diabetes Mother     Hypertension Mother     Heart attack Father 48        ACUTE MI    Cardiomyopathy Father     Hypertension Brother     Stroke Paternal Grandfather         SYNDROME    Anuerysm Neg Hx     Clotting disorder Neg Hx     Heart failure Neg Hx     Coronary artery disease Neg Hx     Sudden death Neg Hx         scd       Allergies:   Allergies   Allergen Reactions    Dust Mite Extract Sneezing     --sinus infection     Other Other (See Comments)     [facility] - [id number]: Hayfever-sinus infection        Medications:     Current Outpatient Medications:     Cholecalciferol (VITAMIN D3) 1000 units CAPS, Take by mouth, Disp: , Rfl:     co-enzyme Q-10 100 mg capsule, Take by mouth, Disp: , Rfl:     fluticasone (FLONASE) 50 mcg/act nasal spray, 2 sprays into each nostril daily (Patient taking differently: 2 sprays into each nostril 2 (two) times a day as needed ), Disp: 1 Bottle, Rfl: 5    levocetirizine (XYZAL) 5 MG tablet, Take 1 tablet (5 mg total) by mouth every evening, Disp: 30 tablet, Rfl: 5    levothyroxine 137 mcg tablet, Take 1 tablet (137 mcg total) by mouth daily, Disp: 30 tablet, Rfl: 5    Magnesium 100 MG CAPS, Take by mouth daily, Disp: , Rfl:     Multiple Vitamin (DAILY VALUE MULTIVITAMIN) TABS, Take by mouth, Disp: , Rfl:     omega-3-acid ethyl esters (LOVAZA) 1 g capsule, Take 2 capsules (2 g total) by mouth 2 (two) times a day, Disp: 360 capsule, Rfl: 1    rosuvastatin (CRESTOR) 20 MG tablet, Take 1 tablet (20 mg total) by mouth daily, Disp: 30 tablet, Rfl: 5    lisinopril (ZESTRIL) 5 mg tablet, Take 1 tablet (5 mg total) by mouth daily (Patient not taking: Reported on 9/30/2021), Disp: 30 tablet, Rfl: 5    Probiotic Product (PROBIOTIC PO), Take by mouth (Patient not taking: Reported on 9/30/2021), Disp: , Rfl:       Vitals:    09/30/21 0814   BP: 148/84   Pulse: 65   SpO2: 98%     Weight (last 2 days)     Date/Time   Weight    09/30/21 0814   100 (221 4)            Physical Exam  Constitutional:       General: He is not in acute distress  Appearance: He is not diaphoretic  HENT:      Head: Normocephalic and atraumatic  Eyes:      General: No scleral icterus  Conjunctiva/sclera: Conjunctivae normal    Neck:      Vascular: No JVD  Cardiovascular:      Rate and Rhythm: Normal rate and regular rhythm  Heart sounds: Normal heart sounds  No murmur heard  Pulmonary:      Effort: Pulmonary effort is normal  No respiratory distress  Breath sounds: Normal breath sounds  No decreased breath sounds, wheezing, rhonchi or rales  Musculoskeletal:      Cervical back: Normal range of motion  Right lower leg: Normal  No edema  Left lower leg: Normal  No edema  Skin:     General: Skin is warm and dry  Neurological:      Mental Status: He is alert and oriented to person, place, and time             Laboratory Studies:  Lab Results   Component Value Date    HGBA1C 5 7 03/01/2021     11/24/2017     05/05/2017     12/16/2016    K 4 4 07/07/2021    K 4 5 10/05/2020    K 4 8 11/04/2019     07/07/2021     10/05/2020     11/04/2019    CO2 27 07/07/2021    CO2 25 10/05/2020    CO2 24 11/04/2019    GLUCOSE 101 (H) 11/24/2017    GLUCOSE 101 (H) 05/05/2017    GLUCOSE 94 12/16/2016    CREATININE 1 07 07/07/2021    CREATININE 1 13 10/05/2020    CREATININE 1 24 11/04/2019    CREATININE 1 10 11/24/2017    CREATININE 1 21 05/05/2017    CREATININE 1 25 12/16/2016    BUN 15 07/07/2021    BUN 25 (H) 10/05/2020    BUN 18 11/04/2019     Lab Results   Component Value Date    WBC 6 4 10/05/2020    WBC 5 73 06/27/2014    RBC 4 59 10/05/2020    RBC 4 72 06/27/2014    HGB 13 9 10/05/2020    HGB 14 0 06/27/2014    HCT 41 6 10/05/2020    HCT 40 9 06/27/2014    MCV 91 10/05/2020    MCV 87 06/27/2014    MCH 30 3 10/05/2020    MCH 29 7 06/27/2014    RDW 12 8 10/05/2020    RDW 13 1 06/27/2014     10/05/2020     06/27/2014     NT-proBNP: No results found for: NTBNP   CBC:  Lab Results   Component Value Date    WBC 6 4 10/05/2020    WBC 5 73 06/27/2014    RBC 4 59 10/05/2020    RBC 4 72 06/27/2014    HGB 13 9 10/05/2020    HGB 14 0 06/27/2014    HCT 41 6 10/05/2020    HCT 40 9 06/27/2014    MCV 91 10/05/2020    MCV 87 06/27/2014    MCH 30 3 10/05/2020    MCH 29 7 06/27/2014    RDW 12 8 10/05/2020    RDW 13 1 06/27/2014     10/05/2020     06/27/2014     Coags:    Lipid Profile:   Lab Results   Component Value Date    CHOL 168 11/24/2017     Lab Results   Component Value Date    HDL 36 (L) 07/07/2021     Lab Results   Component Value Date    LDLCALC 82 07/07/2021     Lab Results   Component Value Date    TRIG 174 (H) 07/07/2021       Cardiac testing:     EKG reviewed personally:   Results for orders placed or performed in visit on 09/30/21   POCT ECG    Impression    NSR, normal ECG         Stress test  11/18-stress treadmill normal  2020-stress treadmill normal    Calcium score CT  2020-calcium score equals 0    Nick Hall MD    Portions of the record may have been created with voice recognition software   Occasional wrong word or "sound a like" substitutions may have occurred due to the inherent limitations of voice recognition software   Read the chart carefully and recognize, using context, where substitutions have occurred

## 2021-09-30 NOTE — PATIENT INSTRUCTIONS
Abdi exam remains normal  Omar EKG remains normal  His calcium score was 0 in 2020  LDL is well controlled on rosuvastatin  Triglycerides are marginally elevated  Weight is up 13 lb over the last 2 years  Blood pressure is uncontrolled likely due to lifestyle changes over the last couple years  I have advised only single portions for dinner  Strict avoidance of sodium containing foods  I would consider intermittent fasting, with skipping breakfast, as a way to decrease calories at least for 2-4 weeks to aid in more effective weight loss  I would recommend in the meantime resuming lisinopril 5 mg daily  Goal blood pressures less than 135/85, he has the ability to check at home

## 2021-10-05 ENCOUNTER — CONSULT (OUTPATIENT)
Dept: SURGERY | Facility: CLINIC | Age: 56
End: 2021-10-05
Payer: COMMERCIAL

## 2021-10-05 VITALS — BODY MASS INDEX: 32.58 KG/M2 | HEIGHT: 69 IN | WEIGHT: 220 LBS

## 2021-10-05 DIAGNOSIS — K42.9 UMBILICAL HERNIA WITHOUT OBSTRUCTION AND WITHOUT GANGRENE: ICD-10-CM

## 2021-10-05 PROCEDURE — 99202 OFFICE O/P NEW SF 15 MIN: CPT | Performed by: SURGERY

## 2021-10-05 PROCEDURE — 1036F TOBACCO NON-USER: CPT | Performed by: SURGERY

## 2021-10-05 PROCEDURE — 3008F BODY MASS INDEX DOCD: CPT | Performed by: SURGERY

## 2021-10-06 ENCOUNTER — PREP FOR PROCEDURE (OUTPATIENT)
Dept: SURGERY | Facility: CLINIC | Age: 56
End: 2021-10-06

## 2021-10-06 DIAGNOSIS — K42.9 UMBILICAL HERNIA: Primary | ICD-10-CM

## 2021-10-20 ENCOUNTER — ANESTHESIA EVENT (OUTPATIENT)
Dept: PERIOP | Facility: AMBULARY SURGERY CENTER | Age: 56
End: 2021-10-20
Payer: COMMERCIAL

## 2021-10-30 LAB
HBA1C MFR BLD: 5.8 % (ref 4.8–5.6)
SL AMB T4, FREE (DIRECT): 1.35 NG/DL (ref 0.82–1.77)
TSH SERPL DL<=0.005 MIU/L-ACNC: 0.27 UIU/ML (ref 0.45–4.5)

## 2021-11-04 ENCOUNTER — ANESTHESIA (OUTPATIENT)
Dept: PERIOP | Facility: AMBULARY SURGERY CENTER | Age: 56
End: 2021-11-04
Payer: COMMERCIAL

## 2021-11-04 ENCOUNTER — HOSPITAL ENCOUNTER (OUTPATIENT)
Facility: AMBULARY SURGERY CENTER | Age: 56
Setting detail: OUTPATIENT SURGERY
Discharge: HOME | End: 2021-11-04
Attending: SURGERY | Admitting: SURGERY
Payer: COMMERCIAL

## 2021-11-04 VITALS
OXYGEN SATURATION: 94 % | HEIGHT: 69 IN | TEMPERATURE: 98 F | WEIGHT: 206 LBS | BODY MASS INDEX: 30.51 KG/M2 | DIASTOLIC BLOOD PRESSURE: 66 MMHG | HEART RATE: 73 BPM | RESPIRATION RATE: 20 BRPM | SYSTOLIC BLOOD PRESSURE: 119 MMHG

## 2021-11-04 DIAGNOSIS — K42.9 UMBILICAL HERNIA WITHOUT OBSTRUCTION AND WITHOUT GANGRENE: Primary | ICD-10-CM

## 2021-11-04 PROCEDURE — NC001 PR NO CHARGE: Performed by: SURGERY

## 2021-11-04 PROCEDURE — 49585 PR REPAIR UMBILICAL HERN,5+Y/O,REDUC: CPT | Performed by: SURGERY

## 2021-11-04 PROCEDURE — C1781 MESH (IMPLANTABLE): HCPCS | Performed by: SURGERY

## 2021-11-04 DEVICE — VENTRALEX ST HERNIA PATCH
Type: IMPLANTABLE DEVICE | Site: UMBILICAL | Status: FUNCTIONAL
Brand: VENTRALEX ST HERNIA PATCH

## 2021-11-04 RX ORDER — SODIUM CHLORIDE, SODIUM LACTATE, POTASSIUM CHLORIDE, CALCIUM CHLORIDE 600; 310; 30; 20 MG/100ML; MG/100ML; MG/100ML; MG/100ML
20 INJECTION, SOLUTION INTRAVENOUS CONTINUOUS
Status: CANCELLED | OUTPATIENT
Start: 2021-11-04

## 2021-11-04 RX ORDER — ONDANSETRON 2 MG/ML
INJECTION INTRAMUSCULAR; INTRAVENOUS AS NEEDED
Status: DISCONTINUED | OUTPATIENT
Start: 2021-11-04 | End: 2021-11-04

## 2021-11-04 RX ORDER — EPHEDRINE SULFATE 50 MG/ML
INJECTION INTRAVENOUS AS NEEDED
Status: DISCONTINUED | OUTPATIENT
Start: 2021-11-04 | End: 2021-11-04

## 2021-11-04 RX ORDER — FENTANYL CITRATE 50 UG/ML
INJECTION, SOLUTION INTRAMUSCULAR; INTRAVENOUS AS NEEDED
Status: DISCONTINUED | OUTPATIENT
Start: 2021-11-04 | End: 2021-11-04

## 2021-11-04 RX ORDER — MAGNESIUM HYDROXIDE 1200 MG/15ML
LIQUID ORAL AS NEEDED
Status: DISCONTINUED | OUTPATIENT
Start: 2021-11-04 | End: 2021-11-04 | Stop reason: HOSPADM

## 2021-11-04 RX ORDER — OXYCODONE HYDROCHLORIDE 5 MG/1
5 TABLET ORAL EVERY 4 HOURS PRN
Status: DISCONTINUED | OUTPATIENT
Start: 2021-11-04 | End: 2021-11-04 | Stop reason: HOSPADM

## 2021-11-04 RX ORDER — BUPIVACAINE HYDROCHLORIDE 2.5 MG/ML
INJECTION, SOLUTION EPIDURAL; INFILTRATION; INTRACAUDAL AS NEEDED
Status: DISCONTINUED | OUTPATIENT
Start: 2021-11-04 | End: 2021-11-04 | Stop reason: HOSPADM

## 2021-11-04 RX ORDER — SODIUM CHLORIDE, SODIUM LACTATE, POTASSIUM CHLORIDE, CALCIUM CHLORIDE 600; 310; 30; 20 MG/100ML; MG/100ML; MG/100ML; MG/100ML
INJECTION, SOLUTION INTRAVENOUS CONTINUOUS PRN
Status: DISCONTINUED | OUTPATIENT
Start: 2021-11-04 | End: 2021-11-04

## 2021-11-04 RX ORDER — ONDANSETRON 2 MG/ML
4 INJECTION INTRAMUSCULAR; INTRAVENOUS ONCE AS NEEDED
Status: DISCONTINUED | OUTPATIENT
Start: 2021-11-04 | End: 2021-11-04 | Stop reason: HOSPADM

## 2021-11-04 RX ORDER — LIDOCAINE HYDROCHLORIDE 10 MG/ML
0.5 INJECTION, SOLUTION EPIDURAL; INFILTRATION; INTRACAUDAL; PERINEURAL ONCE AS NEEDED
Status: DISCONTINUED | OUTPATIENT
Start: 2021-11-04 | End: 2021-11-04 | Stop reason: HOSPADM

## 2021-11-04 RX ORDER — CEFAZOLIN SODIUM 2 G/50ML
SOLUTION INTRAVENOUS AS NEEDED
Status: DISCONTINUED | OUTPATIENT
Start: 2021-11-04 | End: 2021-11-04

## 2021-11-04 RX ORDER — HYDROMORPHONE HCL/PF 1 MG/ML
0.2 SYRINGE (ML) INJECTION EVERY 4 HOURS PRN
Status: DISCONTINUED | OUTPATIENT
Start: 2021-11-04 | End: 2021-11-04 | Stop reason: HOSPADM

## 2021-11-04 RX ORDER — MIDAZOLAM HYDROCHLORIDE 2 MG/2ML
INJECTION, SOLUTION INTRAMUSCULAR; INTRAVENOUS AS NEEDED
Status: DISCONTINUED | OUTPATIENT
Start: 2021-11-04 | End: 2021-11-04

## 2021-11-04 RX ORDER — FENTANYL CITRATE/PF 50 MCG/ML
25 SYRINGE (ML) INJECTION
Status: DISCONTINUED | OUTPATIENT
Start: 2021-11-04 | End: 2021-11-04 | Stop reason: HOSPADM

## 2021-11-04 RX ORDER — DEXAMETHASONE SODIUM PHOSPHATE 4 MG/ML
INJECTION, SOLUTION INTRA-ARTICULAR; INTRALESIONAL; INTRAMUSCULAR; INTRAVENOUS; SOFT TISSUE AS NEEDED
Status: DISCONTINUED | OUTPATIENT
Start: 2021-11-04 | End: 2021-11-04

## 2021-11-04 RX ORDER — LIDOCAINE HYDROCHLORIDE 10 MG/ML
INJECTION, SOLUTION EPIDURAL; INFILTRATION; INTRACAUDAL; PERINEURAL AS NEEDED
Status: DISCONTINUED | OUTPATIENT
Start: 2021-11-04 | End: 2021-11-04

## 2021-11-04 RX ORDER — SODIUM CHLORIDE, SODIUM LACTATE, POTASSIUM CHLORIDE, CALCIUM CHLORIDE 600; 310; 30; 20 MG/100ML; MG/100ML; MG/100ML; MG/100ML
125 INJECTION, SOLUTION INTRAVENOUS CONTINUOUS
Status: DISCONTINUED | OUTPATIENT
Start: 2021-11-04 | End: 2021-11-04 | Stop reason: HOSPADM

## 2021-11-04 RX ORDER — CEFAZOLIN SODIUM 2 G/50ML
2000 SOLUTION INTRAVENOUS ONCE
Status: DISCONTINUED | OUTPATIENT
Start: 2021-11-04 | End: 2021-11-04 | Stop reason: HOSPADM

## 2021-11-04 RX ORDER — OXYCODONE HYDROCHLORIDE AND ACETAMINOPHEN 5; 325 MG/1; MG/1
1 TABLET ORAL EVERY 4 HOURS PRN
Qty: 10 TABLET | Refills: 0 | Status: SHIPPED | OUTPATIENT
Start: 2021-11-04 | End: 2022-01-24

## 2021-11-04 RX ORDER — OXYCODONE HYDROCHLORIDE 5 MG/1
5 TABLET ORAL EVERY 4 HOURS PRN
Status: DISCONTINUED | OUTPATIENT
Start: 2021-11-04 | End: 2021-11-04

## 2021-11-04 RX ORDER — PROPOFOL 10 MG/ML
INJECTION, EMULSION INTRAVENOUS AS NEEDED
Status: DISCONTINUED | OUTPATIENT
Start: 2021-11-04 | End: 2021-11-04

## 2021-11-04 RX ORDER — ACETAMINOPHEN 325 MG/1
650 TABLET ORAL EVERY 6 HOURS PRN
Status: DISCONTINUED | OUTPATIENT
Start: 2021-11-04 | End: 2021-11-04 | Stop reason: HOSPADM

## 2021-11-04 RX ORDER — KETOROLAC TROMETHAMINE 30 MG/ML
INJECTION, SOLUTION INTRAMUSCULAR; INTRAVENOUS AS NEEDED
Status: DISCONTINUED | OUTPATIENT
Start: 2021-11-04 | End: 2021-11-04

## 2021-11-04 RX ADMIN — EPHEDRINE SULFATE 10 MG: 50 INJECTION, SOLUTION INTRAVENOUS at 12:46

## 2021-11-04 RX ADMIN — ACETAMINOPHEN 650 MG: 325 TABLET, FILM COATED ORAL at 14:13

## 2021-11-04 RX ADMIN — KETOROLAC TROMETHAMINE 15 MG: 30 INJECTION, SOLUTION INTRAMUSCULAR at 12:50

## 2021-11-04 RX ADMIN — MIDAZOLAM HYDROCHLORIDE 2 MG: 1 INJECTION, SOLUTION INTRAMUSCULAR; INTRAVENOUS at 12:13

## 2021-11-04 RX ADMIN — FENTANYL CITRATE 25 MCG: 50 INJECTION INTRAMUSCULAR; INTRAVENOUS at 13:28

## 2021-11-04 RX ADMIN — DEXAMETHASONE SODIUM PHOSPHATE 4 MG: 4 INJECTION INTRA-ARTICULAR; INTRALESIONAL; INTRAMUSCULAR; INTRAVENOUS; SOFT TISSUE at 12:23

## 2021-11-04 RX ADMIN — PROPOFOL 200 MG: 10 INJECTION, EMULSION INTRAVENOUS at 12:21

## 2021-11-04 RX ADMIN — SODIUM CHLORIDE, SODIUM LACTATE, POTASSIUM CHLORIDE, AND CALCIUM CHLORIDE 125 ML/HR: .6; .31; .03; .02 INJECTION, SOLUTION INTRAVENOUS at 14:16

## 2021-11-04 RX ADMIN — FENTANYL CITRATE 25 MCG: 50 INJECTION, SOLUTION INTRAMUSCULAR; INTRAVENOUS at 12:25

## 2021-11-04 RX ADMIN — CEFAZOLIN SODIUM 2000 MG: 2 SOLUTION INTRAVENOUS at 12:15

## 2021-11-04 RX ADMIN — FENTANYL CITRATE 25 MCG: 50 INJECTION INTRAMUSCULAR; INTRAVENOUS at 13:20

## 2021-11-04 RX ADMIN — FENTANYL CITRATE 25 MCG: 50 INJECTION, SOLUTION INTRAMUSCULAR; INTRAVENOUS at 12:37

## 2021-11-04 RX ADMIN — SODIUM CHLORIDE, SODIUM LACTATE, POTASSIUM CHLORIDE, AND CALCIUM CHLORIDE: .6; .31; .03; .02 INJECTION, SOLUTION INTRAVENOUS at 12:17

## 2021-11-04 RX ADMIN — ONDANSETRON 4 MG: 2 INJECTION INTRAMUSCULAR; INTRAVENOUS at 12:23

## 2021-11-04 RX ADMIN — FENTANYL CITRATE 25 MCG: 50 INJECTION, SOLUTION INTRAMUSCULAR; INTRAVENOUS at 12:21

## 2021-11-04 RX ADMIN — LIDOCAINE HYDROCHLORIDE 50 MG: 10 INJECTION, SOLUTION EPIDURAL; INFILTRATION; INTRACAUDAL at 12:21

## 2021-11-04 RX ADMIN — FENTANYL CITRATE 25 MCG: 50 INJECTION, SOLUTION INTRAMUSCULAR; INTRAVENOUS at 12:30

## 2021-11-04 RX ADMIN — OXYCODONE HYDROCHLORIDE 5 MG: 5 TABLET ORAL at 14:13

## 2021-11-04 RX ADMIN — FENTANYL CITRATE 25 MCG: 50 INJECTION INTRAMUSCULAR; INTRAVENOUS at 13:14

## 2021-11-05 ENCOUNTER — OFFICE VISIT (OUTPATIENT)
Dept: OBGYN CLINIC | Facility: CLINIC | Age: 56
End: 2021-11-05
Payer: COMMERCIAL

## 2021-11-05 VITALS
HEART RATE: 56 BPM | SYSTOLIC BLOOD PRESSURE: 102 MMHG | BODY MASS INDEX: 30.36 KG/M2 | HEIGHT: 69 IN | WEIGHT: 205 LBS | DIASTOLIC BLOOD PRESSURE: 65 MMHG

## 2021-11-05 DIAGNOSIS — M77.11 LATERAL EPICONDYLITIS OF RIGHT ELBOW: Primary | ICD-10-CM

## 2021-11-05 DIAGNOSIS — M77.01 MEDIAL EPICONDYLITIS OF RIGHT ELBOW: ICD-10-CM

## 2021-11-05 PROCEDURE — 3074F SYST BP LT 130 MM HG: CPT | Performed by: ORTHOPAEDIC SURGERY

## 2021-11-05 PROCEDURE — 1036F TOBACCO NON-USER: CPT | Performed by: ORTHOPAEDIC SURGERY

## 2021-11-05 PROCEDURE — 99213 OFFICE O/P EST LOW 20 MIN: CPT | Performed by: ORTHOPAEDIC SURGERY

## 2021-11-05 PROCEDURE — 3078F DIAST BP <80 MM HG: CPT | Performed by: ORTHOPAEDIC SURGERY

## 2021-11-05 PROCEDURE — 20551 NJX 1 TENDON ORIGIN/INSJ: CPT | Performed by: ORTHOPAEDIC SURGERY

## 2021-11-05 PROCEDURE — 3008F BODY MASS INDEX DOCD: CPT | Performed by: ORTHOPAEDIC SURGERY

## 2021-11-05 RX ORDER — LIDOCAINE HYDROCHLORIDE 10 MG/ML
1 INJECTION, SOLUTION INFILTRATION; PERINEURAL
Status: COMPLETED | OUTPATIENT
Start: 2021-11-05 | End: 2021-11-05

## 2021-11-05 RX ORDER — METHYLPREDNISOLONE ACETATE 40 MG/ML
0.5 INJECTION, SUSPENSION INTRA-ARTICULAR; INTRALESIONAL; INTRAMUSCULAR; SOFT TISSUE
Status: COMPLETED | OUTPATIENT
Start: 2021-11-05 | End: 2021-11-05

## 2021-11-05 RX ADMIN — LIDOCAINE HYDROCHLORIDE 1 ML: 10 INJECTION, SOLUTION INFILTRATION; PERINEURAL at 10:17

## 2021-11-05 RX ADMIN — METHYLPREDNISOLONE ACETATE 0.5 ML: 40 INJECTION, SUSPENSION INTRA-ARTICULAR; INTRALESIONAL; INTRAMUSCULAR; SOFT TISSUE at 10:17

## 2021-11-22 ENCOUNTER — OFFICE VISIT (OUTPATIENT)
Dept: URGENT CARE | Facility: MEDICAL CENTER | Age: 56
End: 2021-11-22
Payer: COMMERCIAL

## 2021-11-22 VITALS
BODY MASS INDEX: 30.36 KG/M2 | HEIGHT: 69 IN | WEIGHT: 205 LBS | HEART RATE: 90 BPM | RESPIRATION RATE: 18 BRPM | TEMPERATURE: 99 F | OXYGEN SATURATION: 96 %

## 2021-11-22 DIAGNOSIS — J06.9 ACUTE URI: Primary | ICD-10-CM

## 2021-11-22 PROCEDURE — U0003 INFECTIOUS AGENT DETECTION BY NUCLEIC ACID (DNA OR RNA); SEVERE ACUTE RESPIRATORY SYNDROME CORONAVIRUS 2 (SARS-COV-2) (CORONAVIRUS DISEASE [COVID-19]), AMPLIFIED PROBE TECHNIQUE, MAKING USE OF HIGH THROUGHPUT TECHNOLOGIES AS DESCRIBED BY CMS-2020-01-R: HCPCS | Performed by: PHYSICIAN ASSISTANT

## 2021-11-22 PROCEDURE — 99213 OFFICE O/P EST LOW 20 MIN: CPT | Performed by: PHYSICIAN ASSISTANT

## 2021-11-22 PROCEDURE — U0005 INFEC AGEN DETEC AMPLI PROBE: HCPCS | Performed by: PHYSICIAN ASSISTANT

## 2021-11-22 RX ORDER — AMOXICILLIN AND CLAVULANATE POTASSIUM 875; 125 MG/1; MG/1
1 TABLET, FILM COATED ORAL EVERY 12 HOURS SCHEDULED
Qty: 14 TABLET | Refills: 0 | Status: SHIPPED | OUTPATIENT
Start: 2021-11-22 | End: 2021-11-29

## 2021-11-23 LAB — SARS-COV-2 RNA RESP QL NAA+PROBE: NEGATIVE

## 2021-12-03 ENCOUNTER — TELEPHONE (OUTPATIENT)
Dept: OTOLARYNGOLOGY | Facility: CLINIC | Age: 56
End: 2021-12-03

## 2021-12-03 DIAGNOSIS — J30.89 NON-SEASONAL ALLERGIC RHINITIS, UNSPECIFIED TRIGGER: ICD-10-CM

## 2021-12-03 DIAGNOSIS — J32.4 CHRONIC PANSINUSITIS: ICD-10-CM

## 2021-12-03 DIAGNOSIS — J01.01 ACUTE RECURRENT MAXILLARY SINUSITIS: ICD-10-CM

## 2021-12-03 RX ORDER — PREDNISONE 10 MG/1
TABLET ORAL
Qty: 50 TABLET | Refills: 0 | Status: SHIPPED | OUTPATIENT
Start: 2021-12-03 | End: 2022-03-21

## 2021-12-14 LAB — TSH SERPL DL<=0.005 MIU/L-ACNC: 1.93 UIU/ML (ref 0.45–4.5)

## 2021-12-16 ENCOUNTER — HOSPITAL ENCOUNTER (OUTPATIENT)
Dept: CT IMAGING | Facility: HOSPITAL | Age: 56
Discharge: HOME | End: 2021-12-16
Attending: OTOLARYNGOLOGY
Payer: COMMERCIAL

## 2021-12-16 DIAGNOSIS — J30.89 NON-SEASONAL ALLERGIC RHINITIS, UNSPECIFIED TRIGGER: ICD-10-CM

## 2021-12-16 DIAGNOSIS — J01.01 ACUTE RECURRENT MAXILLARY SINUSITIS: ICD-10-CM

## 2021-12-16 DIAGNOSIS — J32.4 CHRONIC PANSINUSITIS: ICD-10-CM

## 2021-12-16 PROCEDURE — G1004 CDSM NDSC: HCPCS

## 2021-12-16 PROCEDURE — 70486 CT MAXILLOFACIAL W/O DYE: CPT

## 2021-12-27 DIAGNOSIS — E03.8 OTHER SPECIFIED HYPOTHYROIDISM: ICD-10-CM

## 2021-12-27 RX ORDER — LEVOTHYROXINE SODIUM 0.12 MG/1
TABLET ORAL
Qty: 30 TABLET | Refills: 2 | Status: SHIPPED | OUTPATIENT
Start: 2021-12-27 | End: 2022-02-01 | Stop reason: SDUPTHER

## 2022-01-24 ENCOUNTER — TELEMEDICINE (OUTPATIENT)
Dept: FAMILY MEDICINE CLINIC | Facility: CLINIC | Age: 57
End: 2022-01-24
Payer: COMMERCIAL

## 2022-01-24 DIAGNOSIS — J01.91 ACUTE RECURRENT SINUSITIS, UNSPECIFIED LOCATION: ICD-10-CM

## 2022-01-24 DIAGNOSIS — U07.1 COVID-19: Primary | ICD-10-CM

## 2022-01-24 PROCEDURE — 99213 OFFICE O/P EST LOW 20 MIN: CPT | Performed by: PHYSICIAN ASSISTANT

## 2022-01-24 RX ORDER — CEPHALEXIN 500 MG/1
500 CAPSULE ORAL EVERY 12 HOURS SCHEDULED
Qty: 20 CAPSULE | Refills: 0 | Status: SHIPPED | OUTPATIENT
Start: 2022-01-24 | End: 2022-02-03

## 2022-01-24 NOTE — PROGRESS NOTES
COVID-19 Outpatient Progress Note    Assessment/Plan:    Problem List Items Addressed This Visit     None      Visit Diagnoses     COVID-19    -  Primary    Acute recurrent sinusitis, unspecified location        Relevant Medications    cephalexin (KEFLEX) 500 mg capsule         Disposition:     Patient has COVID-19 infection  Based off CDC guidelines, they were recommended to isolate for 5 days from the date of the positive test  If they remain asymptomatic, isolation may be ended followed by 5 days of wearing a mask when around othes to minimize risk of infecting others  If they have a fever, continue to stay home until fever resolves for at least 24 hours  I have spent 6 minutes directly with the patient  Greater than 50% of this time was spent in counseling/coordination of care regarding: instructions for management  Patient started with COVID symptoms last week he did a home test which was positive on Thursday  He states overall he is feeling better however he is developed sinus infection  He does have a history of chronic sinusitis he has been seen the ENT  COVID fatigue continues to hang on some headaches  He is not short of breath he did lose is smell or taste  Fever is gone     Encounter provider Jaun Eagle PA-C    Provider located at [address] [place] [id number] [phone]    Recent Visits  No visits were found meeting these conditions  Showing recent visits within past 7 days and meeting all other requirements  Today's Visits  Date Type Provider Dept   01/24/22 Telemedicine [facility]   Showing today's visits and meeting all other requirements  Future Appointments  No visits were found meeting these conditions    Showing future appointments within next 150 days and meeting all other requirements     This virtual check-in was done via [employer] [address] and patient was informed that this is a secure, HIPAA-compliant platform  He agrees to proceed  Patient agrees to participate in a virtual check in via telephone or video visit instead of presenting to the office to address urgent/immediate medical needs  Patient is aware this is a billable service  After connecting through [facility], the patient was identified by name and date of birth  Wendi Bhatti  was informed that this was a telemedicine visit and that the exam was being conducted confidentially over secure lines  My office door was closed  No one else was in the room  Wendi Bhatti  acknowledged consent and understanding of privacy and security of the telemedicine visit  I informed the patient that I have reviewed his record in Epic and presented the opportunity for him to ask any questions regarding the visit today  The patient agreed to participate  Verification of patient location:  Patient is located in the following state in which I hold an active license: [place]    Subjective:   Wendi Bhatti  is a 49 y o  male who has been screened for COVID-19  Symptom change since last report: improving  Patient's symptoms include fatigue, nasal congestion, sore throat and headache  Patient denies fever, shortness of breath and chest tightness      - Date of symptom onset: 1/18/2022  - Date of positive COVID-19 test: 1/20/2022  Type of test: Home antigen  COVID-19 vaccination status: Not vaccinated    Michaela Live has been staying home and has isolated themselves in his home  He is taking care to not share personal items and is cleaning all surfaces that are touched often, like counters, tabletops, and doorknobs using household cleaning sprays or wipes  He is wearing a mask when he leaves his room       Lab Results   Component Value Date    SARSCOV2 Negative 11/22/2021     Past Medical History:   Diagnosis Date    Chronic sinusitis     LAST ASSESSED: [id number]    Closed fracture of lower jaw bone (HCC)     Hyperlipidemia     Seasonal allergies  Sleep apnea     Doesn't require cpap post UPP surgery     Past Surgical History:   Procedure Laterality Date    MANDIBLE FRACTURE SURGERY      as a child    [place] COLONOSCOPY FLX DX W/COLLJ SPEC WHEN PFRMD N/A 2/20/2017    Procedure: COLONOSCOPY;  Surgeon: Milli Leyva MD;  [facility];   Service: Gastroenterology    [place] REINSERT BI[facility]ICEPS [facility]  8/31/2017    Procedure: RIGHT DISTAL BICEPS TENDON REPAIR;  Surgeon: Domingo Palmer MD;  [facility];  Service: Orthopedics    ME REPAIR UMBILICAL ECPP,2+Q/R,VQAFE N/A 11/4/2021    Procedure: UMBILICAL HERNIA REPAIR;  Surgeon: Jose Elias Hernandez MD;  [facility];  Service: General    SINUS SURGERY      TONSILLECTOMY      TONSILLECTOMY AND ADENOIDECTOMY      UVULOPALATOPHARYNGOPLASTY      WISDOM TOOTH EXTRACTION       Current Outpatient Medications   Medication Sig Dispense Refill    cephalexin (KEFLEX) 500 mg capsule Take 1 capsule (500 mg total) by mouth every 12 (twelve) hours for 10 days 20 capsule 0    Cholecalciferol (VITAMIN D3) 1000 units CAPS Take by mouth      co-enzyme Q-10 100 mg capsule Take by mouth      fluticasone (FLONASE) 50 mcg/act nasal spray 2 sprays into each nostril daily (Patient taking differently: 2 sprays into each nostril 2 (two) times a day as needed ) 1 Bottle 5    fluticasone (FLONASE) 50 mcg/act nasal spray 2 sprays into each nostril daily 16 g 3    levocetirizine (XYZAL) 5 MG tablet Take 1 tablet (5 mg total) by mouth every evening 30 tablet 5    levothyroxine 125 mcg tablet TAKE 1 TABLET BY MOUTH EVERY DAY 30 tablet 2    lisinopril (ZESTRIL) 5 mg tablet Take 1 tablet (5 mg total) by mouth daily 90 tablet 3    Magnesium 100 MG CAPS Take by mouth daily      Multiple Vitamin (DAILY VALUE MULTIVITAMIN) TABS Take by mouth      omega-3-acid ethyl esters (LOVAZA) 1 g capsule Take 2 capsules (2 g total) by mouth 2 (two) times a day 360 capsule 1    predniSONE 10 mg tablet 5 tabls PO in morning  days 1-3, 4 tabls days 4-6, 3 tabls days 7-9, 2 tabls [id number], 1 tabl PO in morning  days 13-15 50 tablet 0    rosuvastatin (CRESTOR) 20 MG tablet Take 1 tablet (20 mg total) by mouth daily 30 tablet 5     No current facility-administered medications for this visit  Allergies   Allergen Reactions    Dust Mite Extract Sneezing     --sinus infection     Other Other (See Comments)     [facility] - [id number]: Hayfever-sinus infection        Review of Systems   Constitutional: Positive for fatigue  Negative for fever  HENT: Positive for congestion, sinus pressure, sinus pain and sore throat  Respiratory: Negative for chest tightness and shortness of breath  Neurological: Positive for headaches  Objective: There were no vitals filed for this visit  Physical Exam  Constitutional:       General: He is not in acute distress  Appearance: Normal appearance  He is not ill-appearing  HENT:      Head: Normocephalic and atraumatic  Right Ear: External ear normal       Left Ear: External ear normal    Pulmonary:      Effort: Pulmonary effort is normal    Skin:     Coloration: Skin is not pale  Findings: No erythema  VIRTUAL VISIT DISCLAIMER    Olvin Feliciano  verbally agrees to participate in [employer]  Pt is aware that [employer] could be limited without vital signs or the ability to perform a full hands-on physical exam  Omar Jane  understands he or the provider may request at any time to terminate the video visit and request the patient to seek care or treatment in person

## 2022-02-01 DIAGNOSIS — E03.8 OTHER SPECIFIED HYPOTHYROIDISM: ICD-10-CM

## 2022-02-01 RX ORDER — LEVOTHYROXINE SODIUM 0.12 MG/1
125 TABLET ORAL DAILY
Qty: 30 TABLET | Refills: 2 | Status: SHIPPED | OUTPATIENT
Start: 2022-02-01 | End: 2022-02-21

## 2022-02-01 RX ORDER — LEVOTHYROXINE SODIUM 0.12 MG/1
125 TABLET ORAL DAILY
Qty: 30 TABLET | Refills: 2 | Status: SHIPPED | OUTPATIENT
Start: 2022-02-01 | End: 2022-02-01 | Stop reason: CLARIF

## 2022-02-01 NOTE — TELEPHONE ENCOUNTER
Patient initiated a refill request for Levothyroxine to be sent to [facility] (pre-packaged med by the day)

## 2022-02-20 DIAGNOSIS — E78.1 ESSENTIAL HYPERTRIGLYCERIDEMIA: ICD-10-CM

## 2022-02-20 DIAGNOSIS — E03.8 OTHER SPECIFIED HYPOTHYROIDISM: ICD-10-CM

## 2022-02-20 DIAGNOSIS — I10 BENIGN ESSENTIAL HYPERTENSION: ICD-10-CM

## 2022-02-20 DIAGNOSIS — J30.1 SEASONAL ALLERGIC RHINITIS DUE TO POLLEN: ICD-10-CM

## 2022-02-21 RX ORDER — ROSUVASTATIN CALCIUM 20 MG/1
TABLET, COATED ORAL
Qty: 30 TABLET | Refills: 5 | Status: SHIPPED | OUTPATIENT
Start: 2022-02-21

## 2022-02-21 RX ORDER — LEVOCETIRIZINE DIHYDROCHLORIDE 5 MG/1
TABLET, FILM COATED ORAL
Qty: 30 TABLET | Refills: 5 | Status: SHIPPED | OUTPATIENT
Start: 2022-02-21

## 2022-02-21 RX ORDER — LISINOPRIL 5 MG/1
TABLET ORAL
Qty: 30 TABLET | Refills: 5 | Status: SHIPPED | OUTPATIENT
Start: 2022-02-21

## 2022-02-21 RX ORDER — LEVOTHYROXINE SODIUM 0.12 MG/1
TABLET ORAL
Qty: 30 TABLET | Refills: 5 | Status: SHIPPED | OUTPATIENT
Start: 2022-02-21 | End: 2022-07-18

## 2022-03-09 DIAGNOSIS — J01.01 ACUTE RECURRENT MAXILLARY SINUSITIS: ICD-10-CM

## 2022-03-09 DIAGNOSIS — J30.89 NON-SEASONAL ALLERGIC RHINITIS, UNSPECIFIED TRIGGER: ICD-10-CM

## 2022-03-09 DIAGNOSIS — J32.4 CHRONIC PANSINUSITIS: ICD-10-CM

## 2022-03-09 DIAGNOSIS — J34.89 NASAL OBSTRUCTION: ICD-10-CM

## 2022-03-09 RX ORDER — FLUTICASONE PROPIONATE 50 MCG
2 SPRAY, SUSPENSION (ML) NASAL DAILY
Qty: 16 G | Refills: 3 | Status: SHIPPED | OUTPATIENT
Start: 2022-03-09

## 2022-03-21 ENCOUNTER — OFFICE VISIT (OUTPATIENT)
Dept: FAMILY MEDICINE CLINIC | Facility: CLINIC | Age: 57
End: 2022-03-21
Payer: COMMERCIAL

## 2022-03-21 ENCOUNTER — APPOINTMENT (OUTPATIENT)
Dept: RADIOLOGY | Facility: MEDICAL CENTER | Age: 57
End: 2022-03-21
Payer: COMMERCIAL

## 2022-03-21 VITALS
HEART RATE: 69 BPM | SYSTOLIC BLOOD PRESSURE: 120 MMHG | WEIGHT: 217 LBS | HEIGHT: 69 IN | RESPIRATION RATE: 16 BRPM | OXYGEN SATURATION: 97 % | TEMPERATURE: 96.9 F | BODY MASS INDEX: 32.14 KG/M2 | DIASTOLIC BLOOD PRESSURE: 84 MMHG

## 2022-03-21 DIAGNOSIS — I10 BENIGN ESSENTIAL HYPERTENSION: Primary | ICD-10-CM

## 2022-03-21 DIAGNOSIS — R73.03 PREDIABETES: ICD-10-CM

## 2022-03-21 DIAGNOSIS — J40 BRONCHITIS: ICD-10-CM

## 2022-03-21 DIAGNOSIS — Z86.010 HISTORY OF COLON POLYPS: ICD-10-CM

## 2022-03-21 DIAGNOSIS — R05.9 COUGH: ICD-10-CM

## 2022-03-21 DIAGNOSIS — E78.5 HYPERLIPIDEMIA, UNSPECIFIED HYPERLIPIDEMIA TYPE: ICD-10-CM

## 2022-03-21 DIAGNOSIS — J98.01 BRONCHOSPASM: ICD-10-CM

## 2022-03-21 DIAGNOSIS — E03.8 OTHER SPECIFIED HYPOTHYROIDISM: ICD-10-CM

## 2022-03-21 PROCEDURE — 71046 X-RAY EXAM CHEST 2 VIEWS: CPT

## 2022-03-21 PROCEDURE — 3008F BODY MASS INDEX DOCD: CPT | Performed by: INTERNAL MEDICINE

## 2022-03-21 PROCEDURE — 3079F DIAST BP 80-89 MM HG: CPT | Performed by: INTERNAL MEDICINE

## 2022-03-21 PROCEDURE — 99214 OFFICE O/P EST MOD 30 MIN: CPT | Performed by: INTERNAL MEDICINE

## 2022-03-21 PROCEDURE — 3074F SYST BP LT 130 MM HG: CPT | Performed by: INTERNAL MEDICINE

## 2022-03-21 PROCEDURE — 3725F SCREEN DEPRESSION PERFORMED: CPT | Performed by: INTERNAL MEDICINE

## 2022-03-21 PROCEDURE — 1036F TOBACCO NON-USER: CPT | Performed by: INTERNAL MEDICINE

## 2022-03-21 RX ORDER — METHYLPREDNISOLONE 4 MG/1
TABLET ORAL
Qty: 21 EACH | Refills: 0 | Status: SHIPPED | OUTPATIENT
Start: 2022-03-21

## 2022-03-21 RX ORDER — ALBUTEROL SULFATE 90 UG/1
2 AEROSOL, METERED RESPIRATORY (INHALATION) EVERY 6 HOURS PRN
Qty: 18 G | Refills: 1 | Status: SHIPPED | OUTPATIENT
Start: 2022-03-21

## 2022-03-21 RX ORDER — AZITHROMYCIN 250 MG/1
TABLET, FILM COATED ORAL
Qty: 6 TABLET | Refills: 0 | Status: SHIPPED | OUTPATIENT
Start: 2022-03-21 | End: 2022-03-26

## 2022-03-21 NOTE — PROGRESS NOTES
BMI Counseling: Body mass index is 32 05 kg/m²  The BMI is above normal  Nutrition recommendations include decreasing portion sizes and limiting drinks that contain sugar  Exercise recommendations include exercising 3-5 times per week  No pharmacotherapy was ordered  Patient referred to PCP  Rationale for BMI follow-up plan is due to patient being overweight or obese  Depression Screening and Follow-up Plan: Patient was screened for depression during today's encounter  They screened negative with a PHQ-2 score of 0  Assessment/Plan:         Diagnoses and all orders for this visit:    Benign essential hypertension  Comments:  controlled    History of colon polyps  -     Ambulatory Referral to Gastroenterology; Future    Other specified hypothyroidism  Comments:  due for lab  Orders:  -     TSH, 3rd generation with Free T4 reflex; Future    Hyperlipidemia, unspecified hyperlipidemia type  Comments:  on statin  Orders:  -     Lipid panel; Future  -     Comprehensive metabolic panel; Future    Cough  Comments:  2nd wheeze/ medrol b-agonist  Orders:  -     XR chest pa & lateral; Future    Bronchitis  Comments:  zpak/b-agonist  Orders:  -     XR chest pa & lateral; Future  -     azithromycin (Zithromax) 250 mg tablet; Take 2 tablets (500 mg total) by mouth daily for 1 day, THEN 1 tablet (250 mg total) daily for 4 days  Bronchospasm  -     albuterol (ProAir HFA) 90 mcg/act inhaler; Inhale 2 puffs every 6 (six) hours as needed for wheezing  -     methylPREDNISolone 4 MG tablet therapy pack; Use as directed on package    Prediabetes  -     Hemoglobin A1C; Future          Subjective:      Patient ID: Angel Lantigua  is a 49 y o  male  Pt states had covid in January and now has chr cough  Feels made full recovery but for the cough          The following portions of the patient's history were reviewed and updated as appropriate: He  has a past medical history of Chronic sinusitis, Closed fracture of lower jaw bone ([address] ), COVID, Hyperlipidemia, Seasonal allergies, and Sleep apnea  He   Patient Active Problem List    Diagnosis Date Noted    Hyperlipidemia     Umbilical hernia without obstruction and without gangrene 10/05/2021    Chronic pain of left knee 02/08/2019    Patellar tendinitis of left knee 02/08/2019    Family history of cardiomyopathy 09/27/2018    Family history of myocardial infarction 09/27/2018    Essential hypertriglyceridemia 01/18/2016    Hypothyroidism 01/06/2015    Benign essential hypertension 08/13/2014    Psoriasis 08/13/2014    Vitamin D deficiency 06/30/2014     He  has a past surgical history that includes Uvulopalatopharyngoplasty; Mandible fracture surgery; Tonsillectomy; pr colonoscopy flx dx w/collj spec when pfrmd (N/A, 2/20/2017); pr reinsert bi/triceps tendon,distal (8/31/2017); Warner tooth extraction; Sinus surgery; Tonsillectomy and adenoidectomy; and pr repair umbilical CUWR,3+Q/L,SUZETTE (N/A, 11/4/2021)  His family history includes Cancer in his maternal grandfather and mother; Cardiomyopathy in his father; Diabetes in his mother; Drug abuse in his brother; Heart attack (age of onset: 48) in his father; Heart disease in his father; Hypertension in his brother and mother; Stroke in his paternal grandfather  He  reports that he has never smoked  He has never used smokeless tobacco  He reports current alcohol use  He reports that he does not use drugs    Current Outpatient Medications   Medication Sig Dispense Refill    Cholecalciferol (VITAMIN D3) 1000 units CAPS Take by mouth      co-enzyme Q-10 100 mg capsule Take by mouth      fluticasone (FLONASE) 50 mcg/act nasal spray 2 sprays into each nostril daily 16 g 3    levocetirizine (XYZAL) 5 MG tablet Take 1 tablet by mouth every evening 30 tablet 5    levothyroxine 125 mcg tablet Take 1 tablet by mouth every day 30 tablet 5    lisinopril (ZESTRIL) 5 mg tablet Take 1 tablet by mouth every day 30 tablet 5    Magnesium 100 MG CAPS Take by mouth daily      Multiple Vitamin (DAILY VALUE MULTIVITAMIN) TABS Take by mouth      omega-3-acid ethyl esters (LOVAZA) 1 g capsule Take 2 capsules (2 g total) by mouth 2 (two) times a day 360 capsule 1    rosuvastatin (CRESTOR) 20 MG tablet Take 1 tablet by mouth every day 30 tablet 5    albuterol (ProAir HFA) 90 mcg/act inhaler Inhale 2 puffs every 6 (six) hours as needed for wheezing 18 g 1    azithromycin (Zithromax) 250 mg tablet Take 2 tablets (500 mg total) by mouth daily for 1 day, THEN 1 tablet (250 mg total) daily for 4 days  6 tablet 0    fluticasone (FLONASE) 50 mcg/act nasal spray 2 sprays into each nostril daily (Patient taking differently: 2 sprays into each nostril 2 (two) times a day as needed ) 1 Bottle 5    methylPREDNISolone 4 MG tablet therapy pack Use as directed on package 21 each 0     No current facility-administered medications for this visit  Current Outpatient Medications on File Prior to Visit   Medication Sig    Cholecalciferol (VITAMIN D3) 1000 units CAPS Take by mouth    co-enzyme Q-10 100 mg capsule Take by mouth    fluticasone (FLONASE) 50 mcg/act nasal spray 2 sprays into each nostril daily    levocetirizine (XYZAL) 5 MG tablet Take 1 tablet by mouth every evening    levothyroxine 125 mcg tablet Take 1 tablet by mouth every day    lisinopril (ZESTRIL) 5 mg tablet Take 1 tablet by mouth every day    Magnesium 100 MG CAPS Take by mouth daily    Multiple Vitamin (DAILY VALUE MULTIVITAMIN) TABS Take by mouth    omega-3-acid ethyl esters (LOVAZA) 1 g capsule Take 2 capsules (2 g total) by mouth 2 (two) times a day    rosuvastatin (CRESTOR) 20 MG tablet Take 1 tablet by mouth every day    fluticasone (FLONASE) 50 mcg/act nasal spray 2 sprays into each nostril daily (Patient taking differently: 2 sprays into each nostril 2 (two) times a day as needed )     No current facility-administered medications on file prior to visit       He is allergic to dust mite extract and other       Review of Systems   Constitutional: Negative for chills and fever  HENT: Negative  Respiratory: Positive for cough  Negative for shortness of breath  Cardiovascular: Negative for chest pain  Objective:      /84 (BP Location: Right arm, Patient Position: Sitting, Cuff Size: Large)   Pulse 69   Temp (!) 96 9 °F (36 1 °C) (Temporal)   Resp 16   Ht 5' 9" (1 753 m)   Wt 98 4 kg (217 lb)   SpO2 97%   BMI 32 05 kg/m²          Physical Exam  Constitutional:       Appearance: He is obese  HENT:      Head: Normocephalic and atraumatic  Right Ear: Tympanic membrane and ear canal normal       Left Ear: Tympanic membrane and ear canal normal       Mouth/Throat:      Pharynx: No posterior oropharyngeal erythema  Cardiovascular:      Rate and Rhythm: Normal rate and regular rhythm  Pulmonary:      Effort: Pulmonary effort is normal       Breath sounds: Wheezing present  Musculoskeletal:      Cervical back: Neck supple  Lymphadenopathy:      Cervical: No cervical adenopathy  Neurological:      Mental Status: He is alert

## 2022-05-03 ENCOUNTER — CONSULT (OUTPATIENT)
Dept: GASTROENTEROLOGY | Facility: CLINIC | Age: 57
End: 2022-05-03
Payer: COMMERCIAL

## 2022-05-03 ENCOUNTER — TELEPHONE (OUTPATIENT)
Dept: GASTROENTEROLOGY | Facility: CLINIC | Age: 57
End: 2022-05-03

## 2022-05-03 VITALS
DIASTOLIC BLOOD PRESSURE: 86 MMHG | BODY MASS INDEX: 32.58 KG/M2 | HEIGHT: 69 IN | SYSTOLIC BLOOD PRESSURE: 133 MMHG | HEART RATE: 92 BPM | WEIGHT: 220 LBS

## 2022-05-03 DIAGNOSIS — Z86.010 HISTORY OF COLON POLYPS: ICD-10-CM

## 2022-05-03 DIAGNOSIS — Z80.0 FAMILY HISTORY OF COLON CANCER: Primary | ICD-10-CM

## 2022-05-03 PROCEDURE — 1036F TOBACCO NON-USER: CPT | Performed by: INTERNAL MEDICINE

## 2022-05-03 PROCEDURE — 3075F SYST BP GE 130 - 139MM HG: CPT | Performed by: INTERNAL MEDICINE

## 2022-05-03 PROCEDURE — 3008F BODY MASS INDEX DOCD: CPT | Performed by: INTERNAL MEDICINE

## 2022-05-03 PROCEDURE — 3079F DIAST BP 80-89 MM HG: CPT | Performed by: INTERNAL MEDICINE

## 2022-05-03 PROCEDURE — 99214 OFFICE O/P EST MOD 30 MIN: CPT | Performed by: INTERNAL MEDICINE

## 2022-05-03 NOTE — PROGRESS NOTES
[facility] - Outpatient Consultation  Jessica James  49 y o  male MRN: [id number]  Encounter: [id number]          ASSESSMENT AND PLAN:      1  History of colon polyps  Patient presents with history of colon polyp  He had a colonoscopy five years ago  A benign colon polyp was removed  Patient presented for screening colonoscopy  He denies any change in bowel habits or rectal bleeding  Denies any abdominal pain or discomfort  There is no nausea or vomiting  He denies any significant heartburn indigestion     - Ambulatory Referral to Gastroenterology  - Colonoscopy; Future    2  Family history of colon cancer  Patient has strong family history of colon cancer  His grandfather and his mother both had colon cancer  Patient appears to be high risk for colon cancer  A colonoscopy will be scheduled for evaluation and recommendations  He is high risk and screening colonoscopy will be done as high risk colon cancer  - Colonoscopy; Future    ______________________________________________________________________    HPI:  Patient denies any abdominal pain or discomfort  There is no change in bowel habits  There is no rectal bleeding or mucus per rectum  He denies any chest pain, cough or wheezing  There is no palpitation, orthopnea or exertional dyspnea  There is no dysuria hematuria  There is no history of stroke, CVA or seizures  REVIEW OF SYSTEMS:    CONSTITUTIONAL: Denies any fever, chills, rigors, and weight loss  HEENT: No earache or tinnitus  Denies hearing loss or visual disturbances  CARDIOVASCULAR: No chest pain or palpitations  RESPIRATORY: Denies any cough, hemoptysis, shortness of breath or dyspnea on exertion  GASTROINTESTINAL: As noted in the History of Present Illness  GENITOURINARY: No problems with urination  Denies any hematuria or dysuria  NEUROLOGIC: No dizziness or vertigo, denies headaches  MUSCULOSKELETAL: Denies any muscle or joint pain  SKIN: Denies skin rashes or itching  ENDOCRINE: Denies excessive thirst  Denies intolerance to heat or cold  PSYCHOSOCIAL: Denies depression or anxiety  Denies any recent memory loss  Historical Information   Past Medical History:   Diagnosis Date    Chronic sinusitis     LAST ASSESSED: [id number]    Closed fracture of lower jaw bone (HCC)     Colon polyp     COVID     January 2022    Hyperlipidemia     Seasonal allergies     Sleep apnea     Doesn't require cpap post UPP surgery     Past Surgical History:   Procedure Laterality Date    COLONOSCOPY      HERNIA REPAIR      MANDIBLE FRACTURE SURGERY      as a child    MS COLONOSCOPY FLX DX W/COLLJ SPEC WHEN PFRMD N/A 2/20/2017    Procedure: COLONOSCOPY;  Surgeon: Dov Ayala MD;  [facility];   Service: Gastroenterology    MS REINSERT /[facility]  8/31/2017    Procedure: RIGHT DISTAL BICEPS TENDON REPAIR;  Surgeon: Mele Cavanaugh MD;  [facility];  Service: Orthopedics    MS REPAIR UMBILICAL XAXN,6+N/R,CTLWZ N/A 11/4/2021    Procedure: UMBILICAL HERNIA REPAIR;  Surgeon: Brittani Robbins MD;  [facility];  Service: General    SINUS SURGERY      TONSILLECTOMY      TONSILLECTOMY AND ADENOIDECTOMY      UVULOPALATOPHARYNGOPLASTY      WISDOM TOOTH EXTRACTION       Social History   Social History     Substance and Sexual Activity   Alcohol Use Yes    Comment: social, events or weekends     Social History     Substance and Sexual Activity   Drug Use No     Social History     Tobacco Use   Smoking Status Never Smoker   Smokeless Tobacco Never Used     Family History   Problem Relation Age of Onset    Colon cancer Maternal Grandfather     Diabetes Mother     Hypertension Mother     Cancer Mother     Heart attack Father 48        ACUTE MI    Cardiomyopathy Father     Heart disease Father     Hypertension Brother     Stroke Paternal Grandfather         SYNDROME    Drug abuse Brother     Anuerysm Neg Hx     Clotting disorder Neg Hx     Heart failure Neg Hx     Coronary artery disease Neg Hx     Sudden death Neg Hx         scd       Meds/Allergies       Current Outpatient Medications:     albuterol (ProAir HFA) 90 mcg/act inhaler    Cholecalciferol (VITAMIN D3) 1000 units CAPS    co-enzyme Q-10 100 mg capsule    fluticasone (FLONASE) 50 mcg/act nasal spray    levocetirizine (XYZAL) 5 MG tablet    levothyroxine 125 mcg tablet    lisinopril (ZESTRIL) 5 mg tablet    Magnesium 100 MG CAPS    Multiple Vitamin (DAILY VALUE MULTIVITAMIN) TABS    omega-3-acid ethyl esters (LOVAZA) 1 g capsule    rosuvastatin (CRESTOR) 20 MG tablet    fluticasone (FLONASE) 50 mcg/act nasal spray    methylPREDNISolone 4 MG tablet therapy pack    Allergies   Allergen Reactions    Dust Mite Extract Sneezing     --sinus infection     Other Other (See Comments)     Annotation - [id number]: Hayfever-sinus infection            Objective     Blood pressure 133/86, pulse 92, height 5' 9" (1 753 m), weight 99 8 kg (220 lb)  Body mass index is 32 49 kg/m²  PHYSICAL EXAM:      General Appearance:   Alert, cooperative, no distress   HEENT:   Normocephalic, atraumatic, anicteric      Neck:  Supple, symmetrical, trachea midline   Lungs:   Clear to auscultation bilaterally; no rales, rhonchi or wheezing; respirations unlabored    Heart[de-identified]   Regular rate and rhythm; no murmur, rub, or gallop  Abdomen:   Soft, non-tender, non-distended; normal bowel sounds; no masses, no organomegaly    Genitalia:   Deferred    Rectal:   Deferred    Extremities:  No cyanosis, clubbing or edema    Pulses:  2+ and symmetric    Skin:  No jaundice, rashes, or lesions    Lymph nodes:  No palpable cervical lymphadenopathy        Lab Results:   No visits with results within 1 Day(s) from this visit     Latest known visit with results is:   Orders Only on 12/10/2021   Component Date Value    TSH 12/10/2021 1 930          Radiology Results:   No results found

## 2022-05-03 NOTE — TELEPHONE ENCOUNTER
Scheduled date of colonoscopy (as of today): 6/6/22  Physician performing colonoscopy:Lidia  Location of colonoscopy: [facility]  Bowel prep reviewed with patient:[facility] / [place] [facility] [place]  Instructions reviewed with patient by: Cynthia Salt  Clearances: None

## 2022-05-19 DIAGNOSIS — J30.89 NON-SEASONAL ALLERGIC RHINITIS, UNSPECIFIED TRIGGER: Primary | ICD-10-CM

## 2022-05-19 RX ORDER — FLUTICASONE PROPIONATE 50 MCG
SPRAY, SUSPENSION (ML) NASAL
Qty: 16 ML | Refills: 5 | Status: SHIPPED | OUTPATIENT
Start: 2022-05-19

## 2022-05-27 ENCOUNTER — TELEPHONE (OUTPATIENT)
Dept: GASTROENTEROLOGY | Facility: CLINIC | Age: 57
End: 2022-05-27

## 2022-05-27 NOTE — TELEPHONE ENCOUNTER
Spoke to pt confirming his colonoscopy scheduled on 6/6/22 with Dr Asya Patrick at [facility]  I informed that [facility] would be calling him 1-2 days prior with arrival time  I informed pt of clear liquid diet day prior as well as doing the bowel cleansing preparation  I also informed that pt will need a  the day of the procedure due to being under sedation  Pt said that he thinks he has instructions, but has to check  He will call back if he has questions

## 2022-06-06 ENCOUNTER — HOSPITAL ENCOUNTER (OUTPATIENT)
Dept: GASTROENTEROLOGY | Facility: AMBULATORY SURGERY CENTER | Age: 57
Discharge: HOME | End: 2022-06-06
Payer: COMMERCIAL

## 2022-06-06 ENCOUNTER — ANESTHESIA (OUTPATIENT)
Dept: GASTROENTEROLOGY | Facility: AMBULATORY SURGERY CENTER | Age: 57
End: 2022-06-06

## 2022-06-06 ENCOUNTER — ANESTHESIA EVENT (OUTPATIENT)
Dept: GASTROENTEROLOGY | Facility: AMBULATORY SURGERY CENTER | Age: 57
End: 2022-06-06

## 2022-06-06 VITALS
HEART RATE: 64 BPM | TEMPERATURE: 98.8 F | SYSTOLIC BLOOD PRESSURE: 113 MMHG | BODY MASS INDEX: 31.25 KG/M2 | WEIGHT: 211 LBS | HEIGHT: 69 IN | RESPIRATION RATE: 18 BRPM | OXYGEN SATURATION: 98 % | DIASTOLIC BLOOD PRESSURE: 72 MMHG

## 2022-06-06 DIAGNOSIS — Z80.0 FAMILY HISTORY OF COLON CANCER: ICD-10-CM

## 2022-06-06 DIAGNOSIS — Z86.010 HISTORY OF COLON POLYPS: ICD-10-CM

## 2022-06-06 PROCEDURE — G0105 COLORECTAL SCRN; HI RISK IND: HCPCS | Performed by: INTERNAL MEDICINE

## 2022-06-06 RX ORDER — LIDOCAINE HYDROCHLORIDE 10 MG/ML
INJECTION, SOLUTION EPIDURAL; INFILTRATION; INTRACAUDAL; PERINEURAL AS NEEDED
Status: DISCONTINUED | OUTPATIENT
Start: 2022-06-06 | End: 2022-06-06

## 2022-06-06 RX ORDER — PROPOFOL 10 MG/ML
INJECTION, EMULSION INTRAVENOUS AS NEEDED
Status: DISCONTINUED | OUTPATIENT
Start: 2022-06-06 | End: 2022-06-06

## 2022-06-06 RX ORDER — SODIUM CHLORIDE 9 MG/ML
100 INJECTION, SOLUTION INTRAVENOUS CONTINUOUS
Status: DISCONTINUED | OUTPATIENT
Start: 2022-06-06 | End: 2022-06-10 | Stop reason: HOSPADM

## 2022-06-06 RX ORDER — SODIUM CHLORIDE 9 MG/ML
INJECTION, SOLUTION INTRAVENOUS CONTINUOUS PRN
Status: DISCONTINUED | OUTPATIENT
Start: 2022-06-06 | End: 2022-06-06

## 2022-06-06 RX ADMIN — LIDOCAINE HYDROCHLORIDE 50 MG: 10 INJECTION, SOLUTION EPIDURAL; INFILTRATION; INTRACAUDAL; PERINEURAL at 12:20

## 2022-06-06 RX ADMIN — PROPOFOL 50 MG: 10 INJECTION, EMULSION INTRAVENOUS at 12:39

## 2022-06-06 RX ADMIN — SODIUM CHLORIDE: 9 INJECTION, SOLUTION INTRAVENOUS at 12:10

## 2022-06-06 RX ADMIN — PROPOFOL 100 MG: 10 INJECTION, EMULSION INTRAVENOUS at 12:20

## 2022-06-06 RX ADMIN — PROPOFOL 50 MG: 10 INJECTION, EMULSION INTRAVENOUS at 12:22

## 2022-06-06 RX ADMIN — PROPOFOL 50 MG: 10 INJECTION, EMULSION INTRAVENOUS at 12:25

## 2022-06-06 RX ADMIN — PROPOFOL 50 MG: 10 INJECTION, EMULSION INTRAVENOUS at 12:21

## 2022-06-06 RX ADMIN — PROPOFOL 50 MG: 10 INJECTION, EMULSION INTRAVENOUS at 12:28

## 2022-06-06 NOTE — ANESTHESIA PREPROCEDURE EVALUATION
Procedure:  COLONOSCOPY    Relevant Problems   CARDIO   (+) Benign essential hypertension   (+) Essential hypertriglyceridemia   (+) Hyperlipidemia      ENDO   (+) Hypothyroidism      MUSCULOSKELETAL   (+) Patellar tendinitis of left knee      Musculoskeletal and Integument   (+) Psoriasis      Other   (+) Chronic pain of left knee   (+) Umbilical hernia without obstruction and without gangrene      Lab Results   Component Value Date     11/24/2017    SODIUM 141 07/07/2021    K 4 4 07/07/2021     07/07/2021    CO2 27 07/07/2021    ANIONGAP 8 01/04/2015    BUN 15 07/07/2021    CREATININE 1 07 07/07/2021    GLUC 105 (H) 07/07/2021    CALCIUM 9 8 11/24/2017    AST 26 07/07/2021    ALT 26 07/07/2021    ALKPHOS 76 11/24/2017    PROT 7 2 11/24/2017    TP 6 6 07/07/2021    BILITOT 0 7 11/24/2017    TBILI 0 4 07/07/2021     Lab Results   Component Value Date    WBC 6 4 10/05/2020    HGB 13 9 10/05/2020    HCT 41 6 10/05/2020    MCV 91 10/05/2020     10/05/2020       Physical Exam    Airway    Mallampati score: II  TM Distance: >3 FB  Neck ROM: full     Dental   No notable dental hx     Cardiovascular      Pulmonary      Other Findings        Anesthesia Plan  ASA Score- 2     Anesthesia Type- IV sedation with anesthesia with ASA Monitors  Additional Monitors:   Airway Plan:           Plan Factors-Exercise tolerance (METS): >4 METS  Chart reviewed  EKG reviewed  Imaging results reviewed  Existing labs reviewed  Patient summary reviewed  Induction- intravenous  Postoperative Plan- Plan for postoperative opioid use  Informed Consent- Anesthetic plan and risks discussed with patient  I personally reviewed this patient with the CRNA  Discussed and agreed on the Anesthesia Plan with the CRNA  Nickolas Li

## 2022-06-06 NOTE — ANESTHESIA POSTPROCEDURE EVALUATION
Post-Op Assessment Note    CV Status:  Stable  Pain Score: 0    Pain management: adequate     Mental Status:  Arousable   Hydration Status:  Euvolemic   PONV Controlled:  Controlled   Airway Patency:  Patent      Post Op Vitals Reviewed: Yes      Staff: Anesthesiologist, CRNA         No complications documented      BP   107/61   Temp      Pulse 69   Resp   14   SpO2   96

## 2022-06-06 NOTE — DISCHARGE SUMMARY
Discharge Summary - Gregg Guzman  49 y o  male MRN: [id number]    Unit/Bed#:  Encounter: [id number]    Admission Date:  06/06/2022    Admitting Diagnosis: History of colon polyps [Z86 010]  Family history of colon cancer [Z80 0]    HPI:  History of colon polyp family history of colon cancer    Procedures Performed: No orders of the defined types were placed in this encounter  Summary of Hospital Course: Tolerated procedure well    Significant Findings, Care, Treatment and Services Provided:  Mild diverticulosis and hemorrhoid noted  Polyp not seen  Complications:  None    Discharge Diagnosis:  See above    Medical Problems             Resolved Problems  Date Reviewed: 3/21/2022   None                 Condition at Discharge: good         Discharge instructions/Information to patient and family:   See after visit summary for information provided to patient and family  Provisions for Follow-Up Care:  See after visit summary for information related to follow-up care and any pertinent home health orders        PCP: Waleska Gant DO    Disposition: Home

## 2022-07-30 LAB
ALBUMIN SERPL-MCNC: 4.9 G/DL (ref 3.8–4.9)
ALBUMIN/GLOB SERPL: 2.9 {RATIO} (ref 1.2–2.2)
ALP SERPL-CCNC: 62 IU/L (ref 44–121)
ALT SERPL-CCNC: 24 IU/L (ref 0–44)
AST SERPL-CCNC: 21 IU/L (ref 0–40)
BILIRUB SERPL-MCNC: 0.6 MG/DL (ref 0–1.2)
BUN SERPL-MCNC: 15 MG/DL (ref 6–24)
BUN/CREAT SERPL: 13 (ref 9–20)
CALCIUM SERPL-MCNC: 9.6 MG/DL (ref 8.7–10.2)
CHLORIDE SERPL-SCNC: 103 MMOL/L (ref 96–106)
CHOLEST SERPL-MCNC: 130 MG/DL (ref 100–199)
CO2 SERPL-SCNC: 25 MMOL/L (ref 20–29)
CREAT SERPL-MCNC: 1.14 MG/DL (ref 0.76–1.27)
EGFR: 76 ML/MIN/1.73
GLOBULIN SER-MCNC: 1.7 G/DL (ref 1.5–4.5)
GLUCOSE SERPL-MCNC: 95 MG/DL (ref 65–99)
HBA1C MFR BLD: 5.4 % (ref 4.8–5.6)
HDLC SERPL-MCNC: 33 MG/DL
LDLC SERPL CALC-MCNC: 74 MG/DL (ref 0–99)
POTASSIUM SERPL-SCNC: 4.5 MMOL/L (ref 3.5–5.2)
PROT SERPL-MCNC: 6.6 G/DL (ref 6–8.5)
SL AMB VLDL CHOLESTEROL CALC: 23 MG/DL (ref 5–40)
SODIUM SERPL-SCNC: 141 MMOL/L (ref 134–144)
TRIGL SERPL-MCNC: 125 MG/DL (ref 0–149)
TSH SERPL DL<=0.005 MIU/L-ACNC: 0.68 UIU/ML (ref 0.45–4.5)

## 2022-08-12 ENCOUNTER — OFFICE VISIT (OUTPATIENT)
Dept: URGENT CARE | Age: 57
End: 2022-08-12
Payer: COMMERCIAL

## 2022-08-12 VITALS
HEART RATE: 70 BPM | SYSTOLIC BLOOD PRESSURE: 132 MMHG | RESPIRATION RATE: 18 BRPM | DIASTOLIC BLOOD PRESSURE: 80 MMHG | TEMPERATURE: 98 F

## 2022-08-12 DIAGNOSIS — S61.012A LACERATION WITHOUT FOREIGN BODY OF LEFT THUMB WITHOUT DAMAGE TO NAIL, INITIAL ENCOUNTER: Primary | ICD-10-CM

## 2022-08-12 PROCEDURE — 99213 OFFICE O/P EST LOW 20 MIN: CPT

## 2022-08-12 PROCEDURE — 90715 TDAP VACCINE 7 YRS/> IM: CPT

## 2022-08-12 RX ORDER — SULFAMETHOXAZOLE AND TRIMETHOPRIM 800; 160 MG/1; MG/1
1 TABLET ORAL EVERY 12 HOURS SCHEDULED
Qty: 10 TABLET | Refills: 0 | Status: SHIPPED | OUTPATIENT
Start: 2022-08-12 | End: 2022-08-17

## 2022-08-12 NOTE — PATIENT INSTRUCTIONS
Please take antibiotics once in the morning and once at night for the next 5 days  1   Drink plenty fluids  2   Take probiotics [i e  Yogurt, Acidophilus, Florastor (liquid)] daily  3   Over-the-counter medications as needed for symptomatic care  4    Advance activities as tolerated  5    Follow-up with your primary care physician in 3-4 days  6   Go to emergency room if symptoms are worsening      7   Use a humidifier at bedtime

## 2022-08-12 NOTE — PROGRESS NOTES
[facility]        NAME: Drea Box  is a 56 y o  male  : 1965    MRN: [id number]  DATE: 2022  TIME: 1:10 PM    Assessment and Plan   Laceration without foreign body of left thumb without damage to nail, initial encounter [S61 012A]  1  Laceration without foreign body of left thumb without damage to nail, initial encounter  sulfamethoxazole-trimethoprim (BACTRIM DS) 800-160 mg per tablet    Tdap Vaccine greater than or equal to 8yo         Patient Instructions     Antibiotics as discussed  Follow up with PCP in 3-5 days  Proceed to  ER if symptoms worsen  Chief Complaint     Chief Complaint   Patient presents with    Wound Infection     Patient cut his left thumb on  concerned that it may be infected at this time         History of Present Illness       Patient presenting for evaluation of left thumb laceration  Patient states that 5 days ago he was changing his razor blade, when he sustained a laceration to his left thumb  He states that he put over-the-counter skin glue on the area, and has been healing, but he is concerned for infection  He states that there is serous drainage from the area  He also states that there has been redness around the area  He denies any purulent drainage, fevers or chills  Patient states his last tetanus was in   Review of Systems   Review of Systems   Constitutional: Negative for chills and fever  HENT: Negative for ear pain and sore throat  Eyes: Negative for pain and visual disturbance  Respiratory: Negative for cough and shortness of breath  Cardiovascular: Negative for chest pain and palpitations  Gastrointestinal: Negative for abdominal pain and vomiting  Genitourinary: Negative for dysuria and hematuria  Musculoskeletal: Negative for arthralgias and back pain  Skin: Positive for wound  Negative for color change and rash  Neurological: Negative for seizures and syncope     All other systems reviewed and are negative          Current Medications       Current Outpatient Medications:     sulfamethoxazole-trimethoprim (BACTRIM DS) 800-160 mg per tablet, Take 1 tablet by mouth every 12 (twelve) hours for 5 days, Disp: 10 tablet, Rfl: 0    albuterol (ProAir HFA) 90 mcg/act inhaler, Inhale 2 puffs every 6 (six) hours as needed for wheezing, Disp: 18 g, Rfl: 1    Cholecalciferol (VITAMIN D3) 1000 units CAPS, Take by mouth, Disp: , Rfl:     co-enzyme Q-10 100 mg capsule, Take by mouth, Disp: , Rfl:     fluticasone (FLONASE) 50 mcg/act nasal spray, 2 sprays into each nostril daily, Disp: 16 g, Rfl: 3    fluticasone (FLONASE) 50 mcg/act nasal spray, Use 2 sprays in each nostril every day, Disp: 16 mL, Rfl: 5    levocetirizine (XYZAL) 5 MG tablet, Take 1 tablet by mouth every evening, Disp: 30 tablet, Rfl: 5    levothyroxine 125 mcg tablet, Take 1 tablet by mouth 30 minutes before breakfast, Disp: 30 tablet, Rfl: 5    lisinopril (ZESTRIL) 5 mg tablet, Take 1 tablet by mouth every day, Disp: 30 tablet, Rfl: 5    Magnesium 100 MG CAPS, Take by mouth daily, Disp: , Rfl:     methylPREDNISolone 4 MG tablet therapy pack, Use as directed on package (Patient not taking: Reported on 5/3/2022 ), Disp: 21 each, Rfl: 0    Multiple Vitamin (DAILY VALUE MULTIVITAMIN) TABS, Take by mouth, Disp: , Rfl:     omega-3-acid ethyl esters (LOVAZA) 1 g capsule, Take 2 capsules (2 g total) by mouth 2 (two) times a day, Disp: 360 capsule, Rfl: 1    rosuvastatin (CRESTOR) 20 MG tablet, Take 1 tablet by mouth every day, Disp: 30 tablet, Rfl: 5    Current Allergies     Allergies as of 08/12/2022 - Reviewed 08/12/2022   Allergen Reaction Noted    Dust mite extract Sneezing 06/13/2014    Other Other (See Comments) 06/13/2014            The following portions of the patient's history were reviewed and updated as appropriate: allergies, current medications, past family history, past medical history, past social history, past surgical history and problem list      Past Medical History:   Diagnosis Date    Chronic sinusitis     LAST ASSESSED: [id number]    Closed fracture of lower jaw bone (HCC)     Colon polyp     COVID     January 2022    Disease of thyroid gland     Hyperlipidemia     Hypertension     Seasonal allergies     Seasonal allergies     Sleep apnea     Doesn't require cpap post UPP surgery       Past Surgical History:   Procedure Laterality Date    COLONOSCOPY      HERNIA REPAIR      MANDIBLE FRACTURE SURGERY      as a child    IA COLONOSCOPY FLX DX W/COLLJ SPEC WHEN PFRMD N/A 2/20/2017    Procedure: COLONOSCOPY;  Surgeon: Lan Rosa MD;  [facility]; Service: Gastroenterology    IA REINSERT BI/TR[facility]PS [facility]  8/31/2017    Procedure: RIGHT DISTAL BICEPS TENDON REPAIR;  Surgeon: Gwendolyn Alvarez MD;  [facility];  Service: Orthopedics    IA REPAIR UMBILICAL EIDA,0+H/I,QTPIV N/A 11/4/2021    Procedure: UMBILICAL HERNIA REPAIR;  Surgeon: June Ellison MD;  [facility];  Service: General    SINUS SURGERY      TONSILLECTOMY      TONSILLECTOMY AND ADENOIDECTOMY      UVULOPALATOPHARYNGOPLASTY      WISDOM TOOTH EXTRACTION         Family History   Problem Relation Age of Onset    Diabetes Mother     Hypertension Mother     Cancer Mother     Heart attack Father 48        ACUTE MI    Cardiomyopathy Father     Heart disease Father     Hypertension Brother     Drug abuse Brother     Cancer Maternal Grandfather     Colon cancer Maternal Grandfather     Stroke Paternal Grandfather         SYNDROME    Anuerysm Neg Hx     Clotting disorder Neg Hx     Heart failure Neg Hx     Coronary artery disease Neg Hx     Sudden death Neg Hx         scd         Medications have been verified          Objective   /80 (BP Location: Right arm, Patient Position: Sitting, Cuff Size: Standard)   Pulse 70   Temp 98 °F (36 7 °C)   Resp 18        Physical Exam     Physical Exam  Vitals and nursing note reviewed  Constitutional:       General: He is not in acute distress  Appearance: Normal appearance  He is normal weight  He is not ill-appearing, toxic-appearing or diaphoretic  HENT:      Head: Normocephalic and atraumatic  Right Ear: Tympanic membrane normal       Left Ear: Tympanic membrane normal       Nose: Nose normal  No congestion or rhinorrhea  Mouth/Throat:      Mouth: Mucous membranes are moist       Pharynx: Oropharynx is clear  No oropharyngeal exudate or posterior oropharyngeal erythema  Eyes:      General:         Right eye: No discharge  Left eye: No discharge  Extraocular Movements: Extraocular movements intact  Conjunctiva/sclera: Conjunctivae normal       Pupils: Pupils are equal, round, and reactive to light  Cardiovascular:      Rate and Rhythm: Normal rate and regular rhythm  Pulses: Normal pulses  Heart sounds: Normal heart sounds  No murmur heard  No friction rub  No gallop  Pulmonary:      Effort: Pulmonary effort is normal  No respiratory distress  Breath sounds: Normal breath sounds  No stridor  No wheezing, rhonchi or rales  Abdominal:      General: Abdomen is flat  Bowel sounds are normal       Palpations: Abdomen is soft  Tenderness: There is no abdominal tenderness  There is no guarding or rebound  Musculoskeletal:         General: No tenderness  Normal range of motion  Cervical back: Normal range of motion and neck supple  Skin:     General: Skin is warm and dry  Capillary Refill: Capillary refill takes less than 2 seconds  Neurological:      General: No focal deficit present  Mental Status: He is alert and oriented to person, place, and time     Psychiatric:         Mood and Affect: Mood normal          Behavior: Behavior normal

## 2022-08-28 DIAGNOSIS — I10 BENIGN ESSENTIAL HYPERTENSION: ICD-10-CM

## 2022-08-28 DIAGNOSIS — E78.1 ESSENTIAL HYPERTRIGLYCERIDEMIA: ICD-10-CM

## 2022-08-28 DIAGNOSIS — J30.1 SEASONAL ALLERGIC RHINITIS DUE TO POLLEN: ICD-10-CM

## 2022-08-29 RX ORDER — ROSUVASTATIN CALCIUM 20 MG/1
TABLET, COATED ORAL
Qty: 30 TABLET | Refills: 5 | Status: SHIPPED | OUTPATIENT
Start: 2022-08-29

## 2022-08-29 RX ORDER — LEVOCETIRIZINE DIHYDROCHLORIDE 5 MG/1
TABLET, FILM COATED ORAL
Qty: 30 TABLET | Refills: 5 | Status: SHIPPED | OUTPATIENT
Start: 2022-08-29

## 2022-08-29 RX ORDER — LISINOPRIL 5 MG/1
TABLET ORAL
Qty: 30 TABLET | Refills: 5 | Status: SHIPPED | OUTPATIENT
Start: 2022-08-29

## 2022-11-01 ENCOUNTER — TELEPHONE (OUTPATIENT)
Dept: CARDIOLOGY CLINIC | Facility: CLINIC | Age: 57
End: 2022-11-01

## 2022-11-01 NOTE — TELEPHONE ENCOUNTER
P/C had bw back in July through PCP, labs per PCP were good    Would you want bw prior to appt in Dec    Please advise

## 2022-11-11 ENCOUNTER — OFFICE VISIT (OUTPATIENT)
Dept: FAMILY MEDICINE CLINIC | Facility: CLINIC | Age: 57
End: 2022-11-11

## 2022-11-11 VITALS
HEART RATE: 74 BPM | OXYGEN SATURATION: 98 % | WEIGHT: 198 LBS | BODY MASS INDEX: 29.33 KG/M2 | SYSTOLIC BLOOD PRESSURE: 106 MMHG | TEMPERATURE: 97.6 F | RESPIRATION RATE: 16 BRPM | HEIGHT: 69 IN | DIASTOLIC BLOOD PRESSURE: 72 MMHG

## 2022-11-11 DIAGNOSIS — Z00.00 ANNUAL PHYSICAL EXAM: Primary | ICD-10-CM

## 2022-11-11 DIAGNOSIS — E03.8 OTHER SPECIFIED HYPOTHYROIDISM: ICD-10-CM

## 2022-11-11 NOTE — ASSESSMENT & PLAN NOTE
Physical exam completed at this time  Lab work reviewed  Pt will go to therapy on his own for lateral and medial epicondylitis  Continue current diet, slowly increase exercise as tolerated  Flu shot of her, patient declines at this time

## 2022-11-11 NOTE — PROGRESS NOTES
[facility]    NAME: Arnaldo Vasquez  AGE: 56 y o  SEX: male  : 1965     DATE: 2022     Assessment and Plan:     Problem List Items Addressed This Visit        Endocrine    Hypothyroidism    Relevant Orders    TSH, 3rd generation with Free T4 reflex       Other    Annual physical exam - Primary     Physical exam completed at this time  Lab work reviewed  Pt will go to therapy on his own for lateral and medial epicondylitis  Continue current diet, slowly increase exercise as tolerated  Flu shot of her, patient declines at this time               Immunizations and preventive care screenings were discussed with patient today  Appropriate education was printed on patient's after visit summary  Discussed risks and benefits of prostate cancer screening  We discussed the controversial history of PSA screening for prostate cancer in the [place] as well as the risk of over detection and over treatment of prostate cancer by way of PSA screening  The patient understands that PSA blood testing is an imperfect way to screen for prostate cancer and that elevated PSA levels in the blood may also be caused by infection, inflammation, prostatic trauma or manipulation, urological procedures, or by benign prostatic enlargement  The role of the digital rectal examination in prostate cancer screening was also discussed and I discussed with him that there is large interobserver variability in the findings of digital rectal examination  Counseling:  Alcohol/drug use: discussed moderation in alcohol intake, the recommendations for healthy alcohol use, and avoidance of illicit drug use  Dental Health: discussed importance of regular tooth brushing, flossing, and dental visits  Injury prevention: discussed safety/seat belts, safety helmets, smoke detectors, carbon dioxide detectors, and smoking near bedding or upholstery    Sexual health: discussed sexually transmitted diseases, partner selection, use of condoms, avoidance of unintended pregnancy, and contraceptive alternatives  · Exercise: the importance of regular exercise/physical activity was discussed  Recommend exercise 3-5 times per week for at least 30 minutes  No follow-ups on file  Chief Complaint:     Chief Complaint   Patient presents with   • Follow-up     6 mo ck      History of Present Illness:     Adult Annual Physical   Patient here for a comprehensive physical exam  The patient reports no problems  Diet and Physical Activity  · Diet/Nutrition: cutting down portion size, increased vegetable intake  · Exercise: had a rowing machine  had some lateral and medial epicondylitis  Depression Screening  PHQ-2/9 Depression Screening    Little interest or pleasure in doing things: 0 - not at all  Feeling down, depressed, or hopeless: 0 - not at all  PHQ-2 Score: 0  PHQ-2 Interpretation: Negative depression screen       General Health  · Sleep: sleeps well  · Hearing: normal - bilateral   · Vision: wears glasses  · Dental: regular dental visits and brushes teeth once daily   Health  · Symptoms include: none     Review of Systems:     Review of Systems   Constitutional: Negative for chills and fever  HENT: Negative for congestion, rhinorrhea and sore throat  Respiratory: Negative for cough and shortness of breath  Cardiovascular: Negative for chest pain  Gastrointestinal: Negative for abdominal pain, constipation, diarrhea, nausea and vomiting  Genitourinary: Negative for dysuria and penile pain  Musculoskeletal: Positive for arthralgias  Negative for back pain and joint swelling  Skin: Negative for rash  Neurological: Negative for dizziness, light-headedness and headaches  Psychiatric/Behavioral: Negative for sleep disturbance             Past Medical History:     Past Medical History:   Diagnosis Date   • Chronic sinusitis     LAST ASSESSED: [id number]   • Closed fracture of lower jaw bone (HCC)    • Colon polyp    • COVID     January 2022   • Disease of thyroid gland    • Hyperlipidemia    • Hypertension    • Seasonal allergies    • Seasonal allergies    • Sleep apnea     Doesn't require cpap post UPP surgery      Past Surgical History:     Past Surgical History:   Procedure Laterality Date   • COLONOSCOPY     • HERNIA REPAIR     • MANDIBLE FRACTURE SURGERY      as a child   • IA COLONOSCOPY FLX DX W/COLLJ SPEC WHEN PFRMD N/A 2/20/2017    Procedure: COLONOSCOPY;  Surgeon: Karolyn White MD;  [facility];   Service: Gastroenterology   • IA REINSERT BI/TRICEPS TENDON,DISTAL  8/31/2017    Procedure: RIGHT DISTAL BICEPS TENDON REPAIR;  Surgeon: Blu Blanton MD;  [facility];  Service: Orthopedics   • IA REPAIR UMBILICAL JLXH,8+M/L,ISAKR N/A 11/4/2021    Procedure: UMBILICAL HERNIA REPAIR;  Surgeon: Ofelia Grove MD;  [facility];  Service: General   • SINUS SURGERY     • TONSILLECTOMY     • TONSILLECTOMY AND ADENOIDECTOMY     • UVULOPALATOPHARYNGOPLASTY     • WISDOM TOOTH EXTRACTION        Family History:     Family History   Problem Relation Age of Onset   • Diabetes Mother    • Hypertension Mother    • Cancer Mother    • Heart attack Father 48        ACUTE MI   • Cardiomyopathy Father    • Heart disease Father    • Hypertension Brother    • Drug abuse Brother    • Cancer Maternal Grandfather    • Colon cancer Maternal Grandfather    • Stroke Paternal Grandfather         SYNDROME   • Anuerysm Neg Hx    • Clotting disorder Neg Hx    • Heart failure Neg Hx    • Coronary artery disease Neg Hx    • Sudden death Neg Hx         scd      Social History:     Social History     Socioeconomic History   • Marital status: /Civil Union     Spouse name: None   • Number of children: None   • Years of education: None   • Highest education level: None   Occupational History   • None   Tobacco Use   • Smoking status: Never Smoker   • Smokeless tobacco: Never Used   Vaping Use   • Vaping Use: Never used   Substance and Sexual Activity   • Alcohol use: Yes     Comment: social, events or weekends   • Drug use: No   • Sexual activity: Yes   Other Topics Concern   • None   Social History Narrative   • None     Social Determinants of Health     Financial Resource Strain: Not on file   Food Insecurity: Not on file   Transportation Needs: Not on file   Physical Activity: Not on file   Stress: Not on file   Social Connections: Not on file   Intimate Partner Violence: Not on file   Housing Stability: Not on file      Current Medications:     Current Outpatient Medications   Medication Sig Dispense Refill   • Cholecalciferol (VITAMIN D3) 1000 units CAPS Take by mouth     • co-enzyme Q-10 100 mg capsule Take by mouth     • fluticasone (FLONASE) 50 mcg/act nasal spray 2 sprays into each nostril daily 16 g 3   • levocetirizine (XYZAL) 5 MG tablet Take 1 tablet by mouth every evening 30 tablet 5   • levothyroxine 125 mcg tablet Take 1 tablet by mouth 30 minutes before breakfast 30 tablet 5   • lisinopril (ZESTRIL) 5 mg tablet Take 1 tablet by mouth every day 30 tablet 5   • Magnesium 100 MG CAPS Take by mouth daily     • Multiple Vitamin (DAILY VALUE MULTIVITAMIN) TABS Take by mouth     • omega-3-acid ethyl esters (LOVAZA) 1 g capsule Take 2 capsules (2 g total) by mouth 2 (two) times a day 360 capsule 1   • rosuvastatin (CRESTOR) 20 MG tablet Take 1 tablet by mouth every day 30 tablet 5   • albuterol (ProAir HFA) 90 mcg/act inhaler Inhale 2 puffs every 6 (six) hours as needed for wheezing 18 g 1   • fluticasone (FLONASE) 50 mcg/act nasal spray Use 2 sprays in each nostril every day 16 mL 5     No current facility-administered medications for this visit  Allergies:      Allergies   Allergen Reactions   • Dust Mite Extract Sneezing     --sinus infection    • Other Other (See Comments)     Annotation - [id number]: Hayfever-sinus infection Physical Exam:     /72 (BP Location: Right arm, Patient Position: Sitting, Cuff Size: Large)   Pulse 74   Temp 97 6 °F (36 4 °C) (Temporal)   Resp 16   Ht 5' 9" (1 753 m)   Wt 89 8 kg (198 lb)   SpO2 98%   BMI 29 24 kg/m²     Physical Exam  Vitals reviewed  Constitutional:       General: He is not in acute distress  Appearance: Normal appearance  He is not ill-appearing, toxic-appearing or diaphoretic  HENT:      Head: Normocephalic  Right Ear: Tympanic membrane normal       Left Ear: Tympanic membrane normal    Cardiovascular:      Rate and Rhythm: Normal rate and regular rhythm  Pulses: Normal pulses  Heart sounds: Normal heart sounds  No murmur heard  Pulmonary:      Effort: Pulmonary effort is normal  No respiratory distress  Breath sounds: Normal breath sounds  Abdominal:      General: Abdomen is flat  Bowel sounds are normal       Palpations: Abdomen is soft  Musculoskeletal:         General: No swelling, deformity or signs of injury  Normal range of motion  Comments: Mild tenderness over the lateral medial epicondyle   Skin:     General: Skin is warm and dry  Capillary Refill: Capillary refill takes less than 2 seconds  Coloration: Skin is not jaundiced  Neurological:      General: No focal deficit present  Mental Status: He is alert     Psychiatric:         Mood and Affect: Mood normal           Preston Rai MD  [facility]

## 2022-11-11 NOTE — PATIENT INSTRUCTIONS

## 2022-11-26 DIAGNOSIS — J30.1 SEASONAL ALLERGIC RHINITIS DUE TO POLLEN: Primary | ICD-10-CM

## 2022-11-28 RX ORDER — FLUTICASONE PROPIONATE 50 MCG
SPRAY, SUSPENSION (ML) NASAL
Qty: 16 G | Refills: 5 | Status: SHIPPED | OUTPATIENT
Start: 2022-11-28

## 2022-12-09 ENCOUNTER — OFFICE VISIT (OUTPATIENT)
Dept: CARDIOLOGY CLINIC | Facility: CLINIC | Age: 57
End: 2022-12-09

## 2022-12-09 VITALS
HEIGHT: 69 IN | WEIGHT: 193.4 LBS | HEART RATE: 77 BPM | OXYGEN SATURATION: 96 % | SYSTOLIC BLOOD PRESSURE: 112 MMHG | BODY MASS INDEX: 28.64 KG/M2 | DIASTOLIC BLOOD PRESSURE: 80 MMHG

## 2022-12-09 DIAGNOSIS — I10 BENIGN ESSENTIAL HYPERTENSION: Primary | ICD-10-CM

## 2022-12-09 DIAGNOSIS — E78.5 HYPERLIPIDEMIA, UNSPECIFIED HYPERLIPIDEMIA TYPE: ICD-10-CM

## 2022-12-09 DIAGNOSIS — Z82.49 FAMILY HISTORY OF MYOCARDIAL INFARCTION: ICD-10-CM

## 2022-12-09 DIAGNOSIS — E78.1 ESSENTIAL HYPERTRIGLYCERIDEMIA: ICD-10-CM

## 2022-12-09 RX ORDER — TIRZEPATIDE 10 MG/.5ML
INJECTION, SOLUTION SUBCUTANEOUS
COMMUNITY
Start: 2022-12-07

## 2022-12-09 NOTE — PROGRESS NOTES
[facility]  Office followup  Asya Walsh  56 y o  male MRN: [id number]        Problems    1  Benign essential hypertension  POCT ECG    Basic metabolic panel      2  Essential hypertriglyceridemia  Lipid panel      3  Hyperlipidemia, unspecified hyperlipidemia type  Lipid panel    Aldolase    CK (with reflex to MB)      4  Family history of myocardial infarction  Stress test only, exercise          Impression:    Serene Spatz returns to see me for primary prevention of CAD/MI with risk factors as below  Hypertension  Not well controlled in the past, has been on lisinopril 5 mg, has lost 27 pounds intentionally, and is experiencing lightheadedness and low normal blood pressure    Mixed hyperlipidemia  On coenzyme Q10, Lovaza and rosuvastatin  LDL is excellent  Triglycerides are controlled  He is experiencing bilateral thigh discomfort, it comes on randomly, it is nonexertional, he has not had a CPK or aldolase checked in the past   He denies any leg weakness associated    Family history of premature CAD/MI  Normal stress test 11/18  Normal stress test 2020  0 coronary calcium 2020  Continues to experience atypical cardiac symptoms    Plan    Check aldolase, check CPK, ordered lipids for him to do when his weight hits its olivia  Check treadmill stress test for ongoing atypical chest pain and strong family history  6-month follow-up  Discontinue amlodipine    HPI: Asya Walsh  is a 56y o  year old male with a significant family history of premature coronary artery disease/MI, dad requiring transplant for severe ischemic cardiomyopathy, and personally has hypertension, and mixed hyperlipidemia  He is taking a supplement called Mounjaro, appetite suppressant, he is lost 20 pounds, he feels great, he would like to continue losing a bit more  He is trying to focus on small portions, intermittent fasting, and happy with his progress so far    He is experiencing orthostatic lightheadedness, he continues on lisinopril, his blood pressure is low normal   Lipids earlier this year showed improvement in triglycerides down to 125, LDL 74, HDL 33 but he was about 10 to 15 pounds heavier at that time  He is not doing a lot of exercise at this time, he has had some issues with his right elbow which makes exercise difficult  Review of Systems   Constitutional: Negative for appetite change, diaphoresis, fatigue and fever  Respiratory: Negative for chest tightness, shortness of breath and wheezing  Cardiovascular: Negative for chest pain, palpitations and leg swelling  Gastrointestinal: Negative for abdominal pain and blood in stool  Musculoskeletal: Positive for arthralgias (Right elbow)  Negative for joint swelling  Skin: Negative for rash  Neurological: Negative for dizziness, syncope and light-headedness  Past Medical History:   Diagnosis Date   • Chronic sinusitis     LAST ASSESSED: [id number]   • Closed fracture of lower jaw bone ([address] )    • Colon polyp    • COVID     January 2022   • Disease of thyroid gland    • Hyperlipidemia    • Hypertension    • Seasonal allergies    • Seasonal allergies    • Sleep apnea     Doesn't require cpap post UPP surgery     Past Surgical History:   Procedure Laterality Date   • COLONOSCOPY     • HERNIA REPAIR     • MANDIBLE FRACTURE SURGERY      as a child   • CT COLONOSCOPY FLX DX W/COLLJ SPEC WHEN PFRMD N/A 2/20/2017    Procedure: COLONOSCOPY;  Surgeon: Beryle Homme, MD;  [facility];   Service: Gastroenterology   • CT REINSERT BI/TRICEPS TENDON,DISTAL  8/31/2017    Procedure: RIGHT DISTAL BICEPS TENDON REPAIR;  Surgeon: Gustavo Pa MD;  [facility];  Service: Orthopedics   • CT REPAIR UMBILICAL DONG,4+F/I,PFDIM N/A 11/4/2021    Procedure: UMBILICAL HERNIA REPAIR;  Surgeon: Marcio Ram MD;  Location: [facility];  Service: General   • SINUS SURGERY     • TONSILLECTOMY     • TONSILLECTOMY AND ADENOIDECTOMY     • UVULOPALATOPHARYNGOPLASTY     • WISDOM TOOTH EXTRACTION       Social History     Substance and Sexual Activity   Alcohol Use Yes    Comment: social, events or weekends     Social History     Substance and Sexual Activity   Drug Use No     Social History     Tobacco Use   Smoking Status Never   Smokeless Tobacco Never     Family History   Problem Relation Age of Onset   • Diabetes Mother    • Hypertension Mother    • Cancer Mother    • Heart attack Father 48        ACUTE MI   • Cardiomyopathy Father    • Heart disease Father    • Hypertension Brother    • Drug abuse Brother    • Cancer Maternal Grandfather    • Colon cancer Maternal Grandfather    • Stroke Paternal Grandfather         SYNDROME   • Anuerysm Neg Hx    • Clotting disorder Neg Hx    • Heart failure Neg Hx    • Coronary artery disease Neg Hx    • Sudden death Neg Hx         scd       Allergies:   Allergies   Allergen Reactions   • Dust Mite Extract Sneezing     --sinus infection    • Other Other (See Comments)     Annotation - [id number]: Hayfever-sinus infection        Medications:     Current Outpatient Medications:   •  Cholecalciferol (VITAMIN D3) 1000 units CAPS, Take by mouth, Disp: , Rfl:   •  co-enzyme Q-10 100 mg capsule, Take by mouth, Disp: , Rfl:   •  fluticasone (FLONASE) 50 mcg/act nasal spray, Use 2 sprays in each nostril every day, Disp: 16 g, Rfl: 5  •  levocetirizine (XYZAL) 5 MG tablet, Take 1 tablet by mouth every evening, Disp: 30 tablet, Rfl: 5  •  levothyroxine 125 mcg tablet, Take 1 tablet by mouth 30 minutes before breakfast, Disp: 30 tablet, Rfl: 5  •  Magnesium 100 MG CAPS, Take by mouth daily, Disp: , Rfl:   •  Mounjaro 10 MG/0 5ML SOPN, inject 10 MENSTRUAL CYCLE subcutaneously every week for 4 weeks, Disp: , Rfl:   •  Multiple Vitamin (DAILY VALUE MULTIVITAMIN) TABS, Take by mouth, Disp: , Rfl:   •  omega-3-acid ethyl esters (LOVAZA) 1 g capsule, Take 2 capsules (2 g total) by mouth 2 (two) times a day, Disp: 360 capsule, Rfl: 1  •  rosuvastatin (CRESTOR) 20 MG tablet, Take 1 tablet by mouth every day, Disp: 30 tablet, Rfl: 5      Vitals:    12/09/22 0837   BP: 112/80   Pulse: 77   SpO2: 96%     Weight (last 2 days)     Date/Time Weight    12/09/22 0837 87 7 (193 4)        Physical Exam  Constitutional:       General: He is not in acute distress  Appearance: He is not diaphoretic  HENT:      Head: Normocephalic and atraumatic  Eyes:      General: No scleral icterus  Conjunctiva/sclera: Conjunctivae normal    Neck:      Vascular: No JVD  Cardiovascular:      Rate and Rhythm: Normal rate and regular rhythm  Heart sounds: Normal heart sounds  No murmur heard  Pulmonary:      Effort: Pulmonary effort is normal  No respiratory distress  Breath sounds: Normal breath sounds  No decreased breath sounds, wheezing, rhonchi or rales  Musculoskeletal:      Cervical back: Normal range of motion  Right lower leg: Normal  No edema  Left lower leg: Normal  No edema  Skin:     General: Skin is warm and dry  Neurological:      Mental Status: He is alert and oriented to person, place, and time             Laboratory Studies:  Lab Results   Component Value Date    HGBA1C 5 4 07/29/2022    HGBA1C 5 8 (H) 10/28/2021    HGBA1C 5 7 03/01/2021     11/24/2017     05/05/2017     12/16/2016    K 4 5 07/29/2022    K 4 4 07/07/2021    K 4 5 10/05/2020     07/29/2022     07/07/2021     10/05/2020    CO2 25 07/29/2022    CO2 27 07/07/2021    CO2 25 10/05/2020    GLUCOSE 101 (H) 11/24/2017    GLUCOSE 101 (H) 05/05/2017    GLUCOSE 94 12/16/2016    CREATININE 1 14 07/29/2022    CREATININE 1 07 07/07/2021    CREATININE 1 13 10/05/2020    CREATININE 1 10 11/24/2017    CREATININE 1 21 05/05/2017    CREATININE 1 25 12/16/2016    BUN 15 07/29/2022    BUN 15 07/07/2021    BUN 25 (H) 10/05/2020     Lab Results   Component Value Date    WBC 6 4 10/05/2020    WBC 5 73 06/27/2014    RBC 4 59 10/05/2020 RBC 4 72 06/27/2014    HGB 13 9 10/05/2020    HGB 14 0 06/27/2014    HCT 41 6 10/05/2020    HCT 40 9 06/27/2014    MCV 91 10/05/2020    MCV 87 06/27/2014    MCH 30 3 10/05/2020    MCH 29 7 06/27/2014    RDW 12 8 10/05/2020    RDW 13 1 06/27/2014     10/05/2020     06/27/2014     NT-proBNP: No results found for: NTBNP   CBC:  Lab Results   Component Value Date    WBC 6 4 10/05/2020    WBC 5 73 06/27/2014    RBC 4 59 10/05/2020    RBC 4 72 06/27/2014    HGB 13 9 10/05/2020    HGB 14 0 06/27/2014    HCT 41 6 10/05/2020    HCT 40 9 06/27/2014    MCV 91 10/05/2020    MCV 87 06/27/2014    MCH 30 3 10/05/2020    MCH 29 7 06/27/2014    RDW 12 8 10/05/2020    RDW 13 1 06/27/2014     10/05/2020     06/27/2014     Coags:    Lipid Profile:   Lab Results   Component Value Date    CHOL 168 11/24/2017     Lab Results   Component Value Date    HDL 33 (L) 07/29/2022     Lab Results   Component Value Date    LDLCALC 74 07/29/2022     Lab Results   Component Value Date    TRIG 125 07/29/2022       Cardiac testing:     EKG reviewed personally:   Results for orders placed or performed in visit on 12/09/22   POCT ECG    Impression    Normal sinus rhythm, normal EKG         Stress test  11/18-stress treadmill normal  2020-stress treadmill normal    Calcium score CT  2020-calcium score equals 0    Kulwinder Carreon MD    Portions of the record may have been created with voice recognition software   Occasional wrong word or "sound a like" substitutions may have occurred due to the inherent limitations of voice recognition software   Read the chart carefully and recognize, using context, where substitutions have occurred

## 2022-12-13 ENCOUNTER — TELEPHONE (OUTPATIENT)
Dept: GASTROENTEROLOGY | Facility: CLINIC | Age: 57
End: 2022-12-13

## 2022-12-13 NOTE — TELEPHONE ENCOUNTER
Patients GI provider:  Dr Sarah Menchaca    Number to return call: [phone]    Reason for call:  Saige from [address] is requesting the pt's Colonoscopy report from June 2022 be faxed to [phone]    Scheduled procedure/appointment date if applicable: N/A

## 2023-01-10 LAB
ALDOLASE SERPL-CCNC: 4.9 U/L (ref 3.3–10.3)
CK MB SERPL-MCNC: 1.1 NG/ML (ref 0–10.4)

## 2023-01-25 DIAGNOSIS — E03.8 OTHER SPECIFIED HYPOTHYROIDISM: ICD-10-CM

## 2023-01-25 RX ORDER — LEVOTHYROXINE SODIUM 0.12 MG/1
TABLET ORAL
Qty: 30 TABLET | Refills: 5 | Status: SHIPPED | OUTPATIENT
Start: 2023-01-25

## 2023-02-10 ENCOUNTER — HOSPITAL ENCOUNTER (OUTPATIENT)
Dept: NON INVASIVE DIAGNOSTICS | Facility: CLINIC | Age: 58
Discharge: HOME | End: 2023-02-10

## 2023-02-10 DIAGNOSIS — Z82.49 FAMILY HISTORY OF MYOCARDIAL INFARCTION: ICD-10-CM

## 2023-02-10 LAB
CHEST PAIN STATEMENT: NORMAL
MAX DIASTOLIC BP: 84 MMHG
MAX HEART RATE: 155 BPM
MAX HR PERCENT: 93 %
MAX HR: 155 BPM
MAX PREDICTED HEART RATE: 165 BPM
MAX. SYSTOLIC BP: 178 MMHG
PROTOCOL NAME: NORMAL
RATE PRESSURE PRODUCT: NORMAL
REASON FOR TERMINATION: NORMAL
SL CV STRESS RECOVERY BP: NORMAL MMHG
SL CV STRESS RECOVERY HR: 96 BPM
SL CV STRESS STAGE REACHED: 4
STRESS ANGINA INDEX: 0
STRESS BASELINE BP: NORMAL MMHG
STRESS BASELINE HR: 62 BPM
STRESS DUKE TREADMILL SCORE: 13
STRESS O2 SAT REST: 98 %
STRESS PEAK HR: 155 BPM
STRESS POST ESTIMATED WORKLOAD: 15.3 METS
STRESS POST EXERCISE DUR MIN: 13 MIN
STRESS POST O2 SAT PEAK: 97 %
STRESS POST PEAK BP: 178 MMHG
STRESS ST DEPRESSION: 0 MM
TARGET HR FORMULA: NORMAL
TEST INDICATION: NORMAL
TIME IN EXERCISE PHASE: NORMAL

## 2023-02-24 DIAGNOSIS — E78.1 ESSENTIAL HYPERTRIGLYCERIDEMIA: ICD-10-CM

## 2023-02-24 DIAGNOSIS — J30.1 SEASONAL ALLERGIC RHINITIS DUE TO POLLEN: ICD-10-CM

## 2023-02-24 RX ORDER — ROSUVASTATIN CALCIUM 20 MG/1
TABLET, COATED ORAL
Qty: 90 TABLET | Refills: 1 | Status: SHIPPED | OUTPATIENT
Start: 2023-02-24

## 2023-02-24 RX ORDER — LEVOCETIRIZINE DIHYDROCHLORIDE 5 MG/1
TABLET, FILM COATED ORAL
Qty: 90 TABLET | Refills: 1 | Status: SHIPPED | OUTPATIENT
Start: 2023-02-24

## 2023-02-24 RX ORDER — LISINOPRIL 5 MG/1
TABLET ORAL
Qty: 90 TABLET | Refills: 1 | Status: SHIPPED | OUTPATIENT
Start: 2023-02-24

## 2023-05-25 DIAGNOSIS — J30.1 SEASONAL ALLERGIC RHINITIS DUE TO POLLEN: ICD-10-CM

## 2023-05-25 RX ORDER — FLUTICASONE PROPIONATE 50 MCG
SPRAY, SUSPENSION (ML) NASAL
Qty: 16 G | Refills: 2 | Status: SHIPPED | OUTPATIENT
Start: 2023-05-25

## 2023-07-14 ENCOUNTER — OFFICE VISIT (OUTPATIENT)
Dept: FAMILY MEDICINE CLINIC | Facility: CLINIC | Age: 58
End: 2023-07-14
Payer: COMMERCIAL

## 2023-07-14 VITALS
DIASTOLIC BLOOD PRESSURE: 72 MMHG | WEIGHT: 199 LBS | HEIGHT: 69 IN | BODY MASS INDEX: 29.47 KG/M2 | HEART RATE: 70 BPM | SYSTOLIC BLOOD PRESSURE: 114 MMHG | TEMPERATURE: 97.6 F | OXYGEN SATURATION: 97 %

## 2023-07-14 DIAGNOSIS — R29.898 RIGHT ARM WEAKNESS: Primary | ICD-10-CM

## 2023-07-14 DIAGNOSIS — M79.605 BILATERAL LEG PAIN: ICD-10-CM

## 2023-07-14 DIAGNOSIS — M79.604 BILATERAL LEG PAIN: ICD-10-CM

## 2023-07-14 PROCEDURE — 99214 OFFICE O/P EST MOD 30 MIN: CPT | Performed by: FAMILY MEDICINE

## 2023-07-14 RX ORDER — GABAPENTIN 100 MG/1
100 CAPSULE ORAL
Qty: 30 CAPSULE | Refills: 0 | Status: SHIPPED | OUTPATIENT
Start: 2023-07-14

## 2023-07-14 NOTE — ASSESSMENT & PLAN NOTE
Weakness of the right arm likely secondary to chronic inflammation of the medial and lateral epicondyles  Please follow-up with your sports medicine team for possible ultrasound evaluation  You may benefit from Platelet-rich plasma treatment  The meantime, apply Voltaren gel 2 g twice a day to the affected area and take Voltaren oral tablets 50 mg twice a day with food for the next 2 weeks

## 2023-07-14 NOTE — PROGRESS NOTES
Name: Naida Nguyen. : 1965      MRN: [id number]  Encounter Provider: Anabel Dubin, MD  Encounter Date: 2023   Encounter department: [facility]     1. Right arm weakness  Assessment & Plan:  Weakness of the right arm likely secondary to chronic inflammation of the medial and lateral epicondyles  Please follow-up with your sports medicine team for possible ultrasound evaluation  You may benefit from Platelet-rich plasma treatment  The meantime, apply Voltaren gel 2 g twice a day to the affected area and take Voltaren oral tablets 50 mg twice a day with food for the next 2 weeks    Orders:  -     diclofenac sodium (VOLTAREN) 50 mg EC tablet; Take 1 tablet (50 mg total) by mouth 2 (two) times a day for 14 days  -     Diclofenac Sodium (VOLTAREN) 1 %; Apply 2 g topically 4 (four) times a day    2. Bilateral leg pain  -     gabapentin (Neurontin) 100 mg capsule; Take 1 capsule (100 mg total) by mouth daily at bedtime  Will start patient on short course of gabapentin for trial to help with lower extremity pain at night. BMI Counseling: Body mass index is 29.39 kg/m². The BMI is above normal. Nutrition recommendations include reducing portion sizes, 3-5 servings of fruits/vegetables daily and consuming healthier snacks. Exercise recommendations include moderate aerobic physical activity for 150 minutes/week. Subjective     HPI     79-year-old male patient presents for follow-up regarding his chronic medical conditions. Patient was last seen 2022 for evaluation. Patient reports overall he is doing well, recently had lab work done for his [employer] and he reports no significant abnormal findings. Patient continues to have persistent right-sided pain in his elbow at the medial and lateral epicondyles. He is right-handed and has a history of injury and surgery of the right elbow.   Patient reports in addition to the pain, there is some progressive weakness which is causing him some concern. Patient does have some numbness on the extensor surface of the right forearm to about middle of the forearm. Denies any increased clumsiness. Pain can be up to 4 out of 10 with strenuous use however patient is more concerned about the weakness. Patient reports with certain activities involving the right arm he feels like he is unable to exert the strength necessary to perform these tasks such as putting his arm behind the passenger seat headrest or tightening a ratchet. Of note, patient recently did injure his right toe, there is bruising beneath the great toe nail. Injury occurred about 1 month ago. Denies any significant pain, some mild pain with pressure applied to the area. Is concerned that the toenail will eventually fall off. Pt has chronic issue with pain in the lower extremity that seems to be migratory in nature. This has been ongoing for a while. Massaging and tylenol seems to help. 2-3 times a year its both leg at the same time. Patient denies any overall skin changes, no activity associated with pain. Has not tried any gabapentin for treatment    Review of Systems   Constitutional: Negative for chills and fever. HENT: Negative for congestion, rhinorrhea and sore throat. Covid-19 2 month ago    Respiratory: Negative for chest tightness and shortness of breath. Cardiovascular: Negative for chest pain. Gastrointestinal: Negative for abdominal pain. Musculoskeletal: Positive for arthralgias (Pain over the right elbow greatest at the medial and lateral epicondyles) and neck pain. Negative for back pain. Neurological: Positive for weakness (weakness in the right forearm). Negative for dizziness, light-headedness and headaches. Psychiatric/Behavioral: Negative for sleep disturbance.        Past Medical History:   Diagnosis Date   • Chronic sinusitis     LAST ASSESSED: [id number]   • Closed fracture of lower jaw bone [facility])    • Colon polyp    • COVID     January 2022   • Disease of thyroid gland    • Hyperlipidemia    • Hypertension    • Seasonal allergies    • Seasonal allergies    • Sleep apnea     Doesn't require cpap post UPP surgery     Past Surgical History:   Procedure Laterality Date   • COLONOSCOPY     • HERNIA REPAIR     • MANDIBLE FRACTURE SURGERY      as a child   • SC COLONOSCOPY FLX DX W/COLLJ SPEC WHEN PFRMD N/A 2/20/2017    Procedure: COLONOSCOPY;  Surgeon: Christiana Alvarenga MD;  [facility];   Service: Gastroenterology   • SC RINSJ RPTD BICEPS/TRICEPS TDN DSTL W/WO TDN GRF  8/31/2017    Procedure: RIGHT DISTAL BICEPS TENDON REPAIR;  Surgeon: Theodore Carpio MD;  [facility];  Service: Orthopedics   • SC RPR UMBILICAL HRNA 5 YRS/> REDUCIBLE N/A 11/4/2021    Procedure: UMBILICAL HERNIA REPAIR;  Surgeon: Lacy Cordoba MD;  [facility];  Service: General   • SINUS SURGERY     • TONSILLECTOMY     • TONSILLECTOMY AND ADENOIDECTOMY     • UVULOPALATOPHARYNGOPLASTY     • WISDOM TOOTH EXTRACTION       Family History   Problem Relation Age of Onset   • Diabetes Mother    • Hypertension Mother    • Cancer Mother    • Heart attack Father 48        ACUTE MI   • Cardiomyopathy Father    • Heart disease Father    • Hypertension Brother    • Drug abuse Brother    • Cancer Maternal Grandfather    • Colon cancer Maternal Grandfather    • Stroke Paternal Grandfather         SYNDROME   • Anuerysm Neg Hx    • Clotting disorder Neg Hx    • Heart failure Neg Hx    • Coronary artery disease Neg Hx    • Sudden death Neg Hx         scd     Social History     Socioeconomic History   • Marital status: /Civil Union     Spouse name: None   • Number of children: None   • Years of education: None   • Highest education level: None   Occupational History   • None   Tobacco Use   • Smoking status: Never   • Smokeless tobacco: Never   Vaping Use   • Vaping Use: Never used   Substance and Sexual Activity   • Alcohol use: Yes     Comment: social, events or weekends   • Drug use: No   • Sexual activity: Yes   Other Topics Concern   • None   Social History Narrative   • None     Social Determinants of Health     Financial Resource Strain: Not on file   Food Insecurity: Not on file   Transportation Needs: Not on file   Physical Activity: Not on file   Stress: Not on file   Social Connections: Not on file   Intimate Partner Violence: Not on file   Housing Stability: Not on file     Current Outpatient Medications on File Prior to Visit   Medication Sig   • Cholecalciferol (VITAMIN D3) 1000 units CAPS Take by mouth   • co-enzyme Q-10 100 mg capsule Take by mouth   • fluticasone (FLONASE) 50 mcg/act nasal spray Use 2 sprays in each nostril every day   • levocetirizine (XYZAL) 5 MG tablet Take 1 tablet by mouth every evening   • levothyroxine 125 mcg tablet Take 1 tablet by mouth 30 minutes before breakfast   • lisinopril (ZESTRIL) 5 mg tablet Take 1 tablet by mouth every day   • Magnesium 100 MG CAPS Take by mouth daily   • Multiple Vitamin (DAILY VALUE MULTIVITAMIN) TABS Take by mouth   • omega-3-acid ethyl esters (LOVAZA) 1 g capsule Take 2 capsules (2 g total) by mouth 2 (two) times a day   • rosuvastatin (CRESTOR) 20 MG tablet Take 1 tablet by mouth every day   • Mounjaro 10 MG/0.5ML SOPN inject 10 MENSTRUAL CYCLE subcutaneously every week for 4 weeks     Allergies   Allergen Reactions   • Dust Mite Extract Sneezing     --sinus infection    • Other Other (See Comments)     Annotation - [id number]: Hayfever-sinus infection      Immunization History   Administered Date(s) Administered   • INFLUENZA 10/23/2015, 11/25/2016   • Influenza Quadrivalent Preservative Free 3 years and older IM 10/23/2015, 11/25/2016   • Influenza, injectable, quadrivalent, preservative free 0.5 mL 08/14/2020   • Tdap 10/23/2015, 05/16/2017, 08/12/2022   • Zoster Vaccine Recombinant 08/14/2020, 10/14/2020       Objective     /72 (BP Location: Right arm, Patient Position: Sitting, Cuff Size: Large)   Pulse 70   Temp 97.6 °F (36.4 °C) (Temporal)   Ht 5' 9" (1.753 m)   Wt 90.3 kg (199 lb)   SpO2 97%   BMI 29.39 kg/m²     Physical Exam  Vitals reviewed. Constitutional:       General: He is not in acute distress. Appearance: Normal appearance. He is not ill-appearing, toxic-appearing or diaphoretic. Cardiovascular:      Rate and Rhythm: Normal rate and regular rhythm. Pulses: Normal pulses. Heart sounds: Normal heart sounds. No murmur heard. Pulmonary:      Effort: Pulmonary effort is normal. No respiratory distress. Breath sounds: Normal breath sounds. Abdominal:      General: Abdomen is flat. Bowel sounds are normal. There is no distension. Palpations: Abdomen is soft. Musculoskeletal:         General: Tenderness present. No swelling, deformity or signs of injury. Comments: Tenderness over the lateral and medial epicondyle      Skin:     General: Skin is warm and dry. Capillary Refill: Capillary refill takes less than 2 seconds. Coloration: Skin is not jaundiced. Neurological:      General: No focal deficit present. Mental Status: He is alert.    Psychiatric:         Mood and Affect: Mood normal.            Sharyle Im, MD

## 2023-07-24 DIAGNOSIS — E03.8 OTHER SPECIFIED HYPOTHYROIDISM: ICD-10-CM

## 2023-07-24 RX ORDER — LEVOTHYROXINE SODIUM 0.12 MG/1
TABLET ORAL
Qty: 30 TABLET | Refills: 11 | Status: SHIPPED | OUTPATIENT
Start: 2023-07-24

## 2023-08-23 DIAGNOSIS — E78.1 ESSENTIAL HYPERTRIGLYCERIDEMIA: ICD-10-CM

## 2023-08-23 DIAGNOSIS — J30.1 SEASONAL ALLERGIC RHINITIS DUE TO POLLEN: ICD-10-CM

## 2023-08-23 RX ORDER — ROSUVASTATIN CALCIUM 20 MG/1
TABLET, COATED ORAL
Qty: 30 TABLET | Refills: 0 | Status: SHIPPED | OUTPATIENT
Start: 2023-08-23

## 2023-08-23 RX ORDER — LEVOCETIRIZINE DIHYDROCHLORIDE 5 MG/1
TABLET, FILM COATED ORAL
Qty: 30 TABLET | Refills: 3 | Status: SHIPPED | OUTPATIENT
Start: 2023-08-23

## 2023-08-23 RX ORDER — LISINOPRIL 5 MG/1
TABLET ORAL
Qty: 30 TABLET | Refills: 0 | Status: SHIPPED | OUTPATIENT
Start: 2023-08-23

## 2023-08-24 ENCOUNTER — APPOINTMENT (OUTPATIENT)
Dept: LAB | Facility: MEDICAL CENTER | Age: 58
End: 2023-08-24
Payer: COMMERCIAL

## 2023-08-24 DIAGNOSIS — E78.5 HYPERLIPIDEMIA, UNSPECIFIED HYPERLIPIDEMIA TYPE: ICD-10-CM

## 2023-08-24 DIAGNOSIS — I10 BENIGN ESSENTIAL HYPERTENSION: ICD-10-CM

## 2023-08-24 DIAGNOSIS — E78.1 ESSENTIAL HYPERTRIGLYCERIDEMIA: ICD-10-CM

## 2023-08-24 DIAGNOSIS — E03.8 OTHER SPECIFIED HYPOTHYROIDISM: ICD-10-CM

## 2023-08-24 LAB
ANION GAP SERPL CALCULATED.3IONS-SCNC: 9 MMOL/L
BUN SERPL-MCNC: 21 MG/DL (ref 5–25)
CALCIUM SERPL-MCNC: 10.4 MG/DL (ref 8.4–10.2)
CHLORIDE SERPL-SCNC: 103 MMOL/L (ref 96–108)
CHOLEST SERPL-MCNC: 160 MG/DL
CK SERPL-CCNC: 88 U/L (ref 39–308)
CO2 SERPL-SCNC: 31 MMOL/L (ref 21–32)
CREAT SERPL-MCNC: 1.07 MG/DL (ref 0.6–1.3)
GFR SERPL CREATININE-BSD FRML MDRD: 77 ML/MIN/1.73SQ M
GLUCOSE P FAST SERPL-MCNC: 86 MG/DL (ref 65–99)
HDLC SERPL-MCNC: 45 MG/DL
LDLC SERPL CALC-MCNC: 93 MG/DL (ref 0–100)
NONHDLC SERPL-MCNC: 115 MG/DL
POTASSIUM SERPL-SCNC: 4.3 MMOL/L (ref 3.5–5.3)
SODIUM SERPL-SCNC: 143 MMOL/L (ref 135–147)
TRIGL SERPL-MCNC: 111 MG/DL
TSH SERPL DL<=0.05 MIU/L-ACNC: 0.19 UIU/ML (ref 0.45–4.5)

## 2023-08-24 PROCEDURE — 80048 BASIC METABOLIC PNL TOTAL CA: CPT

## 2023-08-24 PROCEDURE — 82085 ASSAY OF ALDOLASE: CPT

## 2023-08-24 PROCEDURE — 36415 COLL VENOUS BLD VENIPUNCTURE: CPT

## 2023-08-24 PROCEDURE — 84439 ASSAY OF FREE THYROXINE: CPT

## 2023-08-24 PROCEDURE — 84443 ASSAY THYROID STIM HORMONE: CPT

## 2023-08-24 PROCEDURE — 80061 LIPID PANEL: CPT

## 2023-08-25 LAB
ALDOLASE SERPL-CCNC: 4.5 U/L (ref 3.3–10.3)
T4 FREE SERPL-MCNC: 0.96 NG/DL (ref 0.61–1.12)

## 2023-10-29 DIAGNOSIS — J30.1 SEASONAL ALLERGIC RHINITIS DUE TO POLLEN: ICD-10-CM

## 2023-10-29 DIAGNOSIS — E78.1 ESSENTIAL HYPERTRIGLYCERIDEMIA: ICD-10-CM

## 2023-10-30 RX ORDER — ROSUVASTATIN CALCIUM 20 MG/1
TABLET, COATED ORAL
Qty: 30 TABLET | Refills: 5 | Status: SHIPPED | OUTPATIENT
Start: 2023-10-30

## 2023-10-30 RX ORDER — LISINOPRIL 5 MG/1
TABLET ORAL
Qty: 30 TABLET | Refills: 5 | Status: SHIPPED | OUTPATIENT
Start: 2023-10-30

## 2023-12-21 DIAGNOSIS — J30.1 SEASONAL ALLERGIC RHINITIS DUE TO POLLEN: ICD-10-CM

## 2023-12-21 RX ORDER — LEVOCETIRIZINE DIHYDROCHLORIDE 5 MG/1
TABLET, FILM COATED ORAL
Qty: 30 TABLET | Refills: 5 | Status: SHIPPED | OUTPATIENT
Start: 2023-12-21

## 2024-01-09 ENCOUNTER — TELEPHONE (OUTPATIENT)
Age: 59
End: 2024-01-09

## 2024-01-19 ENCOUNTER — OFFICE VISIT (OUTPATIENT)
Dept: FAMILY MEDICINE CLINIC | Facility: CLINIC | Age: 59
End: 2024-01-19
Payer: COMMERCIAL

## 2024-01-19 ENCOUNTER — APPOINTMENT (OUTPATIENT)
Dept: LAB | Facility: MEDICAL CENTER | Age: 59
End: 2024-01-19
Payer: COMMERCIAL

## 2024-01-19 VITALS
BODY MASS INDEX: 29.59 KG/M2 | TEMPERATURE: 97.9 F | WEIGHT: 199.8 LBS | DIASTOLIC BLOOD PRESSURE: 80 MMHG | SYSTOLIC BLOOD PRESSURE: 120 MMHG | OXYGEN SATURATION: 99 % | HEART RATE: 64 BPM | HEIGHT: 69 IN

## 2024-01-19 DIAGNOSIS — E03.8 OTHER SPECIFIED HYPOTHYROIDISM: ICD-10-CM

## 2024-01-19 DIAGNOSIS — M79.604 BILATERAL LEG PAIN: ICD-10-CM

## 2024-01-19 DIAGNOSIS — M25.562 CHRONIC PAIN OF LEFT KNEE: ICD-10-CM

## 2024-01-19 DIAGNOSIS — M79.10 MYALGIA: ICD-10-CM

## 2024-01-19 DIAGNOSIS — J30.1 SEASONAL ALLERGIC RHINITIS DUE TO POLLEN: ICD-10-CM

## 2024-01-19 DIAGNOSIS — E78.1 ESSENTIAL HYPERTRIGLYCERIDEMIA: ICD-10-CM

## 2024-01-19 DIAGNOSIS — Z00.00 ANNUAL PHYSICAL EXAM: Primary | ICD-10-CM

## 2024-01-19 DIAGNOSIS — M79.605 BILATERAL LEG PAIN: ICD-10-CM

## 2024-01-19 DIAGNOSIS — G89.29 CHRONIC PAIN OF LEFT KNEE: ICD-10-CM

## 2024-01-19 DIAGNOSIS — I10 BENIGN ESSENTIAL HYPERTENSION: ICD-10-CM

## 2024-01-19 LAB
CRP SERPL QL: 1.3 MG/L
ERYTHROCYTE [SEDIMENTATION RATE] IN BLOOD: 2 MM/HOUR (ref 0–19)
TSH SERPL DL<=0.05 MIU/L-ACNC: 0.65 UIU/ML (ref 0.45–4.5)

## 2024-01-19 PROCEDURE — 84443 ASSAY THYROID STIM HORMONE: CPT

## 2024-01-19 PROCEDURE — 99396 PREV VISIT EST AGE 40-64: CPT | Performed by: FAMILY MEDICINE

## 2024-01-19 PROCEDURE — 36415 COLL VENOUS BLD VENIPUNCTURE: CPT

## 2024-01-19 PROCEDURE — 86140 C-REACTIVE PROTEIN: CPT

## 2024-01-19 PROCEDURE — 85652 RBC SED RATE AUTOMATED: CPT

## 2024-01-19 PROCEDURE — 99214 OFFICE O/P EST MOD 30 MIN: CPT | Performed by: FAMILY MEDICINE

## 2024-01-19 RX ORDER — LISINOPRIL 5 MG/1
5 TABLET ORAL DAILY
Qty: 90 TABLET | Refills: 1 | Status: SHIPPED | OUTPATIENT
Start: 2024-01-19

## 2024-01-19 RX ORDER — GABAPENTIN 100 MG/1
100 CAPSULE ORAL
Qty: 90 CAPSULE | Refills: 0 | Status: SHIPPED | OUTPATIENT
Start: 2024-01-19

## 2024-01-19 NOTE — PROGRESS NOTES
ADULT ANNUAL PHYSICAL  [facility]    NAME: Omar Henry Jr.  AGE: 58 y.o. SEX: male  : 1965     DATE: 2024     Assessment and Plan:     Problem List Items Addressed This Visit          Endocrine    Hypothyroidism    Relevant Orders    TSH, 3rd generation with Free T4 reflex       Cardiovascular and Mediastinum    Benign essential hypertension    Relevant Medications    lisinopril (ZESTRIL) 5 mg tablet       Other    Essential hypertriglyceridemia    Relevant Medications    lisinopril (ZESTRIL) 5 mg tablet    Chronic pain of left knee    Relevant Orders    C-reactive protein    Sedimentation rate, automated    Annual physical exam - Primary    Bilateral leg pain    Relevant Medications    gabapentin (Neurontin) 100 mg capsule     Other Visit Diagnoses       Myalgia        Relevant Orders    C-reactive protein    Sedimentation rate, automated    Seasonal allergic rhinitis due to pollen        Relevant Medications    lisinopril (ZESTRIL) 5 mg tablet          Annual physical completed at this time, patient reports subjective improvement in his MSK symptoms however continues to have occasional flareups, exercise has improved pain and weakness involving the right hand  Patient continues to have episodes of point tenderness in his thighs  Unclear etiology, CK-MB normal, may consider myositis however this is typically a generalized pain rather than point tenderness, obtain CRP and ESR for evaluation.  Patient also has some worsening fatigue recently, concern about thyroid hormone level, has been compliant with levothyroxine 125 mcg daily, repeat TSH with reflex to T4 at this time    Immunizations and preventive care screenings were discussed with patient today. Appropriate education was printed on patient's after visit summary.    Discussed risks and benefits of prostate cancer screening. We discussed the controversial history of PSA screening for prostate  cancer in the [place] as well as the risk of over detection and over treatment of prostate cancer by way of PSA screening.  The patient understands that PSA blood testing is an imperfect way to screen for prostate cancer and that elevated PSA levels in the blood may also be caused by infection, inflammation, prostatic trauma or manipulation, urological procedures, or by benign prostatic enlargement.    The role of the digital rectal examination in prostate cancer screening was also discussed and I discussed with him that there is large interobserver variability in the findings of digital rectal examination.    Counseling:  Alcohol/drug use: discussed moderation in alcohol intake, the recommendations for healthy alcohol use, and avoidance of illicit drug use.  Dental Health: discussed importance of regular tooth brushing, flossing, and dental visits.  Injury prevention: discussed safety/seat belts, safety helmets, smoke detectors, carbon dioxide detectors, and smoking near bedding or upholstery.  Sexual health: discussed sexually transmitted diseases, partner selection, use of condoms, avoidance of unintended pregnancy, and contraceptive alternatives.  Exercise: the importance of regular exercise/physical activity was discussed. Recommend exercise 3-5 times per week for at least 30 minutes.          No follow-ups on file.     Chief Complaint:     Chief Complaint   Patient presents with    Follow-up      History of Present Illness:     Adult Annual Physical   Patient here for a comprehensive physical exam. The patient reports problems - patient continues to have some episodes of msk pain but there is improvement with medications and massages .    Diet and Physical Activity  Diet/Nutrition: well balanced diet and consuming 3-5 servings of fruits/vegetables daily.   Exercise: no formal exercise.      Depression Screening  PHQ-2/9 Depression Screening    Little interest or pleasure in doing things: 0 - not at  all  Feeling down, depressed, or hopeless: 0 - not at all  PHQ-2 Score: 0  PHQ-2 Interpretation: Negative depression screen       General Health  Sleep: snores loudly and witnessed apnea.   Pt does have sleep apnea  Hearing: normal - bilateral. Mild tinnitus   Vision: wears glasses.   Dental: regular dental visits and brushes teeth twice daily.        Health  Symptoms include: none    Advanced Care Planning  Do you have an advanced directive? no  Do you have a durable medical power of ? no     Review of Systems:     Review of Systems   Constitutional:  Negative for chills and fever.   HENT:  Negative for congestion, rhinorrhea and sore throat.    Respiratory:  Negative for chest tightness and shortness of breath.    Cardiovascular:  Negative for chest pain.   Gastrointestinal:  Negative for abdominal pain, constipation, diarrhea, nausea and vomiting.   Musculoskeletal:  Positive for arthralgias. Negative for back pain and neck pain.        Continues to have some pain in the lateral epicondyles on the right hand  Fluctuating spots of tenderness in the lower extremity that improved with massages   Neurological:  Negative for dizziness, light-headedness and headaches.   Psychiatric/Behavioral:  Positive for sleep disturbance (snoring and apnea).       Past Medical History:     Past Medical History:   Diagnosis Date    Chronic sinusitis     LAST ASSESSED: [id number]    Closed fracture of lower jaw bone (HCC)     Colon polyp     COVID     January 2022    Disease of thyroid gland     Hyperlipidemia     Hypertension     Seasonal allergies     Seasonal allergies     Sleep apnea     Doesn't require cpap post UPP surgery      Past Surgical History:     Past Surgical History:   Procedure Laterality Date    COLONOSCOPY      HERNIA REPAIR      MANDIBLE FRACTURE SURGERY      as a child    CT COLONOSCOPY FLX DX W/COLLJ SPEC WHEN PFRMD N/A 2/20/2017    Procedure: COLONOSCOPY;  Surgeon: Sylvester Simpson MD;  [facility];  Service: Gastroenterology    NM RINSJ RPTD BICEPS/TRICEPS TDN DSTL W/WO TDN GRF  8/31/2017    Procedure: RIGHT DISTAL BICEPS TENDON REPAIR;  Surgeon: Jhoan Britt MD;  [facility];  Service: Orthopedics    NM RPR UMBILICAL HRNA 5 YRS/> REDUCIBLE N/A 11/4/2021    Procedure: UMBILICAL HERNIA REPAIR;  Surgeon: Zain Peña MD;  [facility];  Service: General    SINUS SURGERY      TONSILLECTOMY      TONSILLECTOMY AND ADENOIDECTOMY      UVULOPALATOPHARYNGOPLASTY      WISDOM TOOTH EXTRACTION        Family History:     Family History   Problem Relation Age of Onset    Diabetes Mother     Hypertension Mother     Cancer Mother     Heart attack Father 50        ACUTE MI    Cardiomyopathy Father     Heart disease Father     Hypertension Brother     Drug abuse Brother     Cancer Maternal Grandfather     Colon cancer Maternal Grandfather     Stroke Paternal Grandfather         SYNDROME    Anuerysm Neg Hx     Clotting disorder Neg Hx     Heart failure Neg Hx     Coronary artery disease Neg Hx     Sudden death Neg Hx         scd      Social History:     Social History     Socioeconomic History    Marital status: /Civil Union     Spouse name: Not on file    Number of children: Not on file    Years of education: Not on file    Highest education level: Not on file   Occupational History    Not on file   Tobacco Use    Smoking status: Never    Smokeless tobacco: Never   Vaping Use    Vaping status: Never Used   Substance and Sexual Activity    Alcohol use: Yes     Comment: social, events or weekends    Drug use: No    Sexual activity: Yes   Other Topics Concern    Not on file   Social History Narrative    Not on file     Social Determinants of Health     Financial Resource Strain: Not on file   Food Insecurity: Not on file   Transportation Needs: Not on file   Physical Activity: Not on file   Stress: Not on file   Social Connections: Not on file   Intimate Partner Violence: Not on file  "  Housing Stability: Not on file      Current Medications:     Current Outpatient Medications   Medication Sig Dispense Refill    Cholecalciferol (VITAMIN D3) 1000 units CAPS Take by mouth      co-enzyme Q-10 100 mg capsule Take by mouth      Diclofenac Sodium (VOLTAREN) 1 % Apply 2 g topically 4 (four) times a day 150 g 1    fluticasone (FLONASE) 50 mcg/act nasal spray Use 2 sprays in each nostril every day 16 g 2    gabapentin (Neurontin) 100 mg capsule Take 1 capsule (100 mg total) by mouth daily at bedtime 90 capsule 0    levocetirizine (XYZAL) 5 MG tablet Take 1 tablet by mouth every evening 30 tablet 5    levothyroxine 125 mcg tablet Take 1 tablet by mouth 30 minutes before breakfast 30 tablet 11    lisinopril (ZESTRIL) 5 mg tablet Take 1 tablet (5 mg total) by mouth daily 90 tablet 1    Magnesium 100 MG CAPS Take by mouth daily      Multiple Vitamin (DAILY VALUE MULTIVITAMIN) TABS Take by mouth      omega-3-acid ethyl esters (LOVAZA) 1 g capsule Take 2 capsules (2 g total) by mouth 2 (two) times a day 360 capsule 1    rosuvastatin (CRESTOR) 20 MG tablet Take 1 tablet by mouth every day 30 tablet 5    diclofenac sodium (VOLTAREN) 50 mg EC tablet Take 1 tablet (50 mg total) by mouth 2 (two) times a day for 14 days 28 tablet 0    Mounjaro 10 MG/0.5ML SOPN inject 10 MENSTRUAL CYCLE subcutaneously every week for 4 weeks       No current facility-administered medications for this visit.      Allergies:     Allergies   Allergen Reactions    Dust Mite Extract Sneezing     --sinus infection     Other Other (See Comments)     Annotation - [id number]: Hayfever-sinus infection       Physical Exam:     /80 (BP Location: Right arm, Patient Position: Sitting, Cuff Size: Large)   Pulse 64   Temp 97.9 °F (36.6 °C) (Temporal)   Ht 5' 9\" (1.753 m)   Wt 90.6 kg (199 lb 12.8 oz)   SpO2 99%   BMI 29.51 kg/m²     Physical Exam  Vitals reviewed.   Constitutional:       General: He is not in acute distress.     Appearance: " Normal appearance. He is not ill-appearing, toxic-appearing or diaphoretic.   Cardiovascular:      Rate and Rhythm: Normal rate and regular rhythm.      Pulses: Normal pulses.      Heart sounds: Normal heart sounds. No murmur heard.  Pulmonary:      Effort: Pulmonary effort is normal. No respiratory distress.      Breath sounds: Normal breath sounds.   Abdominal:      General: Abdomen is flat. Bowel sounds are normal. There is no distension.      Palpations: Abdomen is soft.   Musculoskeletal:         General: No swelling, tenderness, deformity or signs of injury.   Skin:     General: Skin is warm and dry.      Capillary Refill: Capillary refill takes less than 2 seconds.      Coloration: Skin is not jaundiced.   Neurological:      General: No focal deficit present.      Mental Status: He is alert and oriented to person, place, and time.   Psychiatric:         Mood and Affect: Mood normal.            Ed Damon MD  [facility]

## 2024-01-23 ENCOUNTER — TELEPHONE (OUTPATIENT)
Age: 59
End: 2024-01-23

## 2024-01-23 NOTE — TELEPHONE ENCOUNTER
Irish from [facility] called to see if the pts lisinopril was d/c. I explained it was not, it was recently filled to a local pharmacy. No further action needed.

## 2024-03-20 DIAGNOSIS — E78.1 ESSENTIAL HYPERTRIGLYCERIDEMIA: ICD-10-CM

## 2024-03-20 RX ORDER — ROSUVASTATIN CALCIUM 20 MG/1
TABLET, COATED ORAL
Qty: 30 TABLET | Refills: 5 | Status: SHIPPED | OUTPATIENT
Start: 2024-03-20

## 2024-05-25 DIAGNOSIS — E03.8 OTHER SPECIFIED HYPOTHYROIDISM: ICD-10-CM

## 2024-05-25 RX ORDER — LEVOTHYROXINE SODIUM 0.12 MG/1
TABLET ORAL
Qty: 30 TABLET | Refills: 11 | Status: SHIPPED | OUTPATIENT
Start: 2024-05-25

## 2024-06-18 DIAGNOSIS — J30.1 SEASONAL ALLERGIC RHINITIS DUE TO POLLEN: ICD-10-CM

## 2024-06-18 RX ORDER — LEVOCETIRIZINE DIHYDROCHLORIDE 5 MG/1
TABLET, FILM COATED ORAL
Qty: 30 TABLET | Refills: 5 | Status: SHIPPED | OUTPATIENT
Start: 2024-06-18

## 2024-08-02 ENCOUNTER — OFFICE VISIT (OUTPATIENT)
Dept: FAMILY MEDICINE CLINIC | Facility: CLINIC | Age: 59
End: 2024-08-02
Payer: COMMERCIAL

## 2024-08-02 VITALS
BODY MASS INDEX: 29.92 KG/M2 | OXYGEN SATURATION: 99 % | WEIGHT: 202 LBS | HEART RATE: 65 BPM | HEIGHT: 69 IN | SYSTOLIC BLOOD PRESSURE: 122 MMHG | DIASTOLIC BLOOD PRESSURE: 84 MMHG | TEMPERATURE: 97.4 F

## 2024-08-02 DIAGNOSIS — F43.9 STRESS: Primary | ICD-10-CM

## 2024-08-02 DIAGNOSIS — E78.1 ESSENTIAL HYPERTRIGLYCERIDEMIA: ICD-10-CM

## 2024-08-02 DIAGNOSIS — E03.8 OTHER SPECIFIED HYPOTHYROIDISM: ICD-10-CM

## 2024-08-02 DIAGNOSIS — F41.9 ANXIETY: ICD-10-CM

## 2024-08-02 DIAGNOSIS — J30.1 SEASONAL ALLERGIC RHINITIS DUE TO POLLEN: ICD-10-CM

## 2024-08-02 DIAGNOSIS — E78.5 HYPERLIPIDEMIA, UNSPECIFIED HYPERLIPIDEMIA TYPE: ICD-10-CM

## 2024-08-02 DIAGNOSIS — I10 BENIGN ESSENTIAL HYPERTENSION: ICD-10-CM

## 2024-08-02 DIAGNOSIS — M79.605 BILATERAL LEG PAIN: ICD-10-CM

## 2024-08-02 DIAGNOSIS — M79.604 BILATERAL LEG PAIN: ICD-10-CM

## 2024-08-02 PROBLEM — Z86.19 HISTORY OF HERPES GENITALIS: Status: ACTIVE | Noted: 2024-08-02

## 2024-08-02 PROCEDURE — 3079F DIAST BP 80-89 MM HG: CPT | Performed by: FAMILY MEDICINE

## 2024-08-02 PROCEDURE — 3074F SYST BP LT 130 MM HG: CPT | Performed by: FAMILY MEDICINE

## 2024-08-02 PROCEDURE — 99214 OFFICE O/P EST MOD 30 MIN: CPT | Performed by: FAMILY MEDICINE

## 2024-08-02 RX ORDER — GABAPENTIN 100 MG/1
100 CAPSULE ORAL
Qty: 90 CAPSULE | Refills: 0 | Status: SHIPPED | OUTPATIENT
Start: 2024-08-02

## 2024-08-02 RX ORDER — ROSUVASTATIN CALCIUM 20 MG/1
20 TABLET, COATED ORAL DAILY
Qty: 100 TABLET | Refills: 2 | Status: SHIPPED | OUTPATIENT
Start: 2024-08-02

## 2024-08-02 RX ORDER — LISINOPRIL 5 MG/1
5 TABLET ORAL DAILY
Qty: 100 TABLET | Refills: 2 | Status: SHIPPED | OUTPATIENT
Start: 2024-08-02

## 2024-08-02 NOTE — PROGRESS NOTES
Ambulatory Visit  Name: Omar Henry Jr.      : 1965      MRN: [id number]  Encounter Provider: Ed Damon MD  Encounter Date: 2024   Encounter department: [facility]    Assessment & Plan   1. Stress  2. Anxiety  -     TSH, 3rd generation with Free T4 reflex; Future  3. Benign essential hypertension  -     Comprehensive metabolic panel; Future  -     TSH, 3rd generation with Free T4 reflex; Future  4. Bilateral leg pain  -     gabapentin (Neurontin) 100 mg capsule; Take 1 capsule (100 mg total) by mouth daily at bedtime  5. Seasonal allergic rhinitis due to pollen  -     lisinopril (ZESTRIL) 5 mg tablet; Take 1 tablet (5 mg total) by mouth daily  6. Essential hypertriglyceridemia  Comments:  reorder med  Orders:  -     lisinopril (ZESTRIL) 5 mg tablet; Take 1 tablet (5 mg total) by mouth daily  -     rosuvastatin (CRESTOR) 20 MG tablet; Take 1 tablet (20 mg total) by mouth daily  7. Other specified hypothyroidism  8. Hyperlipidemia, unspecified hyperlipidemia type  -     Lipid panel; Future    Discussed management of anxiety symptoms  Likely from adjustment disorder   Inform patient if his emotional symptom is causing dysfunction and difficulty with taking care of himself or his family please return for evaluation, medication may help at that time, will refill other medication including gabapentin lisinopril and Crestor.  Will have patient complete blood work including lipid panel CMP and TSH in approximately 6 weeks       History of Present Illness     HPI    Valentin is a 59-year-old male patient presents for follow-up.  Patient was seen in 2024 for annual physical.  Recently patient has been undergoing large amount of stress due to family related issues.  Reports he does have worsening anxiety symptoms however is not interested in medication at this time.  Patient denies any SI or HI.  He has been consistent with his medication, uses gabapentin for bilateral leg pain which  seems to be helping.  Pressure today is 122/84.      Review of Systems   Constitutional:  Negative for chills and fever.   HENT:  Negative for congestion, rhinorrhea and sore throat.    Respiratory:  Negative for shortness of breath.    Cardiovascular:  Negative for chest pain.   Gastrointestinal:  Negative for abdominal pain, constipation, diarrhea, nausea and vomiting.   Neurological:  Negative for dizziness, light-headedness and headaches.   Psychiatric/Behavioral:  Positive for sleep disturbance (due to stress). Negative for self-injury and suicidal ideas. The patient is nervous/anxious.        Past Medical History:   Diagnosis Date    Chronic sinusitis     LAST ASSESSED: [id number]    Closed fracture of lower jaw bone (HCC)     Colon polyp     COVID     January 2022    Disease of thyroid gland     Hyperlipidemia     Hypertension     Seasonal allergies     Seasonal allergies     Sleep apnea     Doesn't require cpap post UPP surgery     Past Surgical History:   Procedure Laterality Date    COLONOSCOPY      HERNIA REPAIR      MANDIBLE FRACTURE SURGERY      as a child    MS COLONOSCOPY FLX DX W/COLLJ SPEC WHEN PFRMD N/A 2/20/2017    Procedure: COLONOSCOPY;  Surgeon: Sylvester Simpson MD;  [facility];  Service: Gastroenterology    MS RINSJ RPTD BICEPS/TRICEPS TDN DSTL W/WO TDN GRF  8/31/2017    Procedure: RIGHT DISTAL BICEPS TENDON REPAIR;  Surgeon: Jhoan Britt MD;  [facility];  Service: Orthopedics    MS RPR UMBILICAL HRNA 5 YRS/> REDUCIBLE N/A 11/4/2021    Procedure: UMBILICAL HERNIA REPAIR;  Surgeon: Zain Peña MD;  [facility];  Service: General    SINUS SURGERY      TONSILLECTOMY      TONSILLECTOMY AND ADENOIDECTOMY      UVULOPALATOPHARYNGOPLASTY      WISDOM TOOTH EXTRACTION       Family History   Problem Relation Age of Onset    Diabetes Mother     Hypertension Mother     Cancer Mother     Heart attack Father 50        ACUTE MI    Cardiomyopathy Father     Heart disease  Father     Hypertension Brother     Drug abuse Brother     Cancer Maternal Grandfather     Colon cancer Maternal Grandfather     Stroke Paternal Grandfather         SYNDROME    Anuerysm Neg Hx     Clotting disorder Neg Hx     Heart failure Neg Hx     Coronary artery disease Neg Hx     Sudden death Neg Hx         scd     Social History     Tobacco Use    Smoking status: Never    Smokeless tobacco: Never   Vaping Use    Vaping status: Never Used   Substance and Sexual Activity    Alcohol use: Yes     Comment: social, events or weekends    Drug use: No    Sexual activity: Yes     Current Outpatient Medications on File Prior to Visit   Medication Sig    Cholecalciferol (VITAMIN D3) 1000 units CAPS Take by mouth    co-enzyme Q-10 100 mg capsule Take by mouth    fluticasone (FLONASE) 50 mcg/act nasal spray Use 2 sprays in each nostril every day    levocetirizine (XYZAL) 5 MG tablet Take 1 tablet by mouth every evening    levothyroxine 125 mcg tablet Take 1 tablet by mouth 30 minutes before breakfast    Magnesium 100 MG CAPS Take by mouth daily    Multiple Vitamin (DAILY VALUE MULTIVITAMIN) TABS Take by mouth    omega-3-acid ethyl esters (LOVAZA) 1 g capsule Take 2 capsules (2 g total) by mouth 2 (two) times a day    [DISCONTINUED] gabapentin (Neurontin) 100 mg capsule Take 1 capsule (100 mg total) by mouth daily at bedtime    [DISCONTINUED] lisinopril (ZESTRIL) 5 mg tablet Take 1 tablet (5 mg total) by mouth daily    [DISCONTINUED] rosuvastatin (CRESTOR) 20 MG tablet Take 1 tablet by mouth every day    Diclofenac Sodium (VOLTAREN) 1 % Apply 2 g topically 4 (four) times a day (Patient not taking: Reported on 8/2/2024)    diclofenac sodium (VOLTAREN) 50 mg EC tablet Take 1 tablet (50 mg total) by mouth 2 (two) times a day for 14 days (Patient not taking: Reported on 8/2/2024)    [DISCONTINUED] Mounjaro 10 MG/0.5ML SOPN inject 10 MENSTRUAL CYCLE subcutaneously every week for 4 weeks     Allergies   Allergen Reactions    Dust  "Mite Extract Sneezing     --sinus infection     Other Other (See Comments)     Annotation - [id number]: Hayfever-sinus infection      Immunization History   Administered Date(s) Administered    INFLUENZA 10/23/2015, 11/25/2016    Influenza Quadrivalent Preservative Free 3 years and older IM 10/23/2015, 11/25/2016    Influenza, injectable, quadrivalent, preservative free 0.5 mL 08/14/2020    Tdap 10/23/2015, 05/16/2017, 08/12/2022    Zoster Vaccine Recombinant 08/14/2020, 10/14/2020     Objective     /84 (BP Location: Right arm, Patient Position: Sitting, Cuff Size: Large)   Pulse 65   Temp (!) 97.4 °F (36.3 °C) (Temporal)   Ht 5' 9\" (1.753 m)   Wt 91.6 kg (202 lb)   SpO2 99%   BMI 29.83 kg/m²     Physical Exam  Vitals reviewed.   Constitutional:       General: He is not in acute distress.     Appearance: Normal appearance. He is not ill-appearing, toxic-appearing or diaphoretic.   Cardiovascular:      Rate and Rhythm: Normal rate.      Pulses: Normal pulses.   Pulmonary:      Effort: Pulmonary effort is normal.   Abdominal:      General: Abdomen is flat.   Musculoskeletal:         General: No swelling or deformity.   Skin:     General: Skin is warm and dry.      Capillary Refill: Capillary refill takes less than 2 seconds.      Coloration: Skin is not jaundiced.   Neurological:      General: No focal deficit present.      Mental Status: He is alert.   Psychiatric:         Mood and Affect: Mood normal.             "

## 2024-08-26 ENCOUNTER — NURSE TRIAGE (OUTPATIENT)
Age: 59
End: 2024-08-26

## 2024-08-26 DIAGNOSIS — E03.8 OTHER SPECIFIED HYPOTHYROIDISM: ICD-10-CM

## 2024-08-26 NOTE — TELEPHONE ENCOUNTER
"Pt called in stating that he has had mild amount of blood when he has a bowel movement. Pt states that the blood is red and located more on the surface of the stool. Pt states that he also has pain, feels like a pinching sensation when he goes. Pt states that he has had a hemorrhoid removed last year. States that it feels similar to that, just not as severe. Denies abdominal pain, fevers, n/v or bloating. Pt flexible to schedule for OV. Please advise.     Reason for Disposition   MILD rectal bleeding (more than just a few drops or streaks)    Answer Assessment - Initial Assessment Questions  1. APPEARANCE of BLOOD: \"What color is it?\" \"Is it passed separately, on the surface of the stool, or mixed in with the stool?\"       Surface  2. AMOUNT: \"How much blood was passed?\"       Small  3. FREQUENCY: \"How many times has blood been passed with the stools?\"       Daily  4. ONSET: \"When was the blood first seen in the stools?\" (Days or weeks)       6 months ago  5. DIARRHEA: \"Is there also some diarrhea?\" If Yes, ask: \"How many diarrhea stools were passed in past 24 hours?\"       No  6. CONSTIPATION: \"Do you have constipation?\" If Yes, ask: \"How bad is it?\"      No  7. RECURRENT SYMPTOMS: \"Have you had blood in your stools before?\" If Yes, ask: \"When was the last time?\" and \"What happened that time?\"       Yes-hemorrhoids  8. BLOOD THINNERS: \"Do you take any blood thinners?\" (e.g., Coumadin/warfarin, Pradaxa/dabigatran, aspirin)      No  9. OTHER SYMPTOMS: \"Do you have any other symptoms?\"  (e.g., abdominal pain, vomiting, dizziness, fever)      Denies    Protocols used: Rectal Bleeding-ADULT-[place]    "

## 2024-08-26 NOTE — TELEPHONE ENCOUNTER
Regarding: rectal bleeding, chronic  ----- Message from Yamileth PATEL sent at 8/26/2024  4:32 PM EDT -----  Sent via My chart    Hi CRISTIANO Addison have a persistent issue that I did not mention. Prob because I assumed it is what it is. I have been experiencing blood in my stool just about every time I go to the bathroom. I figured it was hemorrhoids though it does seem to have increased over the last 6 months or so. With everything else going on in my life I was not too concerned. My wife pressured me to let you know. Not sure what we can do. [email or web address]     Thanks,     Valentin

## 2024-08-27 RX ORDER — LEVOTHYROXINE SODIUM 125 UG/1
125 TABLET ORAL
Qty: 30 TABLET | Refills: 5 | Status: SHIPPED | OUTPATIENT
Start: 2024-08-27

## 2024-08-29 ENCOUNTER — OFFICE VISIT (OUTPATIENT)
Dept: FAMILY MEDICINE CLINIC | Facility: CLINIC | Age: 59
End: 2024-08-29
Payer: COMMERCIAL

## 2024-08-29 VITALS
HEART RATE: 78 BPM | BODY MASS INDEX: 29.92 KG/M2 | DIASTOLIC BLOOD PRESSURE: 82 MMHG | TEMPERATURE: 97.4 F | SYSTOLIC BLOOD PRESSURE: 122 MMHG | OXYGEN SATURATION: 97 % | HEIGHT: 69 IN | WEIGHT: 202 LBS

## 2024-08-29 DIAGNOSIS — S61.231A: ICD-10-CM

## 2024-08-29 DIAGNOSIS — K64.4 EXTERNAL HEMORRHOIDS: Primary | ICD-10-CM

## 2024-08-29 DIAGNOSIS — J30.1 SEASONAL ALLERGIC RHINITIS DUE TO POLLEN: ICD-10-CM

## 2024-08-29 PROCEDURE — 99214 OFFICE O/P EST MOD 30 MIN: CPT | Performed by: FAMILY MEDICINE

## 2024-08-29 RX ORDER — LEVOCETIRIZINE DIHYDROCHLORIDE 5 MG/1
5 TABLET, FILM COATED ORAL EVERY EVENING
Qty: 90 TABLET | Refills: 0 | Status: SHIPPED | OUTPATIENT
Start: 2024-08-29

## 2024-08-29 RX ORDER — HYDROCORTISONE 25 MG/G
CREAM TOPICAL 2 TIMES DAILY
Qty: 28 G | Refills: 2 | Status: SHIPPED | OUTPATIENT
Start: 2024-08-29

## 2024-08-29 NOTE — PROGRESS NOTES
Ambulatory Visit  Name: Omar Henry Jr.      : 1965      MRN: [id number]  Encounter Provider: Ed Damon MD  Encounter Date: 2024   Encounter department: [facility]    Assessment & Plan   1. External hemorrhoids  -     hydrocortisone (ANUSOL-HC) 2.5 % rectal cream; Apply topically 2 (two) times a day  2. Seasonal allergic rhinitis due to pollen  -     levocetirizine (XYZAL) 5 MG tablet; Take 1 tablet (5 mg total) by mouth every evening  3. Puncture wound of left index finger without foreign body without damage to nail, initial encounter  -     XR hand 2 vw left; Future; Expected date: 2024    Puncture wound to the ventral aspect of the left index finger with a electric power drill, penetrated to the depth of approximately 1 cm, concern for bony involvement.  Patient reports persistent numbness will obtain x-ray for evaluation, will have patient try topical antibiotic ointment for now, if there is evidence of osteomyelitis more intensive antibiotic may be necessary    For rectal bleeding, will have patient try hydrocortisone 2.5% cream twice a day for the next 10 days  Maintain adequate hydration, adequate fiber intake    Refilled Xyzal         History of Present Illness       HPI    Valentin is a 59-year-old male patient presents for 2 separate issues.  Issue #1 is regarding a recent injury to his left index finger.  On  patient was working with a power tool when a metal drill bit went into his left index finger.  Patient approximately an effusion less than 1 cm.  Bleeding stopped on its own with applied pressure.  Some over-the-counter medication was applied to the area.  Patient continues have some numbness and tingling over the affected area and distal fingertip.  Last tetanus shot was in .    Patient also reports a few months history of mild rectal bleeding. Pt nguyễn shave a history of external hemorrhoids, this was removed but recently symptoms has been getting  worse.   Some pinching sensation with bowel movement.   Only BRBPR, no enough to taint the toilet bowel.       Review of Systems   Constitutional:  Negative for chills and fever.   HENT:  Negative for congestion.    Respiratory:  Negative for chest tightness and shortness of breath.    Cardiovascular:  Negative for chest pain.   Gastrointestinal:  Positive for blood in stool (brbrp). Negative for abdominal pain, anal bleeding, diarrhea, nausea and vomiting.   Neurological:  Negative for dizziness, light-headedness and headaches.       Past Medical History:   Diagnosis Date    Chronic sinusitis     LAST ASSESSED: [id number]    Closed fracture of lower jaw bone (HCC)     Colon polyp     COVID     January 2022    Disease of thyroid gland     Hyperlipidemia     Hypertension     Seasonal allergies     Seasonal allergies     Sleep apnea     Doesn't require cpap post UPP surgery     Past Surgical History:   Procedure Laterality Date    COLONOSCOPY      HERNIA REPAIR      MANDIBLE FRACTURE SURGERY      as a child    HI COLONOSCOPY FLX DX W/COLLJ SPEC WHEN PFRMD N/A 2/20/2017    Procedure: COLONOSCOPY;  Surgeon: Sylvester Simpson MD;  [facility];  Service: Gastroenterology    HI RINSJ RPTD BICEPS/TRICEPS TDN DSTL W/WO TDN GRF  8/31/2017    Procedure: RIGHT DISTAL BICEPS TENDON REPAIR;  Surgeon: Jhoan Britt MD;  [facility];  Service: Orthopedics    HI RPR UMBILICAL HRNA 5 YRS/> REDUCIBLE N/A 11/4/2021    Procedure: UMBILICAL HERNIA REPAIR;  Surgeon: Zain Peña MD;  [facility];  Service: General    SINUS SURGERY      TONSILLECTOMY      TONSILLECTOMY AND ADENOIDECTOMY      UVULOPALATOPHARYNGOPLASTY      WISDOM TOOTH EXTRACTION       Family History   Problem Relation Age of Onset    Diabetes Mother     Hypertension Mother     Cancer Mother     Heart attack Father 50        ACUTE MI    Cardiomyopathy Father     Heart disease Father     Hypertension Brother     Drug abuse Brother     Cancer  Maternal Grandfather     Colon cancer Maternal Grandfather     Stroke Paternal Grandfather         SYNDROME    Anuerysm Neg Hx     Clotting disorder Neg Hx     Heart failure Neg Hx     Coronary artery disease Neg Hx     Sudden death Neg Hx         scd     Social History     Tobacco Use    Smoking status: Never    Smokeless tobacco: Never   Vaping Use    Vaping status: Never Used   Substance and Sexual Activity    Alcohol use: Yes     Comment: social, events or weekends    Drug use: No    Sexual activity: Yes     Current Outpatient Medications on File Prior to Visit   Medication Sig    Cholecalciferol (VITAMIN D3) 1000 units CAPS Take by mouth    co-enzyme Q-10 100 mg capsule Take by mouth    fluticasone (FLONASE) 50 mcg/act nasal spray Use 2 sprays in each nostril every day    gabapentin (Neurontin) 100 mg capsule Take 1 capsule (100 mg total) by mouth daily at bedtime    levothyroxine 125 mcg tablet Take 1 tablet (125 mcg total) by mouth daily before breakfast 30 minutes prior    lisinopril (ZESTRIL) 5 mg tablet Take 1 tablet (5 mg total) by mouth daily    Magnesium 100 MG CAPS Take by mouth daily    Multiple Vitamin (DAILY VALUE MULTIVITAMIN) TABS Take by mouth    omega-3-acid ethyl esters (LOVAZA) 1 g capsule Take 2 capsules (2 g total) by mouth 2 (two) times a day    rosuvastatin (CRESTOR) 20 MG tablet Take 1 tablet (20 mg total) by mouth daily    [DISCONTINUED] levocetirizine (XYZAL) 5 MG tablet Take 1 tablet by mouth every evening    Diclofenac Sodium (VOLTAREN) 1 % Apply 2 g topically 4 (four) times a day (Patient not taking: Reported on 8/2/2024)    diclofenac sodium (VOLTAREN) 50 mg EC tablet Take 1 tablet (50 mg total) by mouth 2 (two) times a day for 14 days     Allergies   Allergen Reactions    Dust Mite Extract Sneezing     --sinus infection     Other Other (See Comments)     Annotation - [id number]: Hayfever-sinus infection      Immunization History   Administered Date(s) Administered    INFLUENZA  "10/23/2015, 11/25/2016    Influenza Quadrivalent Preservative Free 3 years and older IM 10/23/2015, 11/25/2016    Influenza, injectable, quadrivalent, preservative free 0.5 mL 08/14/2020    Tdap 10/23/2015, 05/16/2017, 08/12/2022    Zoster Vaccine Recombinant 08/14/2020, 10/14/2020     Objective     /82 (BP Location: Left arm, Patient Position: Sitting, Cuff Size: Standard)   Pulse 78   Temp (!) 97.4 °F (36.3 °C)   Ht 5' 9\" (1.753 m)   Wt 91.6 kg (202 lb)   SpO2 97%   BMI 29.83 kg/m²     Physical Exam  Vitals reviewed.   Constitutional:       General: He is not in acute distress.     Appearance: Normal appearance. He is not ill-appearing or toxic-appearing.   Cardiovascular:      Rate and Rhythm: Normal rate and regular rhythm.      Pulses: Normal pulses.      Heart sounds: Normal heart sounds.   Pulmonary:      Effort: Pulmonary effort is normal. No respiratory distress.      Breath sounds: Normal breath sounds.   Abdominal:      General: Abdomen is flat. There is no distension.      Palpations: Abdomen is soft.   Musculoskeletal:         General: No swelling.   Skin:     General: Skin is warm and dry.      Capillary Refill: Capillary refill takes less than 2 seconds.      Coloration: Skin is not jaundiced.   Neurological:      General: No focal deficit present.      Mental Status: He is alert and oriented to person, place, and time.   Psychiatric:         Mood and Affect: Mood normal.           Ed Damon M.D.  Family Medicine    Please excuse any \"sound-alike\" errors that may have ocurred during the process of dictation. Parts of this note have been dictated and there may be errors present in the transcription process. Thank you.    "

## 2024-10-20 DIAGNOSIS — E03.8 OTHER SPECIFIED HYPOTHYROIDISM: ICD-10-CM

## 2024-10-20 RX ORDER — LEVOTHYROXINE SODIUM 125 UG/1
125 TABLET ORAL
Qty: 90 TABLET | Refills: 1 | Status: SHIPPED | OUTPATIENT
Start: 2024-10-20

## 2024-11-12 DIAGNOSIS — E03.8 OTHER SPECIFIED HYPOTHYROIDISM: ICD-10-CM

## 2024-11-13 RX ORDER — LEVOTHYROXINE SODIUM 125 UG/1
125 TABLET ORAL
Qty: 90 TABLET | Refills: 1 | Status: SHIPPED | OUTPATIENT
Start: 2024-11-13

## 2024-11-23 ENCOUNTER — APPOINTMENT (OUTPATIENT)
Dept: LAB | Facility: MEDICAL CENTER | Age: 59
End: 2024-11-23
Payer: COMMERCIAL

## 2024-11-23 DIAGNOSIS — F41.9 ANXIETY: ICD-10-CM

## 2024-11-23 DIAGNOSIS — E78.5 HYPERLIPIDEMIA, UNSPECIFIED HYPERLIPIDEMIA TYPE: ICD-10-CM

## 2024-11-23 DIAGNOSIS — I10 BENIGN ESSENTIAL HYPERTENSION: ICD-10-CM

## 2024-11-23 LAB
ALBUMIN SERPL BCG-MCNC: 4.5 G/DL (ref 3.5–5)
ALP SERPL-CCNC: 57 U/L (ref 34–104)
ALT SERPL W P-5'-P-CCNC: 22 U/L (ref 7–52)
ANION GAP SERPL CALCULATED.3IONS-SCNC: 7 MMOL/L (ref 4–13)
AST SERPL W P-5'-P-CCNC: 22 U/L (ref 13–39)
BILIRUB SERPL-MCNC: 0.44 MG/DL (ref 0.2–1)
BUN SERPL-MCNC: 10 MG/DL (ref 5–25)
CALCIUM SERPL-MCNC: 9.5 MG/DL (ref 8.4–10.2)
CHLORIDE SERPL-SCNC: 104 MMOL/L (ref 96–108)
CHOLEST SERPL-MCNC: 142 MG/DL (ref ?–200)
CO2 SERPL-SCNC: 31 MMOL/L (ref 21–32)
CREAT SERPL-MCNC: 1.01 MG/DL (ref 0.6–1.3)
GFR SERPL CREATININE-BSD FRML MDRD: 82 ML/MIN/1.73SQ M
GLUCOSE P FAST SERPL-MCNC: 96 MG/DL (ref 65–99)
HDLC SERPL-MCNC: 35 MG/DL
LDLC SERPL CALC-MCNC: 64 MG/DL (ref 0–100)
NONHDLC SERPL-MCNC: 107 MG/DL
POTASSIUM SERPL-SCNC: 4.9 MMOL/L (ref 3.5–5.3)
PROT SERPL-MCNC: 6.8 G/DL (ref 6.4–8.4)
SODIUM SERPL-SCNC: 142 MMOL/L (ref 135–147)
TRIGL SERPL-MCNC: 217 MG/DL (ref ?–150)
TSH SERPL DL<=0.05 MIU/L-ACNC: 2.27 UIU/ML (ref 0.45–4.5)

## 2024-11-23 PROCEDURE — 84443 ASSAY THYROID STIM HORMONE: CPT

## 2024-11-23 PROCEDURE — 36415 COLL VENOUS BLD VENIPUNCTURE: CPT

## 2024-11-23 PROCEDURE — 80053 COMPREHEN METABOLIC PANEL: CPT

## 2024-11-23 PROCEDURE — 80061 LIPID PANEL: CPT

## 2024-11-25 NOTE — PROGRESS NOTES
Cardiology  Follow Up   Office Visit Note  Omar Henry Jr.   59 y.o.   male   MRN: [id number]  [facility] [place]  [address] [address]  [address]  [place] [address] [phone]  PCP: Ed Damon MD  Cardiologist: Dr. Sellers              Summary of Plan:  He was provided written material regarding lifestyle management of hypertriglyceridemia  Echocardiogram to assess for structural heart disease, given CV risk factors, family history  Hemoglobin A1c.  TSH.    Adjunctive therapies such as massage, heat, hot tub may be helpful for his intermittent focal lower extremity myalgias  Due for a CAC score in 2025- he is amenable to this. Will defer for now  Fasting lipid profile 3 months.  He was out of Lovaza for a period of time, and this was recently reordered  He may benefit from a rheumatological evaluation given his intermittent, focal lower extremity myalgias.  In the differential would be fibromyalgia.  He has no claudication symptoms  Follow-up with me 6 months, Dr. Sellers 1 year  Colon Ca screenin2022 , up-to-date            Assessment/Plan  HTN.    /90  On lisinopril 5 mg daily  Hyperlipidemia, mixed  On Lovaza 2 g twice daily, rosuvastatin 20 mg daily, coenzyme q10, 100 mg daily.   However he has been out of the Lovaza for a period of time.  His last labs were drawn when he was off of it.  This was reordered today.  We can reevaluate it in about 3 months.  He was also on Mounjaro in the past and is now off.  2024: Calculated LDL 64 non-.  .  Will check a hemoglobin A1c and TSH to rule out secondary causes.   Latest Reference Range & Units 22 11:43 23 12:13 24 09:05   Cholesterol See Comment mg/dL 130 160 142   Triglycerides See Comment mg/dL 125 111 217 (H)   HDL >=40 mg/dL 33 (L) 45 35 (L)   Non-HDL Cholesterol mg/dl  115 107   LDL Calculated 0 - 100 mg/dL 74 93 64   VLDL Cholesterol Estevan 5 - 40 mg/dL 23     Family  history myocardial infarction  Hypothyroidism on replacement  History obesity.  Current BMI 29.  Reports his weight is around 200 pounds, pretty stable recently  Cardiac testing  EKG stress test 11/2018: Normal  CAC 9/20/2020: 0  EST 2/10/2023.  13 minutes, 93% MPHR 15.3 METS of activity.  No angina.  Normal BP response and heart rate demonstrated normal response to stress.  Normal stress test                  HPI  Omar Henry Jr. is a 59 y.o.year old with hypertension, mixed hyperlipidemia and family history of myocardial infarction.  He has had obesity.  He has been on Mounjaro.  He has lost weight.  His father required transplant for severe ischemic cardiomyopathy.  He follows with Dr. Sellers.  He was last seen in the office 12/9/2022.      12/9/22 OV Dr. Sellers  Per his note:  He is taking a supplement called Mounjaro, appetite suppressant, he is lost 20 pounds, he feels great, he would like to continue losing a bit more.  He is trying to focus on small portions, intermittent fasting, and happy with his progress so far.  He is experiencing orthostatic lightheadedness, he continues on lisinopril, his blood pressure is low normal.  Lipids earlier this year showed improvement in triglycerides down to 125, LDL 74, HDL 33 but he was about 10 to 15 pounds heavier at that time.He is not doing a lot of exercise at this time, he has had some issues with his right elbow which makes exercise difficult.  Plan  Check aldolase, check CPK, ordered lipids for him to do when his weight hits its olivia  Check treadmill stress test for ongoing atypical chest pain and strong family history  6-month follow-up  Discontinue amlodipine    Office weight 12/9/2022: 193.4 pound     11/26/24  Routine cardiology follow-up.  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  Vascular.  He denies chest pain, shortness of breath, lightheadedness or dizziness.  He does however admit to intermittent focal muscle aches of his thighs, which responds to him massaging it.  The symptoms can occur randomly, do not occur daily.  Sometimes it may be a couple weeks before he feels the symptoms.  In the past he did take a statin holiday for a few weeks.  This holiday did not change/improve these myalgias.  He denies claudication.  In fact when he stands and walks, these muscle symptoms improved.  He has been off/out of Lovaza for a period of time.  His last labs were drawn when he was off of it  He is also been off Mounjaro for a period of time.  His last labs were when he was off of it  His weight stable  His blood pressure today is 133/90  Today, I recommend obtaining echocardiogram to assess for structural heart disease, given hypertension  He may benefit from a rheumatological evaluation given his intermittent focal myalgias.  In the differential should be fibromyalgia  He is on coenzyme Q ten 100 mg daily.  Will increase this to twice daily  Refilled his cardiac medications.  Will reassess his lipids, in about 3 months.  I emphasized that should be a fasting specimen given his hypertriglyceridemia.  I also provided written dietary education/lifestyle management of hypertriglyceridemia.  Exercise should help as well  His wife asked if he is due for calcium score.  Current recommendations are to repeat this in 5 years which will be next September 2025  Wt 205 lb  BP 1343/90  He will return to see me in 6 months; Dr. Sellers in a yea      Review of Systems   Constitutional: Negative for chills.   Cardiovascular:  Negative for chest pain, claudication, cyanosis, dyspnea on exertion, irregular heartbeat, leg swelling, near-syncope, orthopnea, palpitations, paroxysmal nocturnal dyspnea and syncope.   Respiratory:  Negative for cough and shortness of breath.    Musculoskeletal:  Positive for  myalgias.        See HPI   Gastrointestinal:  Negative for heartburn and nausea.   Neurological:  Negative for dizziness, focal weakness, headaches, light-headedness and weakness.   All other systems reviewed and are negative.            Assessment  Diagnoses and all orders for this visit:    Benign essential hypertension  -     POCT ECG  -     Echo complete w/ contrast if indicated; Future    Essential hypertriglyceridemia  -     Hemoglobin A1C; Future  -     TSH, 3rd generation with Free T4 reflex; Future  -     rosuvastatin (CRESTOR) 20 MG tablet; Take 1 tablet (20 mg total) by mouth daily  -     lisinopril (ZESTRIL) 5 mg tablet; Take 1 tablet (5 mg total) by mouth daily  -     omega-3-acid ethyl esters (LOVAZA) 1 g capsule; Take 2 capsules (2 g total) by mouth 2 (two) times a day  -     Lipid panel; Future    Mixed hyperlipidemia  -     co-enzyme Q-10 100 mg capsule; Take 1 capsule (100 mg total) by mouth 2 (two) times a day  -     TSH, 3rd generation with Free T4 reflex; Future  -     Lipid panel; Future    Essential hypertriglyceridemia  Comments:  reorder med  Orders:  -     Hemoglobin A1C; Future  -     TSH, 3rd generation with Free T4 reflex; Future  -     rosuvastatin (CRESTOR) 20 MG tablet; Take 1 tablet (20 mg total) by mouth daily  -     lisinopril (ZESTRIL) 5 mg tablet; Take 1 tablet (5 mg total) by mouth daily  -     omega-3-acid ethyl esters (LOVAZA) 1 g capsule; Take 2 capsules (2 g total) by mouth 2 (two) times a day  -     Lipid panel; Future    Family history of myocardial infarction  -     Echo complete w/ contrast if indicated; Future        Past Medical History:   Diagnosis Date    Chronic sinusitis     LAST ASSESSED: [id number]    Closed fracture of lower jaw bone (HCC)     Colon polyp     COVID     January 2022    Disease of thyroid gland     Hyperlipidemia     Hypertension     Seasonal allergies     Seasonal allergies     Sleep apnea     Doesn't require cpap post UPP surgery       Past  Surgical History:   Procedure Laterality Date    COLONOSCOPY      HERNIA REPAIR      MANDIBLE FRACTURE SURGERY      as a child    SD COLONOSCOPY FLX DX W/COLLJ SPEC WHEN PFRMD N/A 2/20/2017    Procedure: COLONOSCOPY;  Surgeon: Sylvester Simpson MD;  [facility];  Service: Gastroenterology    SD RINSJ RPTD BICEPS/TRICEPS TDN DSTL W/WO TDN GRF  8/31/2017    Procedure: RIGHT DISTAL BICEPS TENDON REPAIR;  Surgeon: Jhoan Britt MD;  [facility];  Service: Orthopedics    SD RPR UMBILICAL HRNA 5 YRS/> REDUCIBLE N/A 11/4/2021    Procedure: UMBILICAL HERNIA REPAIR;  Surgeon: Zain Peña MD;  [facility];  Service: General    SINUS SURGERY      TONSILLECTOMY      TONSILLECTOMY AND ADENOIDECTOMY      UVULOPALATOPHARYNGOPLASTY      WISDOM TOOTH EXTRACTION             Allergies  Allergies   Allergen Reactions    Dust Mite Extract Sneezing     --sinus infection     Other Other (See Comments)     Annotation - [id number]: Hayfever-sinus infection          Medications    Current Outpatient Medications:     Cholecalciferol (VITAMIN D3) 1000 units CAPS, Take by mouth, Disp: , Rfl:     co-enzyme Q-10 100 mg capsule, Take 1 capsule (100 mg total) by mouth 2 (two) times a day, Disp: , Rfl:     fluticasone (FLONASE) 50 mcg/act nasal spray, Use 2 sprays in each nostril every day, Disp: 16 g, Rfl: 2    gabapentin (Neurontin) 100 mg capsule, Take 1 capsule (100 mg total) by mouth daily at bedtime, Disp: 90 capsule, Rfl: 0    levocetirizine (XYZAL) 5 MG tablet, Take 1 tablet (5 mg total) by mouth every evening, Disp: 90 tablet, Rfl: 0    levothyroxine 125 mcg tablet, TAKE 1 TABLET (125 MCG TOTAL) BY MOUTH DAILY BEFORE BREAKFAST 30 MINUTES PRIOR, Disp: 90 tablet, Rfl: 1    lisinopril (ZESTRIL) 5 mg tablet, Take 1 tablet (5 mg total) by mouth daily, Disp: 90 tablet, Rfl: 3    Magnesium 100 MG CAPS, Take by mouth daily, Disp: , Rfl:     Multiple Vitamin (DAILY VALUE MULTIVITAMIN) TABS, Take by mouth, Disp: , Rfl:      omega-3-acid ethyl esters (LOVAZA) 1 g capsule, Take 2 capsules (2 g total) by mouth 2 (two) times a day, Disp: 360 capsule, Rfl: 1    rosuvastatin (CRESTOR) 20 MG tablet, Take 1 tablet (20 mg total) by mouth daily, Disp: 90 tablet, Rfl: 3    fluticasone (FLONASE) 50 mcg/act nasal spray, 2 sprays into each nostril 2 (two) times a day (Patient not taking: Reported on 11/26/2024), Disp: 32 g, Rfl: 1    hydrocortisone (ANUSOL-HC) 2.5 % rectal cream, Apply topically 2 (two) times a day (Patient not taking: Reported on 11/26/2024), Disp: 28 g, Rfl: 2      Social History     Socioeconomic History    Marital status: /Civil Union     Spouse name: Not on file    Number of children: Not on file    Years of education: Not on file    Highest education level: Not on file   Occupational History    Not on file   Tobacco Use    Smoking status: Never    Smokeless tobacco: Never   Vaping Use    Vaping status: Never Used   Substance and Sexual Activity    Alcohol use: Yes     Comment: social, events or weekends    Drug use: No    Sexual activity: Yes   Other Topics Concern    Not on file   Social History Narrative    Not on file     Social Drivers of Health     Financial Resource Strain: Not on file   Food Insecurity: Not on file   Transportation Needs: Not on file   Physical Activity: Not on file   Stress: Not on file   Social Connections: Not on file   Intimate Partner Violence: Not on file   Housing Stability: Not on file       Family History   Problem Relation Age of Onset    Diabetes Mother     Hypertension Mother     Cancer Mother     Heart attack Father 50        ACUTE MI    Cardiomyopathy Father     Heart disease Father     Hypertension Brother     Drug abuse Brother     Cancer Maternal Grandfather     Colon cancer Maternal Grandfather     Stroke Paternal Grandfather         SYNDROME    Anuerysm Neg Hx     Clotting disorder Neg Hx     Heart failure Neg Hx     Coronary artery disease Neg Hx     Sudden death Neg Hx   "       scd       Physical Exam  Vitals and nursing note reviewed.   Constitutional:       General: He is not in acute distress.  HENT:      Head: Normocephalic and atraumatic.   Eyes:      Extraocular Movements: Extraocular movements intact.      Conjunctiva/sclera: Conjunctivae normal.   Cardiovascular:      Rate and Rhythm: Normal rate and regular rhythm.      Pulses: Intact distal pulses.      Heart sounds: Normal heart sounds.   Pulmonary:      Effort: Pulmonary effort is normal.      Breath sounds: Normal breath sounds.   Abdominal:      General: Bowel sounds are normal.      Palpations: Abdomen is soft.   Musculoskeletal:         General: Normal range of motion.      Cervical back: Normal range of motion and neck supple.   Skin:     General: Skin is warm and dry.   Neurological:      Mental Status: He is alert and oriented to person, place, and time.   Psychiatric:         Mood and Affect: Mood normal.         Vitals: Blood pressure 133/90, pulse 65, weight 93.2 kg (205 lb 6.4 oz), SpO2 98%.   Wt Readings from Last 3 Encounters:   11/26/24 93.2 kg (205 lb 6.4 oz)   11/15/24 91.6 kg (202 lb)   08/29/24 91.6 kg (202 lb)           Labs & Results:  Lab Results   Component Value Date    WBC 6.4 10/05/2020    HGB 13.9 10/05/2020    HCT 41.6 10/05/2020    MCV 91 10/05/2020     10/05/2020     No results found for: \"BNP\"  No components found for: \"CHEM\"  Total CK   Date Value Ref Range Status   08/24/2023 88 39 - 308 U/L Final     No results found for this or any previous visit.    No results found for this or any previous visit.    No valid procedures specified.  No results found for this or any previous visit.                This note was completed in part utilizing "[employer] direct voice recognition software.   Grammatical errors, random word insertion, spelling mistakes, and incomplete sentences may be an occasional consequence of the system secondary to software limitations, ambient noise and hardware " issues. At the time of dictation, efforts were made to edit, clarify and /or correct errors.  Please read the chart carefully and recognize, using context, where substitutions have occurred.  If you have any questions or concerns about the context, text or information contained within the body of this dictation, please contact myself, the provider, for further clarification

## 2024-11-26 ENCOUNTER — OFFICE VISIT (OUTPATIENT)
Dept: CARDIOLOGY CLINIC | Facility: CLINIC | Age: 59
End: 2024-11-26
Payer: COMMERCIAL

## 2024-11-26 VITALS
HEART RATE: 65 BPM | DIASTOLIC BLOOD PRESSURE: 90 MMHG | SYSTOLIC BLOOD PRESSURE: 133 MMHG | BODY MASS INDEX: 30.33 KG/M2 | WEIGHT: 205.4 LBS | OXYGEN SATURATION: 98 %

## 2024-11-26 DIAGNOSIS — E78.1 ESSENTIAL HYPERTRIGLYCERIDEMIA: ICD-10-CM

## 2024-11-26 DIAGNOSIS — I10 BENIGN ESSENTIAL HYPERTENSION: Primary | ICD-10-CM

## 2024-11-26 DIAGNOSIS — Z82.49 FAMILY HISTORY OF MYOCARDIAL INFARCTION: ICD-10-CM

## 2024-11-26 DIAGNOSIS — E78.2 MIXED HYPERLIPIDEMIA: ICD-10-CM

## 2024-11-26 PROCEDURE — 99215 OFFICE O/P EST HI 40 MIN: CPT | Performed by: NURSE PRACTITIONER

## 2024-11-26 PROCEDURE — 93000 ELECTROCARDIOGRAM COMPLETE: CPT | Performed by: NURSE PRACTITIONER

## 2024-11-26 RX ORDER — ROSUVASTATIN CALCIUM 20 MG/1
20 TABLET, COATED ORAL DAILY
Qty: 90 TABLET | Refills: 3 | Status: SHIPPED | OUTPATIENT
Start: 2024-11-26

## 2024-11-26 RX ORDER — LISINOPRIL 5 MG/1
5 TABLET ORAL DAILY
Qty: 90 TABLET | Refills: 3 | Status: SHIPPED | OUTPATIENT
Start: 2024-11-26

## 2024-11-26 RX ORDER — UBIDECARENONE 100 MG
100 CAPSULE ORAL 2 TIMES DAILY
Start: 2024-11-26

## 2024-11-26 RX ORDER — OMEGA-3-ACID ETHYL ESTERS 1 G/1
2 CAPSULE, LIQUID FILLED ORAL 2 TIMES DAILY
Qty: 360 CAPSULE | Refills: 1 | Status: SHIPPED | OUTPATIENT
Start: 2024-11-26

## 2024-11-26 NOTE — LETTER
2024     Ed Damon MD  [facility]   [address]  [facility] [address]    Patient: Omar Henry Jr.   YOB: 1965   Date of Visit: 2024       Dear Dr. Damon:    Thank you for referring Omar Henry to me for evaluation. Below are my notes for this consultation.    If you have questions, please do not hesitate to call me. I look forward to following your patient along with you.         Sincerely,        MOIZ Rodriguez        CC: MD Leslie Ma CRNP  2024  9:14 AM  Sign when Signing Visit  Cardiology  Follow Up   Office Visit Note  Omar Henry Jr.   59 y.o.   male   MRN: [id number]  [facility] [place]  [address]  [facility] [address]  [phone]  PCP: Ed Damon MD  Cardiologist: Dr. Sellers              Summary of Plan:  He was provided written material regarding lifestyle management of hypertriglyceridemia  Echocardiogram to assess for structural heart disease, given CV risk factors, family history  Hemoglobin A1c.  TSH.    Adjunctive therapies such as massage, heat, hot tub may be helpful for his intermittent focal lower extremity myalgias  Due for a CAC score in 2025- he is amenable to this. Will defer for now  Fasting lipid profile 3 months.  He was out of Lovaza for a period of time, and this was recently reordered  He may benefit from a rheumatological evaluation given his intermittent, focal lower extremity myalgias.  In the differential would be fibromyalgia.  He has no claudication symptoms  Follow-up with me 6 months, Dr. Sellers 1 year  Colon Ca screenin2022 , up-to-date            Assessment/Plan  HTN.    /90  On lisinopril 5 mg daily  Hyperlipidemia, mixed  On Lovaza 2 g twice daily, rosuvastatin 20 mg daily, coenzyme q10, 100 mg daily.   However he has been out of the Lovaza for a period of time.  His last labs were drawn when he was off of  it.  This was reordered today.  We can reevaluate it in about 3 months.  He was also on Mounjaro in the past and is now off.  11/23/2024: Calculated LDL 64 non-.  .  Will check a hemoglobin A1c and TSH to rule out secondary causes.   Latest Reference Range & Units 07/29/22 11:43 08/24/23 12:13 11/23/24 09:05   Cholesterol See Comment mg/dL 130 160 142   Triglycerides See Comment mg/dL 125 111 217 (H)   HDL >=40 mg/dL 33 (L) 45 35 (L)   Non-HDL Cholesterol mg/dl  115 107   LDL Calculated 0 - 100 mg/dL 74 93 64   VLDL Cholesterol [place] 5 - 40 mg/dL 23     Family history myocardial infarction  Hypothyroidism on replacement  History obesity.  Current BMI 29.  Reports his weight is around 200 pounds, pretty stable recently  Cardiac testing  EKG stress test 11/2018: Normal  CAC 9/20/2020: 0  EST 2/10/2023.  13 minutes, 93% MPHR 15.3 METS of activity.  No angina.  Normal BP response and heart rate demonstrated normal response to stress.  Normal stress test                  HPI  Omar Henry Jr. is a 59 y.o.year old with hypertension, mixed hyperlipidemia and family history of myocardial infarction.  He has had obesity.  He has been on Mounjaro.  He has lost weight.  His father required transplant for severe ischemic cardiomyopathy.  He follows with Dr. Sellers.  He was last seen in the office 12/9/2022.      12/9/22 OV Dr. Sellers  Per his note:  He is taking a supplement called Mounjaro, appetite suppressant, he is lost 20 pounds, he feels great, he would like to continue losing a bit more.  He is trying to focus on small portions, intermittent fasting, and happy with his progress so far.  He is experiencing orthostatic lightheadedness, he continues on lisinopril, his blood pressure is low normal.  Lipids earlier this year showed improvement in triglycerides down to 125, LDL 74, HDL 33 but he was about 10 to 15 pounds heavier at that time.He is not doing a lot of exercise at this time, he has had some issues  with his right elbow which makes exercise difficult.  Plan  Check aldolase, check CPK, ordered lipids for him to do when his weight hits its olivia  Check treadmill stress test for ongoing atypical chest pain and strong family history  6-month follow-up  Discontinue amlodipine    Office weight 12/9/2022: 193.4 pound     11/26/24  Routine cardiology follow-up.  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  Vascular.  He denies chest pain, shortness of breath, lightheadedness or dizziness.  He does however admit to intermittent focal muscle aches of his thighs, which responds to him massaging it.  The symptoms can occur randomly, do not occur daily.  Sometimes it may be a couple weeks before he feels the symptoms.  In the past he did take a statin holiday for a few weeks.  This holiday did not change/improve these myalgias.  He denies claudication.  In fact when he stands and walks, these muscle symptoms improved.  He has been off/out of Lovaza for a period of time.  His last labs were drawn when he was off of it  He is also been off Mounjaro for a period of time.  His last labs were when he was off of it  His weight stable  His blood pressure today is 133/90  Today, I recommend obtaining echocardiogram to assess for structural heart disease, given hypertension  He may benefit from a rheumatological evaluation given his intermittent focal myalgias.  In the differential should be fibromyalgia  He is on coenzyme Q ten 100 mg daily.  Will increase this to twice daily  Refilled his cardiac medications.  Will reassess his lipids, in about 3 months.  I emphasized that should be a fasting specimen given his hypertriglyceridemia.  I also provided written dietary education/lifestyle management of hypertriglyceridemia.  Exercise should help as well  His  wife asked if he is due for calcium score.  Current recommendations are to repeat this in 5 years which will be next September 2025  Wt 205 lb  BP 1343/90  He will return to see me in 6 months; Dr. Sellers in a yea      Review of Systems   Constitutional: Negative for chills.   Cardiovascular:  Negative for chest pain, claudication, cyanosis, dyspnea on exertion, irregular heartbeat, leg swelling, near-syncope, orthopnea, palpitations, paroxysmal nocturnal dyspnea and syncope.   Respiratory:  Negative for cough and shortness of breath.    Musculoskeletal:  Positive for myalgias.        See HPI   Gastrointestinal:  Negative for heartburn and nausea.   Neurological:  Negative for dizziness, focal weakness, headaches, light-headedness and weakness.   All other systems reviewed and are negative.            Assessment  Diagnoses and all orders for this visit:    Benign essential hypertension  -     POCT ECG  -     Echo complete w/ contrast if indicated; Future    Essential hypertriglyceridemia  -     Hemoglobin A1C; Future  -     TSH, 3rd generation with Free T4 reflex; Future  -     rosuvastatin (CRESTOR) 20 MG tablet; Take 1 tablet (20 mg total) by mouth daily  -     lisinopril (ZESTRIL) 5 mg tablet; Take 1 tablet (5 mg total) by mouth daily  -     omega-3-acid ethyl esters (LOVAZA) 1 g capsule; Take 2 capsules (2 g total) by mouth 2 (two) times a day  -     Lipid panel; Future    Mixed hyperlipidemia  -     co-enzyme Q-10 100 mg capsule; Take 1 capsule (100 mg total) by mouth 2 (two) times a day  -     TSH, 3rd generation with Free T4 reflex; Future  -     Lipid panel; Future    Essential hypertriglyceridemia  Comments:  reorder med  Orders:  -     Hemoglobin A1C; Future  -     TSH, 3rd generation with Free T4 reflex; Future  -     rosuvastatin (CRESTOR) 20 MG tablet; Take 1 tablet (20 mg total) by mouth daily  -     lisinopril (ZESTRIL) 5 mg tablet; Take 1 tablet (5 mg total) by mouth daily  -     omega-3-acid ethyl  esters (LOVAZA) 1 g capsule; Take 2 capsules (2 g total) by mouth 2 (two) times a day  -     Lipid panel; Future    Family history of myocardial infarction  -     Echo complete w/ contrast if indicated; Future        Past Medical History:   Diagnosis Date   • Chronic sinusitis     LAST ASSESSED: [id number]   • Closed fracture of lower jaw bone (HCC)    • Colon polyp    • COVID     January 2022   • Disease of thyroid gland    • Hyperlipidemia    • Hypertension    • Seasonal allergies    • Seasonal allergies    • Sleep apnea     Doesn't require cpap post UPP surgery       Past Surgical History:   Procedure Laterality Date   • COLONOSCOPY     • HERNIA REPAIR     • MANDIBLE FRACTURE SURGERY      as a child   • NH COLONOSCOPY FLX DX W/COLLJ SPEC WHEN PFRMD N/A 2/20/2017    Procedure: COLONOSCOPY;  Surgeon: Sylvester Simpson MD;  Location: [facility];  Service: Gastroenterology   • NH RINSJ RPTD BICEPS/TRICEPS TDN DSTL W/WO TDN GRF  8/31/2017    Procedure: RIGHT DISTAL BICEPS TENDON REPAIR;  Surgeon: Jhoan Britt MD;  Location: [facility];  Service: Orthopedics   • NH RPR UMBILICAL HRNA 5 YRS/> REDUCIBLE N/A 11/4/2021    Procedure: UMBILICAL HERNIA REPAIR;  Surgeon: Zain Peña MD;  Location: [facility];  Service: General   • SINUS SURGERY     • TONSILLECTOMY     • TONSILLECTOMY AND ADENOIDECTOMY     • UVULOPALATOPHARYNGOPLASTY     • WISDOM TOOTH EXTRACTION             Allergies  Allergies   Allergen Reactions   • Dust Mite Extract Sneezing     --sinus infection    • Other Other (See Comments)     Annotation - [id number]: Hayfever-sinus infection          Medications    Current Outpatient Medications:   •  Cholecalciferol (VITAMIN D3) 1000 units CAPS, Take by mouth, Disp: , Rfl:   •  co-enzyme Q-10 100 mg capsule, Take 1 capsule (100 mg total) by mouth 2 (two) times a day, Disp: , Rfl:   •  fluticasone (FLONASE) 50 mcg/act nasal spray, Use 2 sprays in each nostril every day, Disp: 16 g, Rfl: 2  •  gabapentin  (Neurontin) 100 mg capsule, Take 1 capsule (100 mg total) by mouth daily at bedtime, Disp: 90 capsule, Rfl: 0  •  levocetirizine (XYZAL) 5 MG tablet, Take 1 tablet (5 mg total) by mouth every evening, Disp: 90 tablet, Rfl: 0  •  levothyroxine 125 mcg tablet, TAKE 1 TABLET (125 MCG TOTAL) BY MOUTH DAILY BEFORE BREAKFAST 30 MINUTES PRIOR, Disp: 90 tablet, Rfl: 1  •  lisinopril (ZESTRIL) 5 mg tablet, Take 1 tablet (5 mg total) by mouth daily, Disp: 90 tablet, Rfl: 3  •  Magnesium 100 MG CAPS, Take by mouth daily, Disp: , Rfl:   •  Multiple Vitamin (DAILY VALUE MULTIVITAMIN) TABS, Take by mouth, Disp: , Rfl:   •  omega-3-acid ethyl esters (LOVAZA) 1 g capsule, Take 2 capsules (2 g total) by mouth 2 (two) times a day, Disp: 360 capsule, Rfl: 1  •  rosuvastatin (CRESTOR) 20 MG tablet, Take 1 tablet (20 mg total) by mouth daily, Disp: 90 tablet, Rfl: 3  •  fluticasone (FLONASE) 50 mcg/act nasal spray, 2 sprays into each nostril 2 (two) times a day (Patient not taking: Reported on 11/26/2024), Disp: 32 g, Rfl: 1  •  hydrocortisone (ANUSOL-HC) 2.5 % rectal cream, Apply topically 2 (two) times a day (Patient not taking: Reported on 11/26/2024), Disp: 28 g, Rfl: 2      Social History     Socioeconomic History   • Marital status: /Civil Union     Spouse name: Not on file   • Number of children: Not on file   • Years of education: Not on file   • Highest education level: Not on file   Occupational History   • Not on file   Tobacco Use   • Smoking status: Never   • Smokeless tobacco: Never   Vaping Use   • Vaping status: Never Used   Substance and Sexual Activity   • Alcohol use: Yes     Comment: social, events or weekends   • Drug use: No   • Sexual activity: Yes   Other Topics Concern   • Not on file   Social History Narrative   • Not on file     Social Drivers of Health     Financial Resource Strain: Not on file   Food Insecurity: Not on file   Transportation Needs: Not on file   Physical Activity: Not on file   Stress:  Not on file   Social Connections: Not on file   Intimate Partner Violence: Not on file   Housing Stability: Not on file       Family History   Problem Relation Age of Onset   • Diabetes Mother    • Hypertension Mother    • Cancer Mother    • Heart attack Father 50        ACUTE MI   • Cardiomyopathy Father    • Heart disease Father    • Hypertension Brother    • Drug abuse Brother    • Cancer Maternal Grandfather    • Colon cancer Maternal Grandfather    • Stroke Paternal Grandfather         SYNDROME   • Anuerysm Neg Hx    • Clotting disorder Neg Hx    • Heart failure Neg Hx    • Coronary artery disease Neg Hx    • Sudden death Neg Hx         scd       Physical Exam  Vitals and nursing note reviewed.   Constitutional:       General: He is not in acute distress.  HENT:      Head: Normocephalic and atraumatic.   Eyes:      Extraocular Movements: Extraocular movements intact.      Conjunctiva/sclera: Conjunctivae normal.   Cardiovascular:      Rate and Rhythm: Normal rate and regular rhythm.      Pulses: Intact distal pulses.      Heart sounds: Normal heart sounds.   Pulmonary:      Effort: Pulmonary effort is normal.      Breath sounds: Normal breath sounds.   Abdominal:      General: Bowel sounds are normal.      Palpations: Abdomen is soft.   Musculoskeletal:         General: Normal range of motion.      Cervical back: Normal range of motion and neck supple.   Skin:     General: Skin is warm and dry.   Neurological:      Mental Status: He is alert and oriented to person, place, and time.   Psychiatric:         Mood and Affect: Mood normal.         Vitals: Blood pressure 133/90, pulse 65, weight 93.2 kg (205 lb 6.4 oz), SpO2 98%.   Wt Readings from Last 3 Encounters:   11/26/24 93.2 kg (205 lb 6.4 oz)   11/15/24 91.6 kg (202 lb)   08/29/24 91.6 kg (202 lb)           Labs & Results:  Lab Results   Component Value Date    WBC 6.4 10/05/2020    HGB 13.9 10/05/2020    HCT 41.6 10/05/2020    MCV 91 10/05/2020      "10/05/2020     No results found for: \"BNP\"  No components found for: \"CHEM\"  Total CK   Date Value Ref Range Status   08/24/2023 88 39 - 308 U/L Final     No results found for this or any previous visit.    No results found for this or any previous visit.    No valid procedures specified.  No results found for this or any previous visit.                This note was completed in part utilizing [employer] direct voice recognition software.   Grammatical errors, random word insertion, spelling mistakes, and incomplete sentences may be an occasional consequence of the system secondary to software limitations, ambient noise and hardware issues. At the time of dictation, efforts were made to edit, clarify and /or correct errors.  Please read the chart carefully and recognize, using context, where substitutions have occurred.  If you have any questions or concerns about the context, text or information contained within the body of this dictation, please contact myself, the provider, for further clarification      "

## 2024-12-03 ENCOUNTER — RESULTS FOLLOW-UP (OUTPATIENT)
Dept: FAMILY MEDICINE CLINIC | Facility: CLINIC | Age: 59
End: 2024-12-03

## 2024-12-06 DIAGNOSIS — E03.8 OTHER SPECIFIED HYPOTHYROIDISM: ICD-10-CM

## 2024-12-06 RX ORDER — LEVOTHYROXINE SODIUM 125 UG/1
125 TABLET ORAL
Qty: 90 TABLET | Refills: 1 | Status: SHIPPED | OUTPATIENT
Start: 2024-12-06

## 2024-12-12 DIAGNOSIS — M79.605 BILATERAL LEG PAIN: ICD-10-CM

## 2024-12-12 DIAGNOSIS — J30.1 SEASONAL ALLERGIC RHINITIS DUE TO POLLEN: ICD-10-CM

## 2024-12-12 DIAGNOSIS — M79.604 BILATERAL LEG PAIN: ICD-10-CM

## 2024-12-13 RX ORDER — GABAPENTIN 100 MG/1
100 CAPSULE ORAL
Qty: 90 CAPSULE | Refills: 1 | Status: SHIPPED | OUTPATIENT
Start: 2024-12-13

## 2024-12-13 RX ORDER — LEVOCETIRIZINE DIHYDROCHLORIDE 5 MG/1
5 TABLET, FILM COATED ORAL EVERY EVENING
Qty: 90 TABLET | Refills: 1 | Status: SHIPPED | OUTPATIENT
Start: 2024-12-13

## 2024-12-16 ENCOUNTER — HOSPITAL ENCOUNTER (OUTPATIENT)
Dept: CT IMAGING | Facility: HOSPITAL | Age: 59
Discharge: HOME | End: 2024-12-16
Attending: OTOLARYNGOLOGY
Payer: COMMERCIAL

## 2024-12-16 DIAGNOSIS — J32.4 CHRONIC PANSINUSITIS: ICD-10-CM

## 2024-12-16 DIAGNOSIS — K21.9 LARYNGOPHARYNGEAL REFLUX (LPR): ICD-10-CM

## 2024-12-16 DIAGNOSIS — J34.829 NASAL VALVE COLLAPSE: ICD-10-CM

## 2024-12-16 PROCEDURE — 70486 CT MAXILLOFACIAL W/O DYE: CPT

## 2024-12-17 ENCOUNTER — OFFICE VISIT (OUTPATIENT)
Dept: UROLOGY | Facility: AMBULATORY SURGERY CENTER | Age: 59
End: 2024-12-17
Payer: COMMERCIAL

## 2024-12-17 ENCOUNTER — TELEPHONE (OUTPATIENT)
Dept: UROLOGY | Facility: AMBULATORY SURGERY CENTER | Age: 59
End: 2024-12-17

## 2024-12-17 VITALS
HEART RATE: 65 BPM | DIASTOLIC BLOOD PRESSURE: 70 MMHG | OXYGEN SATURATION: 97 % | BODY MASS INDEX: 29.62 KG/M2 | WEIGHT: 200 LBS | SYSTOLIC BLOOD PRESSURE: 120 MMHG | HEIGHT: 69 IN

## 2024-12-17 DIAGNOSIS — R79.89 LOW TESTOSTERONE: ICD-10-CM

## 2024-12-17 DIAGNOSIS — N52.9 ERECTILE DYSFUNCTION, UNSPECIFIED ERECTILE DYSFUNCTION TYPE: Primary | ICD-10-CM

## 2024-12-17 DIAGNOSIS — Z12.5 SCREENING FOR PROSTATE CANCER: ICD-10-CM

## 2024-12-17 PROCEDURE — 99213 OFFICE O/P EST LOW 20 MIN: CPT

## 2024-12-17 RX ORDER — TADALAFIL 5 MG/1
5 TABLET ORAL DAILY PRN
Qty: 20 TABLET | Refills: 1 | Status: SHIPPED | OUTPATIENT
Start: 2024-12-17

## 2024-12-17 NOTE — PROGRESS NOTES
Office Visit- Urology  Omar Henry Jr. 1965 MRN: [id number]      Assessment/Discussion/Plan    59 y.o. male managed by     Concern for low testosterone  -patient describes symptoms of ED, low sex drive, fatigue  -No testosterone testing on file.  Patient states a number of years ago that he was on testosterone placement therapy through a testosterone clinic  -No recent labs on file  -Will obtain a testosterone level, prolactin, CBC, LH  -Discussed definition of low testosterone per AUA guidelines with 2 consecutive readings below 300 in the early morning between 7 and 9 AM.  -If testosterone comes back low will obtain an updated testosterone  -Will have patient return to discuss results    2.  Prostate cancer screening  -Last PSA was 1.1 in April 2019  -Due for updated PSA  -Prostate examination when patient returns to the office    3.  ED  -Trial of Cialis 5 mg  -Discussed administration and possible side effects.  Patient denies cardiac history or use of nitroglycerin      Chief Complaint:   Omar is a 59 y.o. male presenting to the office for new evaluation of low testosterone        Subjective    Patient is a 59-year-old male with a history of low testosterone on testosterone placement therapy who presents to the office today accompanied by his spouse for evaluation of hypogonadal symptoms.  He describes symptoms of low sex drive, ED, fatigue, loss of muscle mass.  He denies hot flashes he states that he had a remote history of topical testosterone.  More recently a couple years ago he was on testosterone patient therapy through testosterone injections by testosterone clinic he states that he was on this for about 6 months,.  He felt better on the testosterone injections rather than the topical testosterone.  He largely denies bothersome urinary tract symptoms last PSA was 1.1 in April 2019 please he had a grandfather who was diagnosed with prostate cancer in his 90s.  Patient has not trialed medication  for ED    AUA SYMPTOM SCORE      Flowsheet Row Most Recent Value   AUA SYMPTOM SCORE    How often have you had a sensation of not emptying your bladder completely after you finished urinating? 1 (P)     How often have you had to urinate again less than two hours after you finished urinating? 2 (P)     How often have you found you stopped and started again several times when you urinate? 3 (P)     How often have you found it difficult to postpone urination? 0 (P)     How often have you had a weak urinary stream? 0 (P)     How often have you had to push or strain to begin urination? 0 (P)     How many times did you most typically get up to urinate from the time you went to bed at night until the time you got up in the morning? 1 (P)     Quality of Life: If you were to spend the rest of your life with your urinary condition just the way it is now, how would you feel about that? 1 (P)     AUA SYMPTOM SCORE 7 (P)              ROS:   Review of Systems   Constitutional: Negative.  Negative for chills, fatigue and fever.   HENT: Negative.     Respiratory:  Negative for shortness of breath.    Cardiovascular:  Negative for chest pain.   Gastrointestinal: Negative.  Negative for abdominal pain.   Endocrine: Negative.    Musculoskeletal: Negative.    Skin: Negative.    Neurological: Negative.  Negative for dizziness and light-headedness.   Hematological: Negative.    Psychiatric/Behavioral: Negative.           Past Medical History  Past Medical History:   Diagnosis Date    Chronic sinusitis     LAST ASSESSED: [id number]    Closed fracture of lower jaw bone (HCC)     Colon polyp     COVID     January 2022    Disease of thyroid gland     Hyperlipidemia     Hypertension     Seasonal allergies     Seasonal allergies     Sleep apnea     Doesn't require cpap post UPP surgery       Past Surgical History  Past Surgical History:   Procedure Laterality Date    COLONOSCOPY      HERNIA REPAIR      MANDIBLE FRACTURE SURGERY      as a child     TX COLONOSCOPY FLX DX W/COLLJ SPEC WHEN PFRMD N/A 2/20/2017    Procedure: COLONOSCOPY;  Surgeon: Sylvester Simpson MD;  [facility];  Service: Gastroenterology    TX RINSJ RPTD BICEPS/TRICEPS TDN DSTL W/WO TDN GRF  8/31/2017    Procedure: RIGHT DISTAL BICEPS TENDON REPAIR;  Surgeon: Jhoan Britt MD;  [facility];  Service: Orthopedics    TX RPR UMBILICAL HRNA 5 YRS/> REDUCIBLE N/A 11/4/2021    Procedure: UMBILICAL HERNIA REPAIR;  Surgeon: Zain Peña MD;  [facility];  Service: General    SINUS SURGERY      TONSILLECTOMY      TONSILLECTOMY AND ADENOIDECTOMY      UVULOPALATOPHARYNGOPLASTY      WISDOM TOOTH EXTRACTION         Past Family History  Family History   Problem Relation Age of Onset    Diabetes Mother     Hypertension Mother     Cancer Mother     Heart attack Father 50        ACUTE MI    Cardiomyopathy Father     Heart disease Father     Hypertension Brother     Drug abuse Brother     Cancer Maternal Grandfather     Colon cancer Maternal Grandfather     Stroke Paternal Grandfather         SYNDROME    Anuerysm Neg Hx     Clotting disorder Neg Hx     Heart failure Neg Hx     Coronary artery disease Neg Hx     Sudden death Neg Hx         scd       Past Social history  Social History     Socioeconomic History    Marital status: /Civil Union     Spouse name: Not on file    Number of children: Not on file    Years of education: Not on file    Highest education level: Not on file   Occupational History    Not on file   Tobacco Use    Smoking status: Never    Smokeless tobacco: Never   Vaping Use    Vaping status: Never Used   Substance and Sexual Activity    Alcohol use: Yes     Comment: social, events or weekends    Drug use: No    Sexual activity: Yes   Other Topics Concern    Not on file   Social History Narrative    Not on file     Social Drivers of Health     Financial Resource Strain: Not on file   Food Insecurity: Not on file   Transportation Needs: Not on file    Physical Activity: Not on file   Stress: Not on file   Social Connections: Not on file   Intimate Partner Violence: Not on file   Housing Stability: Not on file       Current Medications  Current Outpatient Medications   Medication Sig Dispense Refill    Cholecalciferol (VITAMIN D3) 1000 units CAPS Take by mouth      co-enzyme Q-10 100 mg capsule Take 1 capsule (100 mg total) by mouth 2 (two) times a day      fluticasone (FLONASE) 50 mcg/act nasal spray Use 2 sprays in each nostril every day 16 g 2    fluticasone (FLONASE) 50 mcg/act nasal spray 2 sprays into each nostril 2 (two) times a day 32 g 0    gabapentin (Neurontin) 100 mg capsule Take 1 capsule (100 mg total) by mouth daily at bedtime (Patient taking differently: Take 100 mg by mouth as needed) 90 capsule 1    levocetirizine (XYZAL) 5 MG tablet Take 1 tablet (5 mg total) by mouth every evening 90 tablet 1    levothyroxine 125 mcg tablet TAKE 1 TABLET (125 MCG TOTAL) BY MOUTH DAILY BEFORE BREAKFAST 30 MINUTES PRIOR 90 tablet 1    lisinopril (ZESTRIL) 5 mg tablet Take 1 tablet (5 mg total) by mouth daily 90 tablet 3    Magnesium 100 MG CAPS Take by mouth daily      Multiple Vitamin (DAILY VALUE MULTIVITAMIN) TABS Take by mouth      omega-3-acid ethyl esters (LOVAZA) 1 g capsule Take 2 capsules (2 g total) by mouth 2 (two) times a day 360 capsule 1    hydrocortisone (ANUSOL-HC) 2.5 % rectal cream Apply topically 2 (two) times a day (Patient not taking: Reported on 11/26/2024) 28 g 2    rosuvastatin (CRESTOR) 20 MG tablet Take 1 tablet (20 mg total) by mouth daily (Patient not taking: Reported on 12/17/2024) 90 tablet 3     No current facility-administered medications for this visit.       Allergies  Allergies   Allergen Reactions    Dust Mite Extract Sneezing     --sinus infection     Other Other (See Comments)     Annotation - [id number]: Hayfever-sinus infection        OBJECTIVE    Vitals   Vitals:    12/17/24 0843   BP: 120/70   BP Location: Left arm  "  Patient Position: Sitting   Cuff Size: Standard   Pulse: 65   SpO2: 97%   Weight: 90.7 kg (200 lb)   Height: 5' 9\" (1.753 m)       PVR:    Physical Exam  Constitutional:       General: He is not in acute distress.     Appearance: Normal appearance. He is normal weight. He is not ill-appearing or toxic-appearing.   HENT:      Head: Normocephalic and atraumatic.   Eyes:      Conjunctiva/sclera: Conjunctivae normal.   Cardiovascular:      Rate and Rhythm: Normal rate.   Pulmonary:      Effort: Pulmonary effort is normal. No respiratory distress.   Skin:     General: Skin is warm and dry.   Neurological:      General: No focal deficit present.      Mental Status: He is alert and oriented to person, place, and time.      Cranial Nerves: No cranial nerve deficit.   Psychiatric:         Mood and Affect: Mood normal.         Behavior: Behavior normal.         Thought Content: Thought content normal.          Labs:     Lab Results   Component Value Date    PSA 1.1 04/26/2019    PSA 0.9 11/24/2017     Lab Results   Component Value Date    CREATININE 1.01 11/23/2024      Lab Results   Component Value Date    HGBA1C 5.4 07/29/2022     Lab Results   Component Value Date    GLUCOSE 101 (H) 11/24/2017    CALCIUM 9.5 11/23/2024     11/24/2017    K 4.9 11/23/2024    CO2 31 11/23/2024     11/23/2024    BUN 10 11/23/2024    CREATININE 1.01 11/23/2024       I have personally reviewed all pertinent lab results and reviewed with patient    Imaging       Edgardo Hearn PA-C  Date: 12/17/2024 Time: 8:47 AM  [facility]    This note was written using fluency dictation software. Please excuse any resulting minor grammatical errors.      "

## 2024-12-17 NOTE — TELEPHONE ENCOUNTER
Edgardo saw pt today and wants him back in 4 weeks for lab review    At check out verbally confirmed 1/21 at 8:40am - CARINA Reynoso   And gave appt card      Please add to schedule. Thanks

## 2025-01-03 ENCOUNTER — APPOINTMENT (OUTPATIENT)
Dept: LAB | Facility: MEDICAL CENTER | Age: 60
End: 2025-01-03
Payer: COMMERCIAL

## 2025-01-03 DIAGNOSIS — Z12.5 SCREENING FOR PROSTATE CANCER: ICD-10-CM

## 2025-01-03 DIAGNOSIS — R79.89 LOW TESTOSTERONE: ICD-10-CM

## 2025-01-03 DIAGNOSIS — E78.1 ESSENTIAL HYPERTRIGLYCERIDEMIA: ICD-10-CM

## 2025-01-03 DIAGNOSIS — E78.2 MIXED HYPERLIPIDEMIA: ICD-10-CM

## 2025-01-03 LAB
CHOLEST SERPL-MCNC: 237 MG/DL (ref ?–200)
ERYTHROCYTE [DISTWIDTH] IN BLOOD BY AUTOMATED COUNT: 13.2 % (ref 11.6–15.1)
EST. AVERAGE GLUCOSE BLD GHB EST-MCNC: 120 MG/DL
HBA1C MFR BLD: 5.8 %
HCT VFR BLD AUTO: 42.1 % (ref 36.5–49.3)
HDLC SERPL-MCNC: 47 MG/DL
HGB BLD-MCNC: 13.9 G/DL (ref 12–17)
LDLC SERPL CALC-MCNC: 151 MG/DL (ref 0–100)
LH SERPL-ACNC: 2.6 MIU/ML (ref 1.2–8.6)
MCH RBC QN AUTO: 29.9 PG (ref 26.8–34.3)
MCHC RBC AUTO-ENTMCNC: 33 G/DL (ref 31.4–37.4)
MCV RBC AUTO: 91 FL (ref 82–98)
NONHDLC SERPL-MCNC: 190 MG/DL
PLATELET # BLD AUTO: 259 THOUSANDS/UL (ref 149–390)
PMV BLD AUTO: 9.6 FL (ref 8.9–12.7)
PROLACTIN SERPL-MCNC: 15.23 NG/ML (ref 2.64–13.13)
PSA SERPL-MCNC: 0.91 NG/ML (ref 0–4)
RBC # BLD AUTO: 4.65 MILLION/UL (ref 3.88–5.62)
TESTOST SERPL-MSCNC: 311 NG/DL (ref 175–781)
TRIGL SERPL-MCNC: 194 MG/DL (ref ?–150)
TSH SERPL DL<=0.05 MIU/L-ACNC: 5.29 UIU/ML (ref 0.45–4.5)
WBC # BLD AUTO: 10.35 THOUSAND/UL (ref 4.31–10.16)

## 2025-01-03 PROCEDURE — 85027 COMPLETE CBC AUTOMATED: CPT

## 2025-01-03 PROCEDURE — 84403 ASSAY OF TOTAL TESTOSTERONE: CPT

## 2025-01-03 PROCEDURE — 83002 ASSAY OF GONADOTROPIN (LH): CPT

## 2025-01-03 PROCEDURE — 84439 ASSAY OF FREE THYROXINE: CPT

## 2025-01-03 PROCEDURE — 84146 ASSAY OF PROLACTIN: CPT

## 2025-01-03 PROCEDURE — G0103 PSA SCREENING: HCPCS

## 2025-01-03 PROCEDURE — 84443 ASSAY THYROID STIM HORMONE: CPT

## 2025-01-03 PROCEDURE — 80061 LIPID PANEL: CPT

## 2025-01-03 PROCEDURE — 83036 HEMOGLOBIN GLYCOSYLATED A1C: CPT

## 2025-01-03 PROCEDURE — 36415 COLL VENOUS BLD VENIPUNCTURE: CPT

## 2025-01-04 LAB — T4 FREE SERPL-MCNC: 0.84 NG/DL (ref 0.61–1.12)

## 2025-01-06 ENCOUNTER — RESULTS FOLLOW-UP (OUTPATIENT)
Dept: UROLOGY | Facility: CLINIC | Age: 60
End: 2025-01-06

## 2025-01-06 DIAGNOSIS — D72.829 LEUKOCYTOSIS, UNSPECIFIED TYPE: Primary | ICD-10-CM

## 2025-01-06 DIAGNOSIS — R79.89 ELEVATED PROLACTIN LEVEL: ICD-10-CM

## 2025-01-06 NOTE — TELEPHONE ENCOUNTER
Called/spoke to patient relaying Edgardo's message with regards to his recent blood work:   Lab work demonstrated a mildly elevated prolactin.  We asked patient to get updated prolactin in the next few weeks while avoiding any significant lifting. The order was placed in the system.He also has mild leukocytosis and when asked if he had any symptoms of infection, patient did state that he is just getting over bronchitis. He can obtain an updated white blood cell count when he goes for his other tests, and this, too, was ordered already. If this is still persistently elevated, he will need to follow-up with his family doctor about this for further evaluation.  Testosterone level is above the cutoff of 300 and does not meet the criteria for consideration of testosterone placement therapy. He was in understanding of the information given.         ----- Message from Edgardo Hearn PA-C sent at 1/6/2025 12:04 PM EST -----  Lab work demonstrated a mildly elevated prolactin.  We asked patient to get updated prolactin in the next few weeks while avoiding any significant lifting.  He also has mild leukocytosis.  Please inquire if he is having any signs or symptoms of infection anywhere.  Can obtain an updated white blood cell count to if this still persistently elevated he will need to follow-up with his family doctor about this for further evaluation    Testosterone above the cutoff of 300.  Can continue to observe testosterone levels over time but for now he would not meet the criteria for consideration of testosterone placement therapy

## 2025-01-14 ENCOUNTER — OFFICE VISIT (OUTPATIENT)
Dept: CARDIOLOGY CLINIC | Facility: MEDICAL CENTER | Age: 60
End: 2025-01-14
Payer: COMMERCIAL

## 2025-01-14 VITALS
HEART RATE: 88 BPM | SYSTOLIC BLOOD PRESSURE: 128 MMHG | DIASTOLIC BLOOD PRESSURE: 78 MMHG | WEIGHT: 204 LBS | HEIGHT: 69 IN | OXYGEN SATURATION: 96 % | BODY MASS INDEX: 30.21 KG/M2

## 2025-01-14 DIAGNOSIS — Z82.49 FAMILY HISTORY OF MYOCARDIAL INFARCTION: ICD-10-CM

## 2025-01-14 DIAGNOSIS — E78.2 MIXED HYPERLIPIDEMIA: ICD-10-CM

## 2025-01-14 DIAGNOSIS — E78.1 ESSENTIAL HYPERTRIGLYCERIDEMIA: Primary | ICD-10-CM

## 2025-01-14 DIAGNOSIS — I10 BENIGN ESSENTIAL HYPERTENSION: ICD-10-CM

## 2025-01-14 PROCEDURE — 99214 OFFICE O/P EST MOD 30 MIN: CPT | Performed by: INTERNAL MEDICINE

## 2025-01-14 RX ORDER — EZETIMIBE 10 MG/1
10 TABLET ORAL DAILY
Qty: 90 TABLET | Refills: 3 | Status: SHIPPED | OUTPATIENT
Start: 2025-01-14

## 2025-01-14 NOTE — PROGRESS NOTES
[facility]  Office followup  Omar Henry Jr. 59 y.o. male MRN: [id number]      Impression:    Hypertension  In 2022 he dropped 23 pounds, had lightheadedness on medical therapy and his lisinopril was stopped at that time  At a current weight of 204 pounds, blood pressure 128/78  He is currently still taking low-dose lisinopril 5 mg daily    Mixed hyperlipidemia  He is on coenzyme every 10 and Lovaza, recently stopped rosuvastatin out of concern for muscle/thigh pain  In the past he has had a normal CPK on treatment, last assessed in 2023  Current LDL poorly controlled at 151 with a strong family history of CAD  Triglycerides are 194,    Family history of premature CAD/MI  Normal stress test 11/18  Normal stress test 2020  0 coronary calcium 2020      Plan    Check CPK, CRP  Start Zetia 10 mg daily  Keep taking Lovaza  Keep taking co-Q10  Check lipid panel in 3 months  1 year follow-up    HPI: Omar Henry Jr. is a 59 y.o. year old male with a significant family history of premature coronary artery disease/MI, dad requiring transplant for severe ischemic cardiomyopathy, and personally has hypertension, and mixed hyperlipidemia.      Recently reestablished with our practice after being gone for 2-1/2 years.  His blood pressure is excellent he is keeping a fairly low body weight of about 204 pounds although this is a BMI of 30.  He had originally reached 198 on Mounjaro and had intended losing more.  He had intermittent leg discomfort, only recently stopped rosuvastatin, the discomfort has persisted.  His LDL is up to 151, triglycerides 194, he is taking Lovaza and co-Q10.  He has no cardiac symptoms.  He has had a number of stress test in the past.  Last stress test was a treadmill stress test 2/23 which was normal with a high workload of 13 minutes.        Review of Systems   Constitutional:  Negative for appetite change, diaphoresis, fatigue and fever.   Respiratory:  Negative for chest tightness,  shortness of breath and wheezing.    Cardiovascular:  Negative for chest pain, palpitations and leg swelling.   Gastrointestinal:  Negative for abdominal pain and blood in stool.   Musculoskeletal:  Positive for myalgias. Negative for arthralgias and joint swelling.   Skin:  Negative for rash.   Neurological:  Negative for dizziness, syncope and light-headedness.         Past Medical History:   Diagnosis Date   • Allergic rhinitis Age 21   • Chronic sinusitis     LAST ASSESSED: [id number]   • Closed fracture of lower jaw bone (HCC)    • Colon polyp    • COVID     January 2022   • Disease of thyroid gland    • Ear problems     Ringing in the ears and loss of hearing.   • Hyperlipidemia    • Hypertension    • Nasal congestion    • Nasal obstruction     Yes, increaseling over time since 2012 PPP surgery.   • Seasonal allergies    • Seasonal allergies    • Sleep apnea     Doesn't require cpap post UPP surgery   • Sleep difficulties     Having to mouth breath while sleeping in prder to be able to get enough air.   • Tinnitus     More so in the last 5 years or so.     Past Surgical History:   Procedure Laterality Date   • COLONOSCOPY     • HERNIA REPAIR     • MANDIBLE FRACTURE SURGERY      as a child   • CT COLONOSCOPY FLX DX W/COLLJ SPEC WHEN PFRMD N/A 2/20/2017    Procedure: COLONOSCOPY;  Surgeon: Sylvester Simpson MD;  [facility];  Service: Gastroenterology   • CT RINSJ RPTD BICEPS/TRICEPS TDN DSTL W/WO TDN GRF  8/31/2017    Procedure: RIGHT DISTAL BICEPS TENDON REPAIR;  Surgeon: Jhoan Britt MD;  [facility];  Service: Orthopedics   • CT RPR UMBILICAL HRNA 5 YRS/> REDUCIBLE N/A 11/4/2021    Procedure: UMBILICAL HERNIA REPAIR;  Surgeon: Zain Peña MD;  [facility];  Service: General   • SINUS SURGERY     • TONSILLECTOMY     • TONSILLECTOMY AND ADENOIDECTOMY     • UVULOPALATOPHARYNGOPLASTY     • WISDOM TOOTH EXTRACTION       Social History     Substance and Sexual Activity   Alcohol  Use Not Currently    Comment: social, events or weekends     Social History     Substance and Sexual Activity   Drug Use No     Social History     Tobacco Use   Smoking Status Never   Smokeless Tobacco Never     Family History   Problem Relation Age of Onset   • Diabetes Mother    • Hypertension Mother    • Cancer Mother         Same father as grandfather who changed his name.   • Heart attack Father 50        ACUTE MI   • Cardiomyopathy Father    • Heart disease Father    • Heart failure Father         Heart attack at 50 and heart transplant at 51. Passed away at 65   • Hypertension Brother    • Drug abuse Brother    • Cancer Maternal Grandfather    • Colon cancer Maternal Grandfather    • Stroke Paternal Grandfather         SYNDROME   • Anuerysm Neg Hx    • Clotting disorder Neg Hx    • Coronary artery disease Neg Hx    • Sudden death Neg Hx         scd       Allergies:  Allergies   Allergen Reactions   • Dust Mite Extract Sneezing     --sinus infection    • Other Other (See Comments)     Annotation - [id number]: Hayfever-sinus infection        Medications:     Current Outpatient Medications:   •  Cholecalciferol (VITAMIN D3) 1000 units CAPS, Take by mouth, Disp: , Rfl:   •  co-enzyme Q-10 100 mg capsule, Take 1 capsule (100 mg total) by mouth 2 (two) times a day, Disp: , Rfl:   •  ezetimibe (ZETIA) 10 mg tablet, Take 1 tablet (10 mg total) by mouth daily, Disp: 90 tablet, Rfl: 3  •  fluticasone (FLONASE) 50 mcg/act nasal spray, Use 2 sprays in each nostril every day, Disp: 16 g, Rfl: 2  •  fluticasone (FLONASE) 50 mcg/act nasal spray, SPRAY 2 SPRAYS INTO EACH NOSTRIL 2 TIMES A DAY., Disp: 48 mL, Rfl: 1  •  gabapentin (Neurontin) 100 mg capsule, Take 1 capsule (100 mg total) by mouth daily at bedtime (Patient taking differently: Take 100 mg by mouth as needed), Disp: 90 capsule, Rfl: 1  •  levocetirizine (XYZAL) 5 MG tablet, Take 1 tablet (5 mg total) by mouth every evening, Disp: 90 tablet, Rfl: 1  •   levothyroxine 125 mcg tablet, TAKE 1 TABLET (125 MCG TOTAL) BY MOUTH DAILY BEFORE BREAKFAST 30 MINUTES PRIOR, Disp: 90 tablet, Rfl: 1  •  lisinopril (ZESTRIL) 5 mg tablet, Take 1 tablet (5 mg total) by mouth daily, Disp: 90 tablet, Rfl: 3  •  Magnesium 100 MG CAPS, Take by mouth daily, Disp: , Rfl:   •  Multiple Vitamin (DAILY VALUE MULTIVITAMIN) TABS, Take by mouth, Disp: , Rfl:   •  omega-3-acid ethyl esters (LOVAZA) 1 g capsule, Take 2 capsules (2 g total) by mouth 2 (two) times a day, Disp: 360 capsule, Rfl: 1  •  tadalafil (CIALIS) 5 MG tablet, Take 1 tablet (5 mg total) by mouth daily as needed for erectile dysfunction, Disp: 20 tablet, Rfl: 1  •  hydrocortisone (ANUSOL-HC) 2.5 % rectal cream, Apply topically 2 (two) times a day (Patient not taking: Reported on 11/26/2024), Disp: 28 g, Rfl: 2  •  rosuvastatin (CRESTOR) 20 MG tablet, Take 1 tablet (20 mg total) by mouth daily (Patient not taking: Reported on 12/17/2024), Disp: 90 tablet, Rfl: 3      Vitals:    01/14/25 1141   BP: 128/78   Pulse: 88   SpO2: 96%     Weight (last 2 days)     Date/Time Weight    01/14/25 1141 92.5 (204)        Physical Exam  Constitutional:       General: He is not in acute distress.     Appearance: He is not diaphoretic.   HENT:      Head: Normocephalic and atraumatic.   Eyes:      General: No scleral icterus.     Conjunctiva/sclera: Conjunctivae normal.   Neck:      Vascular: No JVD.   Cardiovascular:      Rate and Rhythm: Normal rate and regular rhythm.      Heart sounds: Normal heart sounds. No murmur heard.  Pulmonary:      Effort: Pulmonary effort is normal. No respiratory distress.      Breath sounds: Normal breath sounds. No decreased breath sounds, wheezing, rhonchi or rales.   Musculoskeletal:      Cervical back: Normal range of motion.      Right lower leg: Normal. No edema.      Left lower leg: Normal. No edema.   Skin:     General: Skin is warm and dry.   Neurological:      Mental Status: He is alert and oriented to  "person, place, and time.           Laboratory Studies:  Lab Results   Component Value Date    HGBA1C 5.8 (H) 01/03/2025    HGBA1C 5.4 07/29/2022    HGBA1C 5.8 (H) 10/28/2021    HGBA1C 5.7 03/01/2021     11/24/2017     05/05/2017     12/16/2016    K 4.9 11/23/2024    K 4.3 08/24/2023    K 4.5 07/29/2022    K 4.4 07/07/2021    K 4.5 10/05/2020     11/23/2024     08/24/2023     07/29/2022     07/07/2021     10/05/2020    CO2 31 11/23/2024    CO2 31 08/24/2023    CO2 25 07/29/2022    CO2 27 07/07/2021    CO2 25 10/05/2020    GLUCOSE 101 (H) 11/24/2017    GLUCOSE 101 (H) 05/05/2017    GLUCOSE 94 12/16/2016    CREATININE 1.01 11/23/2024    CREATININE 1.07 08/24/2023    CREATININE 1.14 07/29/2022    CREATININE 1.07 07/07/2021    CREATININE 1.13 10/05/2020    CREATININE 1.10 11/24/2017    CREATININE 1.21 05/05/2017    CREATININE 1.25 12/16/2016    BUN 10 11/23/2024    BUN 21 08/24/2023    BUN 15 07/29/2022    BUN 15 07/07/2021    BUN 25 (H) 10/05/2020     Lab Results   Component Value Date    WBC 10.35 (H) 01/03/2025    WBC 5.73 06/27/2014    RBC 4.65 01/03/2025    RBC 4.72 06/27/2014    HGB 13.9 01/03/2025    HGB 14.0 06/27/2014    HCT 42.1 01/03/2025    HCT 40.9 06/27/2014    MCV 91 01/03/2025    MCV 87 06/27/2014    MCH 29.9 01/03/2025    MCH 29.7 06/27/2014    RDW 13.2 01/03/2025    RDW 13.1 06/27/2014     01/03/2025     06/27/2014     NT-proBNP: No results found for: \"NTBNP\"   CBC:  Lab Results   Component Value Date    WBC 10.35 (H) 01/03/2025    WBC 5.73 06/27/2014    RBC 4.65 01/03/2025    RBC 4.72 06/27/2014    HGB 13.9 01/03/2025    HGB 14.0 06/27/2014    HCT 42.1 01/03/2025    HCT 40.9 06/27/2014    MCV 91 01/03/2025    MCV 87 06/27/2014    MCH 29.9 01/03/2025    MCH 29.7 06/27/2014    RDW 13.2 01/03/2025    RDW 13.1 06/27/2014     01/03/2025     06/27/2014     Coags:    Lipid Profile:   Lab Results   Component Value Date    CHOL 168 " "11/24/2017     Lab Results   Component Value Date    HDL 47 01/03/2025     Lab Results   Component Value Date    LDLCALC 151 (H) 01/03/2025     Lab Results   Component Value Date    TRIG 194 (H) 01/03/2025       Cardiac testing:     EKG reviewed personally:   No results found for this visit on 01/14/25.        Stress test  11/18-stress treadmill normal  2020-stress treadmill normal  2/23-normal treadmill stress test    Calcium score CT  2020-calcium score equals 0    Stuart Sellers MD    Portions of the record may have been created with voice recognition software.  Occasional wrong word or \"sound a like\" substitutions may have occurred due to the inherent limitations of voice recognition software.  Read the chart carefully and recognize, using context, where substitutions have occurred.  "

## 2025-01-17 ENCOUNTER — APPOINTMENT (OUTPATIENT)
Dept: LAB | Facility: MEDICAL CENTER | Age: 60
End: 2025-01-17
Payer: COMMERCIAL

## 2025-01-17 DIAGNOSIS — D72.829 LEUKOCYTOSIS, UNSPECIFIED TYPE: ICD-10-CM

## 2025-01-17 DIAGNOSIS — E78.1 ESSENTIAL HYPERTRIGLYCERIDEMIA: ICD-10-CM

## 2025-01-17 DIAGNOSIS — R79.89 ELEVATED PROLACTIN LEVEL: ICD-10-CM

## 2025-01-17 LAB
CK SERPL-CCNC: 73 U/L (ref 39–308)
CRP SERPL QL: 2 MG/L
ERYTHROCYTE [DISTWIDTH] IN BLOOD BY AUTOMATED COUNT: 13 % (ref 11.6–15.1)
HCT VFR BLD AUTO: 41.5 % (ref 36.5–49.3)
HGB BLD-MCNC: 13.8 G/DL (ref 12–17)
MCH RBC QN AUTO: 29.7 PG (ref 26.8–34.3)
MCHC RBC AUTO-ENTMCNC: 33.3 G/DL (ref 31.4–37.4)
MCV RBC AUTO: 89 FL (ref 82–98)
PLATELET # BLD AUTO: 240 THOUSANDS/UL (ref 149–390)
PMV BLD AUTO: 10.2 FL (ref 8.9–12.7)
PROLACTIN SERPL-MCNC: 10.62 NG/ML (ref 2.64–13.13)
RBC # BLD AUTO: 4.64 MILLION/UL (ref 3.88–5.62)
WBC # BLD AUTO: 6.35 THOUSAND/UL (ref 4.31–10.16)

## 2025-01-17 PROCEDURE — 85027 COMPLETE CBC AUTOMATED: CPT

## 2025-01-17 PROCEDURE — 83695 ASSAY OF LIPOPROTEIN(A): CPT | Performed by: INTERNAL MEDICINE

## 2025-01-17 PROCEDURE — 82550 ASSAY OF CK (CPK): CPT | Performed by: INTERNAL MEDICINE

## 2025-01-17 PROCEDURE — 84146 ASSAY OF PROLACTIN: CPT

## 2025-01-17 PROCEDURE — 86140 C-REACTIVE PROTEIN: CPT

## 2025-01-17 PROCEDURE — 36415 COLL VENOUS BLD VENIPUNCTURE: CPT | Performed by: INTERNAL MEDICINE

## 2025-01-20 ENCOUNTER — RESULTS FOLLOW-UP (OUTPATIENT)
Dept: UROLOGY | Facility: CLINIC | Age: 60
End: 2025-01-20

## 2025-01-20 LAB — LPA SERPL-SCNC: 17.7 NMOL/L

## 2025-01-20 NOTE — TELEPHONE ENCOUNTER
Called and spoke directly to this pt and relayed this message in detail. Pt stated he understood the message as relayed to him. If this pt calls back please relay these messages again. Thank you            ----- Message from Edgardo Hearn PA-C sent at 1/20/2025 12:48 PM EST -----  Please call patient to inform him of both his high prolactin level and his high white blood cell count has resolved on updated blood work

## 2025-02-06 ENCOUNTER — HOSPITAL ENCOUNTER (OUTPATIENT)
Dept: NON INVASIVE DIAGNOSTICS | Facility: MEDICAL CENTER | Age: 60
Discharge: HOME | End: 2025-02-06

## 2025-02-06 DIAGNOSIS — I10 BENIGN ESSENTIAL HYPERTENSION: ICD-10-CM

## 2025-02-06 DIAGNOSIS — Z82.49 FAMILY HISTORY OF MYOCARDIAL INFARCTION: ICD-10-CM

## 2025-02-07 ENCOUNTER — OFFICE VISIT (OUTPATIENT)
Dept: FAMILY MEDICINE CLINIC | Facility: CLINIC | Age: 60
End: 2025-02-07
Payer: COMMERCIAL

## 2025-02-07 VITALS
RESPIRATION RATE: 16 BRPM | HEART RATE: 66 BPM | DIASTOLIC BLOOD PRESSURE: 88 MMHG | HEIGHT: 69 IN | TEMPERATURE: 97.4 F | BODY MASS INDEX: 29.92 KG/M2 | WEIGHT: 202 LBS | OXYGEN SATURATION: 98 % | SYSTOLIC BLOOD PRESSURE: 128 MMHG

## 2025-02-07 DIAGNOSIS — I10 BENIGN ESSENTIAL HYPERTENSION: ICD-10-CM

## 2025-02-07 DIAGNOSIS — M79.605 BILATERAL LEG PAIN: ICD-10-CM

## 2025-02-07 DIAGNOSIS — M79.604 BILATERAL LEG PAIN: ICD-10-CM

## 2025-02-07 DIAGNOSIS — Z00.00 ANNUAL PHYSICAL EXAM: Primary | ICD-10-CM

## 2025-02-07 PROCEDURE — 99396 PREV VISIT EST AGE 40-64: CPT | Performed by: FAMILY MEDICINE

## 2025-02-07 NOTE — PROGRESS NOTES
Adult Annual Physical  Name: Omar Henry Jr.      : 1965      MRN: [id number]  Encounter Provider: Ed Damon MD  Encounter Date: 2025   Encounter department: [facility]    Assessment & Plan  Annual physical exam    Benign essential hypertension    Bilateral leg pain    Orders:    Ambulatory Referral to Orthopedic Surgery; Future      Immunizations and preventive care screenings were discussed with patient today. Appropriate education was printed on patient's after visit summary.    Discussed risks and benefits of prostate cancer screening. We discussed the controversial history of PSA screening for prostate cancer in the [place] as well as the risk of over detection and over treatment of prostate cancer by way of PSA screening.  The patient understands that PSA blood testing is an imperfect way to screen for prostate cancer and that elevated PSA levels in the blood may also be caused by infection, inflammation, prostatic trauma or manipulation, urological procedures, or by benign prostatic enlargement.    The role of the digital rectal examination in prostate cancer screening was also discussed and I discussed with him that there is large interobserver variability in the findings of digital rectal examination.    Counseling:  Alcohol/drug use: discussed moderation in alcohol intake, the recommendations for healthy alcohol use, and avoidance of illicit drug use.  Dental Health: discussed importance of regular tooth brushing, flossing, and dental visits.  Injury prevention: discussed safety/seat belts, safety helmets, smoke detectors, carbon monoxide detectors, and smoking near bedding or upholstery.  Sexual health: discussed sexually transmitted diseases, partner selection, use of condoms, avoidance of unintended pregnancy, and contraceptive alternatives.  Exercise: the importance of regular exercise/physical activity was discussed. Recommend exercise 3-5 times per week  "for at least 30 minutes.          History of Present Illness       Adult Annual Physical:  Patient presents for annual physical. Pt here for annual physical  Continues to have leg pains, changed from crestor to Zetia.   There was one incidence of leg pain was so bad that even next day, he can feel aching from the muscle spasm.    Reviewed blood work from January.  Lipid panel and hba1c shows increase.     Diet and Physical Activity:  - Diet/Nutrition: well balanced diet and consuming 3-5 servings of fruits/vegetables daily.  - Exercise: no formal exercise.    Depression Screening:  - PHQ-2 Score: 0    General Health:  - Sleep:. looking into nasal surgery to help with sleep  - Hearing: normal hearing bilateral ears. hearing test recommeneded hearing aids  - Vision: wears glasses.  - Dental: brushes teeth twice daily and regular dental visits.     Health:    - Urinary symptoms: none.       Review of Systems   Constitutional:  Negative for appetite change, chills and fever.   HENT:  Positive for congestion. Negative for rhinorrhea and sore throat.    Eyes:  Negative for redness.   Respiratory:  Negative for shortness of breath.    Cardiovascular:  Negative for chest pain.   Gastrointestinal:  Negative for abdominal pain, constipation, diarrhea, nausea and vomiting.   Genitourinary:  Negative for dysuria.   Musculoskeletal:  Positive for myalgias.   Neurological:  Negative for dizziness, light-headedness and headaches.   Psychiatric/Behavioral:  Positive for sleep disturbance.        Objective   /88 (BP Location: Right arm, Patient Position: Sitting, Cuff Size: Large)   Pulse 66   Temp (!) 97.4 °F (36.3 °C) (Temporal)   Resp 16   Ht 5' 9.18\" (1.757 m)   Wt 91.6 kg (202 lb)   SpO2 98%   BMI 29.68 kg/m²     Physical Exam  Vitals reviewed.   Constitutional:       General: He is not in acute distress.     Appearance: Normal appearance. He is not ill-appearing, toxic-appearing or diaphoretic.   Cardiovascular: " "     Rate and Rhythm: Normal rate and regular rhythm.      Pulses: Normal pulses.      Heart sounds: Normal heart sounds. No murmur heard.  Pulmonary:      Effort: Pulmonary effort is normal. No respiratory distress.      Breath sounds: Normal breath sounds.   Abdominal:      General: Abdomen is flat. Bowel sounds are normal. There is no distension.      Palpations: Abdomen is soft.   Musculoskeletal:         General: No swelling or deformity.   Skin:     General: Skin is warm and dry.      Capillary Refill: Capillary refill takes less than 2 seconds.      Coloration: Skin is not jaundiced.   Neurological:      General: No focal deficit present.      Mental Status: He is alert and oriented to person, place, and time.   Psychiatric:         Mood and Affect: Mood normal.         Ed Damon M.D.  Family Medicine    Please excuse any \"sound-alike\" errors that may have ocurred during the process of dictation. Parts of this note have been dictated and there may be errors present in the transcription process. Thank you.    "

## 2025-02-07 NOTE — PATIENT INSTRUCTIONS
"Patient Education     Routine physical for adults   The Basics   Written by the doctors and editors at [facility]   What is a physical? -- A physical is a routine visit, or \"check-up,\" with your doctor. You might also hear it called a \"wellness visit\" or \"preventive visit.\"  During each visit, the doctor will:   Ask about your physical and mental health   Ask about your habits, behaviors, and lifestyle   Do an exam   Give you vaccines if needed   Talk to you about any medicines you take   Give advice about your health   Answer your questions  Getting regular check-ups is an important part of taking care of your health. It can help your doctor find and treat any problems you have. But it's also important for preventing health problems.  A routine physical is different from a \"sick visit.\" A sick visit is when you see a doctor because of a health concern or problem. Since physicals are scheduled ahead of time, you can think about what you want to ask the doctor.  How often should I get a physical? -- It depends on your age and health. In general, for people age 21 years and older:   If you are younger than 50 years, you might be able to get a physical every 3 years.   If you are 50 years or older, your doctor might recommend a physical every year.  If you have an ongoing health condition, like diabetes or high blood pressure, your doctor will probably want to see you more often.  What happens during a physical? -- In general, each visit will include:   Physical exam - The doctor or nurse will check your height, weight, heart rate, and blood pressure. They will also look at your eyes and ears. They will ask about how you are feeling and whether you have any symptoms that bother you.   Medicines - It's a good idea to bring a list of all the medicines you take to each doctor visit. Your doctor will talk to you about your medicines and answer any questions. Tell them if you are having any side effects that bother you. You " "should also tell them if you are having trouble paying for any of your medicines.   Habits and behaviors - This includes:   Your diet   Your exercise habits   Whether you smoke, drink alcohol, or use drugs   Whether you are sexually active   Whether you feel safe at home  Your doctor will talk to you about things you can do to improve your health and lower your risk of health problems. They will also offer help and support. For example, if you want to quit smoking, they can give you advice and might prescribe medicines. If you want to improve your diet or get more physical activity, they can help you with this, too.   Lab tests, if needed - The tests you get will depend on your age and situation. For example, your doctor might want to check your:   Cholesterol   Blood sugar   Iron level   Vaccines - The recommended vaccines will depend on your age, health, and what vaccines you already had. Vaccines are very important because they can prevent certain serious or deadly infections.   Discussion of screening - \"Screening\" means checking for diseases or other health problems before they cause symptoms. Your doctor can recommend screening based on your age, risk, and preferences. This might include tests to check for:   Cancer, such as breast, prostate, cervical, ovarian, colorectal, prostate, lung, or skin cancer   Sexually transmitted infections, such as chlamydia and gonorrhea   Mental health conditions like depression and anxiety  Your doctor will talk to you about the different types of screening tests. They can help you decide which screenings to have. They can also explain what the results might mean.   Answering questions - The physical is a good time to ask the doctor or nurse questions about your health. If needed, they can refer you to other doctors or specialists, too.  Adults older than 65 years often need other care, too. As you get older, your doctor will talk to you about:   How to prevent falling at " home   Hearing or vision tests   Memory testing   How to take your medicines safely   Making sure that you have the help and support you need at home  All topics are updated as new evidence becomes available and our peer review process is complete.  This topic retrieved from [employer] on: May 02, 2024.  Topic 866068 Version 1.0  Release: 32.4.3 - C32.122  © 2024 UpToDate, Inc. and/or its affiliates. All rights reserved.  Consumer Information Use and Disclaimer   Disclaimer: This generalized information is a limited summary of diagnosis, treatment, and/or medication information. It is not meant to be comprehensive and should be used as a tool to help the user understand and/or assess potential diagnostic and treatment options. It does NOT include all information about conditions, treatments, medications, side effects, or risks that may apply to a specific patient. It is not intended to be medical advice or a substitute for the medical advice, diagnosis, or treatment of a health care provider based on the health care provider's examination and assessment of a patient's specific and unique circumstances. Patients must speak with a health care provider for complete information about their health, medical questions, and treatment options, including any risks or benefits regarding use of medications. This information does not endorse any treatments or medications as safe, effective, or approved for treating a specific patient. UpToDate, Inc. and its affiliates disclaim any warranty or liability relating to this information or the use thereof.The use of this information is governed by the Terms of Use, available at https://www.woltersBevBucksuwer.com/en/know/clinical-effectiveness-terms. 2024© UpToDate, Inc. and its affiliates and/or licensors. All rights reserved.  Copyright   © 2024 UpToDate, Inc. and/or its affiliates. All rights reserved.

## 2025-02-11 ENCOUNTER — OFFICE VISIT (OUTPATIENT)
Dept: UROLOGY | Facility: AMBULATORY SURGERY CENTER | Age: 60
End: 2025-02-11

## 2025-02-11 VITALS
HEIGHT: 69 IN | BODY MASS INDEX: 30.36 KG/M2 | HEART RATE: 72 BPM | WEIGHT: 205 LBS | OXYGEN SATURATION: 99 % | SYSTOLIC BLOOD PRESSURE: 130 MMHG | DIASTOLIC BLOOD PRESSURE: 80 MMHG

## 2025-02-11 DIAGNOSIS — N52.9 ERECTILE DYSFUNCTION, UNSPECIFIED ERECTILE DYSFUNCTION TYPE: Primary | ICD-10-CM

## 2025-02-11 DIAGNOSIS — Z12.5 SCREENING FOR PROSTATE CANCER: ICD-10-CM

## 2025-02-11 DIAGNOSIS — R79.89 LOW TESTOSTERONE IN MALE: ICD-10-CM

## 2025-02-11 RX ORDER — TADALAFIL 20 MG/1
20 TABLET ORAL DAILY PRN
Qty: 30 TABLET | Refills: 3 | Status: SHIPPED | OUTPATIENT
Start: 2025-02-11

## 2025-02-11 NOTE — ASSESSMENT & PLAN NOTE
Patient reports a positive family history of prostate cancer with his grandfather being diagnosed  Patient's most recent PSA was performed 1/3/2025 was found to be 0.905.  Refer to PSA trend below  We discussed that the patient's PSA is overall low and stable and that we can plan to repeat his PSA in 1 year from his last.    Lab Results   Component Value Date    PSA 0.905 01/03/2025    PSA 1.1 04/26/2019    PSA 0.9 11/24/2017

## 2025-02-11 NOTE — PROGRESS NOTES
2/11/2025      Assessment and Plan    59 y.o. male managed by our office    Screening for prostate cancer  Patient reports a positive family history of prostate cancer with his grandfather being diagnosed  Patient's most recent PSA was performed 1/3/2025 was found to be 0.905.  Refer to PSA trend below  We discussed that the patient's PSA is overall low and stable and that we can plan to repeat his PSA in 1 year from his last.    Lab Results   Component Value Date    PSA 0.905 01/03/2025    PSA 1.1 04/26/2019    PSA 0.9 11/24/2017        Erectile dysfunction  Patient was initiated on Cialis 5 mg as needed 1 hour prior to sexual activity.  However, patient did not experience any adequate results.  We discussed increasing the Cialis to 20 mg as needed 1 hour prior to sexual activity.  We discussed side effects of facial flushing, nasal congestion, and headache.  Additionally, we discussed other PDE 5 inhibitor options including Viagra or Levitra.  Patient will trial Cialis 20 mg as needed 1 hour prior to sexual activity.  Patient will notify the office if the medication is not providing adequate results.    Low testosterone in male  Patient underwent testosterone testing on 1/3/2025 and was found to be within normal range at a value of 311.  We discussed that with his normal testosterone value, according to the AUA guidelines, that we should not initiate testosterone placement therapy for testosterone levels greater than 300.    Patient can undergo repeat testosterone level testing if requested, but no testosterone replacement therapy is indicated at this time.        History of Present Illness  Omar Henry Jr. is a 59 y.o. male here for evaluation of prostate cancer screening, erectile dysfunction, and concerns of low testosterone.  Patient was last seen in the office on 12/17/2024.  Patient reported low sex drive, ED, fatigue, and loss of muscle mass at his last office visit. A couple years ago he was on  testosterone patient therapy through testosterone injections by testosterone clinic he states that he was on this for about 6 months,.  He felt better on the testosterone injections rather than the topical testosterone.   The patient underwent testosterone testing on 1/3/2025 and found to be 311.  Patient underwent PSA testing on 1/3/2025 and found to be 0.905.  Patient also underwent prolactin testing on 1/3/2025 was found to be elevated a value of 15.23, and the patient was advised undergo repeat prolactin testing which returned within normal limits at a value of 10.62.  Today, the patient reports that he did utilize the Cialis 5 mg as needed, and not daily and noticed no benefit to his erectile dysfunction.  Patient reports that he is still unable to achieve or maintain a lasting erection.  Patient denies any lower urinary tract complaints.        Review of Systems   Constitutional:  Negative for chills and fever.   HENT:  Negative for ear pain and sore throat.    Eyes:  Negative for pain and visual disturbance.   Respiratory:  Negative for cough and shortness of breath.    Cardiovascular:  Negative for chest pain and palpitations.   Gastrointestinal:  Negative for abdominal pain and vomiting.   Genitourinary:  Negative for decreased urine volume, difficulty urinating, dysuria, flank pain, frequency, hematuria and urgency.   Musculoskeletal:  Negative for arthralgias and back pain.   Skin:  Negative for color change and rash.   Neurological:  Negative for seizures and syncope.   All other systems reviewed and are negative.          AUA SYMPTOM SCORE      Flowsheet Row Most Recent Value   AUA SYMPTOM SCORE    How often have you had a sensation of not emptying your bladder completely after you finished urinating? 2 (P)     How often have you had to urinate again less than two hours after you finished urinating? 1 (P)     How often have you found you stopped and started again several times when you urinate? 2 (P)    "  How often have you found it difficult to postpone urination? 0 (P)     How often have you had a weak urinary stream? 0 (P)     How often have you had to push or strain to begin urination? 0 (P)     How many times did you most typically get up to urinate from the time you went to bed at night until the time you got up in the morning? 1 (P)     Quality of Life: If you were to spend the rest of your life with your urinary condition just the way it is now, how would you feel about that? 0 (P)     AUA SYMPTOM SCORE 6 (P)               Vitals  Vitals:    02/11/25 1316   BP: 130/80   Patient Position: Sitting   Cuff Size: Adult   Pulse: 72   SpO2: 99%   Weight: 93 kg (205 lb)   Height: 5' 9\" (1.753 m)       Physical Exam  Vitals reviewed.   Constitutional:       General: He is not in acute distress.     Appearance: Normal appearance. He is not ill-appearing.   HENT:      Head: Normocephalic and atraumatic.      Nose: Nose normal.   Eyes:      General: No scleral icterus.  Pulmonary:      Effort: No respiratory distress.   Abdominal:      General: Abdomen is flat. There is no distension.      Palpations: Abdomen is soft.      Tenderness: There is no abdominal tenderness.   Musculoskeletal:         General: Normal range of motion.      Cervical back: Normal range of motion.   Skin:     General: Skin is warm.      Coloration: Skin is not jaundiced.   Neurological:      Mental Status: He is alert and oriented to person, place, and time.      Gait: Gait normal.   Psychiatric:         Mood and Affect: Mood normal.         Behavior: Behavior normal.           Past History  Past Medical History:   Diagnosis Date    Allergic rhinitis Age 21    Chronic sinusitis     LAST ASSESSED: [id number]    Closed fracture of lower jaw bone (HCC)     Colon polyp     COVID     January 2022    Disease of thyroid gland     Ear problems     Ringing in the ears and loss of hearing.    Hyperlipidemia     Hypertension     Nasal congestion     Nasal " obstruction     Yes, increaseling over time since 2012 PPP surgery.    Seasonal allergies     Seasonal allergies     Sleep apnea     Doesn't require cpap post UPP surgery    Sleep difficulties     Having to mouth breath while sleeping in prder to be able to get enough air.    Tinnitus     More so in the last 5 years or so.     Social History     Socioeconomic History    Marital status: /Civil Union     Spouse name: None    Number of children: None    Years of education: None    Highest education level: None   Occupational History    None   Tobacco Use    Smoking status: Never    Smokeless tobacco: Never   Vaping Use    Vaping status: Never Used   Substance and Sexual Activity    Alcohol use: Not Currently    Drug use: No    Sexual activity: Yes     Partners: Female     Birth control/protection: Male Sterilization   Other Topics Concern    None   Social History Narrative    None     Social Drivers of Health     Financial Resource Strain: Not on file   Food Insecurity: Not on file   Transportation Needs: Not on file   Physical Activity: Not on file   Stress: Not on file   Social Connections: Not on file   Intimate Partner Violence: Not on file   Housing Stability: Not on file     Social History     Tobacco Use   Smoking Status Never   Smokeless Tobacco Never     Family History   Problem Relation Age of Onset    Diabetes Mother     Hypertension Mother     Cancer Mother         Same father as grandfather who changed his name.    Heart attack Father 50        ACUTE MI    Cardiomyopathy Father     Heart disease Father     Heart failure Father         Heart attack at 50 and heart transplant at 51. Passed away at 65    Hypertension Brother     Drug abuse Brother     Cancer Maternal Grandfather     Colon cancer Maternal Grandfather     Stroke Paternal Grandfather         SYNDROME    Anuerysm Neg Hx     Clotting disorder Neg Hx     Coronary artery disease Neg Hx     Sudden death Neg Hx         scd       The following  portions of the patient's history were reviewed and updated as appropriate: allergies, current medications, past medical history, past social history, past surgical history and problem list.    Results  No results found for this or any previous visit (from the past hour).]  Lab Results   Component Value Date    PSA 0.905 01/03/2025    PSA 1.1 04/26/2019    PSA 0.9 11/24/2017     Lab Results   Component Value Date    GLUCOSE 101 (H) 11/24/2017    CALCIUM 9.5 11/23/2024     11/24/2017    K 4.9 11/23/2024    CO2 31 11/23/2024     11/23/2024    BUN 10 11/23/2024    CREATININE 1.01 11/23/2024     Lab Results   Component Value Date    WBC 6.35 01/17/2025    HGB 13.8 01/17/2025    HCT 41.5 01/17/2025    MCV 89 01/17/2025     01/17/2025

## 2025-02-11 NOTE — ASSESSMENT & PLAN NOTE
Patient underwent testosterone testing on 1/3/2025 and was found to be within normal range at a value of 311.  We discussed that with his normal testosterone value, according to the AUA guidelines, that we should not initiate testosterone placement therapy for testosterone levels greater than 300.    Patient can undergo repeat testosterone level testing if requested, but no testosterone replacement therapy is indicated at this time.

## 2025-02-11 NOTE — ASSESSMENT & PLAN NOTE
Patient was initiated on Cialis 5 mg as needed 1 hour prior to sexual activity.  However, patient did not experience any adequate results.  We discussed increasing the Cialis to 20 mg as needed 1 hour prior to sexual activity.  We discussed side effects of facial flushing, nasal congestion, and headache.  Additionally, we discussed other PDE 5 inhibitor options including Viagra or Levitra.  Patient will trial Cialis 20 mg as needed 1 hour prior to sexual activity.  Patient will notify the office if the medication is not providing adequate results.

## 2025-03-07 ENCOUNTER — CONSULT (OUTPATIENT)
Dept: FAMILY MEDICINE CLINIC | Facility: CLINIC | Age: 60
End: 2025-03-07
Payer: COMMERCIAL

## 2025-03-07 ENCOUNTER — APPOINTMENT (OUTPATIENT)
Dept: LAB | Facility: MEDICAL CENTER | Age: 60
End: 2025-03-07
Payer: COMMERCIAL

## 2025-03-07 VITALS
HEART RATE: 86 BPM | HEIGHT: 69 IN | BODY MASS INDEX: 30.98 KG/M2 | OXYGEN SATURATION: 98 % | TEMPERATURE: 97.3 F | WEIGHT: 209.2 LBS | DIASTOLIC BLOOD PRESSURE: 84 MMHG | SYSTOLIC BLOOD PRESSURE: 122 MMHG

## 2025-03-07 DIAGNOSIS — J34.9 SINUS PROBLEM: ICD-10-CM

## 2025-03-07 DIAGNOSIS — J34.829 NASAL VALVE COLLAPSE: ICD-10-CM

## 2025-03-07 DIAGNOSIS — J32.9 RECURRENT SINUSITIS: ICD-10-CM

## 2025-03-07 DIAGNOSIS — Z01.818 PRE-OP TESTING: ICD-10-CM

## 2025-03-07 DIAGNOSIS — Z01.818 PREOP EXAMINATION: Primary | ICD-10-CM

## 2025-03-07 DIAGNOSIS — R09.81 CHRONIC NASAL CONGESTION: ICD-10-CM

## 2025-03-07 LAB
ANION GAP SERPL CALCULATED.3IONS-SCNC: 9 MMOL/L (ref 4–13)
BUN SERPL-MCNC: 17 MG/DL (ref 5–25)
CALCIUM SERPL-MCNC: 9.9 MG/DL (ref 8.4–10.2)
CHLORIDE SERPL-SCNC: 101 MMOL/L (ref 96–108)
CO2 SERPL-SCNC: 31 MMOL/L (ref 21–32)
CREAT SERPL-MCNC: 1.07 MG/DL (ref 0.6–1.3)
GFR SERPL CREATININE-BSD FRML MDRD: 76 ML/MIN/1.73SQ M
GLUCOSE SERPL-MCNC: 98 MG/DL (ref 65–140)
POTASSIUM SERPL-SCNC: 4.1 MMOL/L (ref 3.5–5.3)
SODIUM SERPL-SCNC: 141 MMOL/L (ref 135–147)

## 2025-03-07 PROCEDURE — 85025 COMPLETE CBC W/AUTO DIFF WBC: CPT

## 2025-03-07 PROCEDURE — 80048 BASIC METABOLIC PNL TOTAL CA: CPT

## 2025-03-07 PROCEDURE — 36415 COLL VENOUS BLD VENIPUNCTURE: CPT

## 2025-03-07 PROCEDURE — 99213 OFFICE O/P EST LOW 20 MIN: CPT | Performed by: FAMILY MEDICINE

## 2025-03-07 NOTE — PROGRESS NOTES
Pre-operative Clearance  Name: Omar Henry Jr.      : 1965      MRN: [id number]  Encounter Provider: Ed Damon MD  Encounter Date: 3/7/2025   Encounter department: [facility]    Assessment & Plan  Preop examination  Patient is medically optimized for upcoming procedure on 3/18/2025  EKG does not demonstrate any significant cardiovascular disease  Patient will complete outstanding blood work including CBC and BMP ordered by ENT, assume no significant change in renal function and no significant anemia, hemoglobin less than 9.0 patient may proceed surgery       Sinus problem         Pre-operative Clearance:     Revised Cardiac Risk Index:  RCI RISK CLASS I (0 risk factors, risk of major cardiac complications approximately 0.5%)    Clearance:  Patient is medically optimized (CLEARED) for proposed surgery without any additional cardiac testing.      Medication Instructions:   - Avoid herbs or non-directed vitamins one week prior to surgery    - ACE Inhibitors or ARBs: Continue this medication up to the evening before surgery/procedure, but do not take the morning of the day of surgery.  - Antiepileptic meds: Continue to take this medication on your normal schedule.  - Hyperlipidemia meds: Continue to take this medication on your normal schedule.  - Thyroid meds: Continue to take this medication on your normal schedule.       History of Present Illness     Valentin is a 59-year-old male patient presents for preoperative evaluation for upcoming ENT surgery    Pre-op Exam  Surgery: Endoscopic sinus surgery  Anticipated Date of Surgery: 3/18/25  Surgeon: Omar Henry Jr    Previous history of bleeding disorders or clots?: No  Previous Anesthesia reaction?: No  Prolonged steroid use in the last 6 months?: No    Assessment of Cardiac Risk:   - Unstable or severe angina or MI in the last 6 weeks or history of stent placement in the last year?: No   - Decompensated heart failure (e.g. New  onset heart failure, NYHA  Class IV heart failure, or worsening existing heart failure)?: No  - Significant arrhythmias such as high grade AV block, symptomatic ventricular arrhythmia, newly recognized ventricular tachycardia, supraventricular tachycardia with resting heart rate >100, or symptomatic bradycardia?: No  - Severe heart valve disease including aortic stenosis or symptomatic mitral stenosis?: No      Pre-operative Risk Factors:  Elevated-risk surgery: No    History of cerebrovascular disease: No    History of ischemic heart disease: No  Pre-operative treatment with insulin: No  Pre-operative creatinine >2 mg/dL: No    History of congestive heart failure: No    Duke Activity Status Index (DASI):   DASI Total Score: 58.2  METs: 9.9    Review of Systems   Constitutional:  Negative for chills and fever.   HENT:  Negative for congestion.    Respiratory:  Negative for shortness of breath.    Cardiovascular:  Negative for chest pain.   Gastrointestinal:  Negative for abdominal pain.   Neurological:  Negative for dizziness, light-headedness and headaches.   Psychiatric/Behavioral:  Negative for sleep disturbance.        Past Medical History   Past Medical History:   Diagnosis Date    Allergic rhinitis Age 21    Chronic sinusitis     LAST ASSESSED: [id number]    Closed fracture of lower jaw bone (HCC)     Colon polyp     COVID     January 2022    Disease of thyroid gland     Ear problems     Ringing in the ears and loss of hearing.    Hyperlipidemia     Hypertension     Nasal congestion     Nasal obstruction     Yes, increaseling over time since 2012 PPP surgery.    Seasonal allergies     Seasonal allergies     Sleep apnea     Doesn't require cpap post UPP surgery    Sleep difficulties     Having to mouth breath while sleeping in prder to be able to get enough air.    Tinnitus     More so in the last 5 years or so.     Past Surgical History:   Procedure Laterality Date    COLONOSCOPY      HERNIA REPAIR       MANDIBLE FRACTURE SURGERY      as a child    [place] COLONOSCOPY FLX DX W/COLLJ SPEC WHEN PFRMD N/A 2/20/2017    Procedure: COLONOSCOPY;  Surgeon: Sylvester Simpson MD;  [facility]: [facility];  Service: Gastroenterology    [place] RINSJ RPTD BICEPS/TRICEPS TDN DSTL W/WO TDN GRF  8/31/2017    Procedure: RIGHT DISTAL BICEPS TENDON REPAIR;  Surgeon: Jhoan Britt MD;  [facility];  Service: Orthopedics    WV RPR UMBILICAL HRNA 5 YRS/> REDUCIBLE N/A 11/4/2021    Procedure: UMBILICAL HERNIA REPAIR;  Surgeon: Zain Peña MD;  [facility];  Service: General    SINUS SURGERY      TONSILLECTOMY      TONSILLECTOMY AND ADENOIDECTOMY      UVULOPALATOPHARYNGOPLASTY      WISDOM TOOTH EXTRACTION       Family History   Problem Relation Age of Onset    Diabetes Mother     Hypertension Mother     Cancer Mother         Same father as grandfather who changed his name.    Heart attack Father 50        ACUTE MI    Cardiomyopathy Father     Heart disease Father     Heart failure Father         Heart attack at 50 and heart transplant at 51. Passed away at 65    Hypertension Brother     Drug abuse Brother     Cancer Maternal Grandfather     Colon cancer Maternal Grandfather     Stroke Paternal Grandfather         SYNDROME    Anuerysm Neg Hx     Clotting disorder Neg Hx     Coronary artery disease Neg Hx     Sudden death Neg Hx         scd     Social History     Tobacco Use    Smoking status: Never    Smokeless tobacco: Never   Vaping Use    Vaping status: Never Used   Substance and Sexual Activity    Alcohol use: Not Currently    Drug use: No    Sexual activity: Yes     Partners: Female     Birth control/protection: Male Sterilization     Current Outpatient Medications on File Prior to Visit   Medication Sig    Cholecalciferol (VITAMIN D3) 1000 units CAPS Take by mouth    co-enzyme Q-10 100 mg capsule Take 1 capsule (100 mg total) by mouth 2 (two) times a day    ezetimibe (ZETIA) 10 mg tablet Take 1 tablet (10 mg total) by  "mouth daily    fluticasone (FLONASE) 50 mcg/act nasal spray SPRAY 2 SPRAYS INTO EACH NOSTRIL 2 TIMES A DAY.    gabapentin (Neurontin) 100 mg capsule Take 1 capsule (100 mg total) by mouth daily at bedtime    levocetirizine (XYZAL) 5 MG tablet Take 1 tablet (5 mg total) by mouth every evening    levothyroxine 125 mcg tablet TAKE 1 TABLET (125 MCG TOTAL) BY MOUTH DAILY BEFORE BREAKFAST 30 MINUTES PRIOR    lisinopril (ZESTRIL) 5 mg tablet Take 1 tablet (5 mg total) by mouth daily    Magnesium 100 MG CAPS Take by mouth daily    Multiple Vitamin (DAILY VALUE MULTIVITAMIN) TABS Take by mouth    omega-3-acid ethyl esters (LOVAZA) 1 g capsule Take 2 capsules (2 g total) by mouth 2 (two) times a day    tadalafil (CIALIS) 20 MG tablet Take 1 tablet (20 mg total) by mouth daily as needed for erectile dysfunction     Allergies   Allergen Reactions    Dust Mite Extract Sneezing     --sinus infection     Other Other (See Comments)     Annotation - [id number]: Hayfever-sinus infection      Objective   /84 (BP Location: Right arm, Patient Position: Sitting, Cuff Size: Large)   Pulse 86   Temp (!) 97.3 °F (36.3 °C) (Temporal)   Ht 5' 9\" (1.753 m)   Wt 94.9 kg (209 lb 3.2 oz)   SpO2 98%   BMI 30.89 kg/m²     Physical Exam  Vitals reviewed.   Constitutional:       General: He is not in acute distress.     Appearance: Normal appearance. He is not ill-appearing, toxic-appearing or diaphoretic.   Cardiovascular:      Rate and Rhythm: Normal rate.      Pulses: Normal pulses.   Pulmonary:      Effort: Pulmonary effort is normal.   Musculoskeletal:         General: No swelling or deformity.   Skin:     General: Skin is warm and dry.      Capillary Refill: Capillary refill takes less than 2 seconds.      Coloration: Skin is not jaundiced.   Neurological:      General: No focal deficit present.      Mental Status: He is alert and oriented to person, place, and time.   Psychiatric:         Mood and Affect: Mood normal. "             Ed Damon MD

## 2025-03-08 LAB
BASOPHILS # BLD AUTO: 0.06 THOUSANDS/ÂΜL (ref 0–0.1)
BASOPHILS NFR BLD AUTO: 1 % (ref 0–1)
EOSINOPHIL # BLD AUTO: 0.19 THOUSAND/ÂΜL (ref 0–0.61)
EOSINOPHIL NFR BLD AUTO: 2 % (ref 0–6)
ERYTHROCYTE [DISTWIDTH] IN BLOOD BY AUTOMATED COUNT: 12.9 % (ref 11.6–15.1)
HCT VFR BLD AUTO: 41.4 % (ref 36.5–49.3)
HGB BLD-MCNC: 14.2 G/DL (ref 12–17)
IMM GRANULOCYTES # BLD AUTO: 0.03 THOUSAND/UL (ref 0–0.2)
IMM GRANULOCYTES NFR BLD AUTO: 0 % (ref 0–2)
LYMPHOCYTES # BLD AUTO: 1.91 THOUSANDS/ÂΜL (ref 0.6–4.47)
LYMPHOCYTES NFR BLD AUTO: 21 % (ref 14–44)
MCH RBC QN AUTO: 30.5 PG (ref 26.8–34.3)
MCHC RBC AUTO-ENTMCNC: 34.3 G/DL (ref 31.4–37.4)
MCV RBC AUTO: 89 FL (ref 82–98)
MONOCYTES # BLD AUTO: 0.66 THOUSAND/ÂΜL (ref 0.17–1.22)
MONOCYTES NFR BLD AUTO: 7 % (ref 4–12)
NEUTROPHILS # BLD AUTO: 6.32 THOUSANDS/ÂΜL (ref 1.85–7.62)
NEUTS SEG NFR BLD AUTO: 69 % (ref 43–75)
NRBC BLD AUTO-RTO: 0 /100 WBCS
PLATELET # BLD AUTO: 232 THOUSANDS/UL (ref 149–390)
PMV BLD AUTO: 10.9 FL (ref 8.9–12.7)
RBC # BLD AUTO: 4.66 MILLION/UL (ref 3.88–5.62)
WBC # BLD AUTO: 9.17 THOUSAND/UL (ref 4.31–10.16)

## 2025-03-10 ENCOUNTER — OFFICE VISIT (OUTPATIENT)
Dept: URGENT CARE | Facility: MEDICAL CENTER | Age: 60
End: 2025-03-10
Payer: COMMERCIAL

## 2025-03-10 VITALS
WEIGHT: 209 LBS | OXYGEN SATURATION: 97 % | BODY MASS INDEX: 30.86 KG/M2 | TEMPERATURE: 97.6 F | HEART RATE: 64 BPM | DIASTOLIC BLOOD PRESSURE: 74 MMHG | SYSTOLIC BLOOD PRESSURE: 128 MMHG | RESPIRATION RATE: 18 BRPM

## 2025-03-10 DIAGNOSIS — R05.1 ACUTE COUGH: Primary | ICD-10-CM

## 2025-03-10 PROCEDURE — 93000 ELECTROCARDIOGRAM COMPLETE: CPT | Performed by: FAMILY MEDICINE

## 2025-03-10 PROCEDURE — 87636 SARSCOV2 & INF A&B AMP PRB: CPT | Performed by: PHYSICIAN ASSISTANT

## 2025-03-10 PROCEDURE — 99213 OFFICE O/P EST LOW 20 MIN: CPT | Performed by: PHYSICIAN ASSISTANT

## 2025-03-10 RX ORDER — BENZONATATE 100 MG/1
100 CAPSULE ORAL 3 TIMES DAILY PRN
Qty: 20 CAPSULE | Refills: 0 | Status: SHIPPED | OUTPATIENT
Start: 2025-03-10

## 2025-03-10 RX ORDER — AZITHROMYCIN 250 MG/1
TABLET, FILM COATED ORAL
Qty: 6 TABLET | Refills: 0 | Status: SHIPPED | OUTPATIENT
Start: 2025-03-10 | End: 2025-03-14

## 2025-03-10 NOTE — PATIENT INSTRUCTIONS
Cough  Zithromax as directed  Tessalon pearls as needed for cough  Follow up with PCP in 3-5 days.  Proceed to  ER if symptoms worsen.

## 2025-03-10 NOTE — PROGRESS NOTES
[facility]        NAME: Omar Henry Jr. is a 59 y.o. male  : 1965    MRN: [id number]  DATE: March 10, 2025  TIME: 10:21 AM    Assessment and Plan   Acute cough [R05.1]  1. Acute cough  Covid/Flu-Office Collect    azithromycin (ZITHROMAX) 250 mg tablet    benzonatate (TESSALON PERLES) 100 mg capsule            Patient Instructions     Cough  Zithromax as directed  Tessalon pearls as needed for cough  Follow up with PCP in 3-5 days.  Proceed to  ER if symptoms worsen.    Chief Complaint     Chief Complaint   Patient presents with    Cold Like Symptoms     Pt. With cough, congestion, sinus pressure, body aches, fatigue that began 3 days ago.          History of Present Illness       58 y/o male who presents c/o cough productive of yellow sputum, congestion, sinus pressure/ pain x 5 days. Patient states he has been taking over the counter medications with no relief. Has had similar symptoms in the past with sinus infections.        Review of Systems   Review of Systems   Constitutional: Negative.    HENT:  Positive for congestion, sinus pressure and sinus pain. Negative for dental problem, drooling, ear pain, postnasal drip, rhinorrhea, sore throat, trouble swallowing and voice change.    Eyes: Negative.    Respiratory:  Positive for cough. Negative for apnea, choking, chest tightness, shortness of breath, wheezing and stridor.    Cardiovascular: Negative.  Negative for chest pain.         Current Medications       Current Outpatient Medications:     azithromycin (ZITHROMAX) 250 mg tablet, Take 2 tablets today then 1 tablet daily x 4 days, Disp: 6 tablet, Rfl: 0    benzonatate (TESSALON PERLES) 100 mg capsule, Take 1 capsule (100 mg total) by mouth 3 (three) times a day as needed for cough, Disp: 20 capsule, Rfl: 0    Cholecalciferol (VITAMIN D3) 1000 units CAPS, Take by mouth, Disp: , Rfl:     co-enzyme Q-10 100 mg capsule, Take 1 capsule (100 mg total) by mouth 2 (two) times a day, Disp: , Rfl:      ezetimibe (ZETIA) 10 mg tablet, Take 1 tablet (10 mg total) by mouth daily, Disp: 90 tablet, Rfl: 3    fluticasone (FLONASE) 50 mcg/act nasal spray, SPRAY 2 SPRAYS INTO EACH NOSTRIL 2 TIMES A DAY., Disp: 48 mL, Rfl: 1    gabapentin (Neurontin) 100 mg capsule, Take 1 capsule (100 mg total) by mouth daily at bedtime, Disp: 90 capsule, Rfl: 1    levocetirizine (XYZAL) 5 MG tablet, Take 1 tablet (5 mg total) by mouth every evening, Disp: 90 tablet, Rfl: 1    levothyroxine 125 mcg tablet, TAKE 1 TABLET (125 MCG TOTAL) BY MOUTH DAILY BEFORE BREAKFAST 30 MINUTES PRIOR, Disp: 90 tablet, Rfl: 1    lisinopril (ZESTRIL) 5 mg tablet, Take 1 tablet (5 mg total) by mouth daily, Disp: 90 tablet, Rfl: 3    Magnesium 100 MG CAPS, Take by mouth daily, Disp: , Rfl:     Multiple Vitamin (DAILY VALUE MULTIVITAMIN) TABS, Take by mouth, Disp: , Rfl:     omega-3-acid ethyl esters (LOVAZA) 1 g capsule, Take 2 capsules (2 g total) by mouth 2 (two) times a day, Disp: 360 capsule, Rfl: 1    tadalafil (CIALIS) 20 MG tablet, Take 1 tablet (20 mg total) by mouth daily as needed for erectile dysfunction, Disp: 30 tablet, Rfl: 3    Current Allergies     Allergies as of 03/10/2025 - Reviewed 03/10/2025   Allergen Reaction Noted    Dust mite extract Sneezing 06/13/2014    Other Other (See Comments) 06/13/2014            The following portions of the patient's history were reviewed and updated as appropriate: allergies, current medications, past family history, past medical history, past social history, past surgical history and problem list.     Past Medical History:   Diagnosis Date    Allergic rhinitis Age 21    Chronic sinusitis     LAST ASSESSED: [id number]    Closed fracture of lower jaw bone (HCC)     Colon polyp     COVID     January 2022    Disease of thyroid gland     Ear problems     Ringing in the ears and loss of hearing.    Hyperlipidemia     Hypertension     Nasal congestion     Nasal obstruction     Yes, increaseling over time since  2012 PPP surgery.    Seasonal allergies     Seasonal allergies     Sleep apnea     Doesn't require cpap post UPP surgery    Sleep difficulties     Having to mouth breath while sleeping in prder to be able to get enough air.    Tinnitus     More so in the last 5 years or so.       Past Surgical History:   Procedure Laterality Date    COLONOSCOPY      HERNIA REPAIR      MANDIBLE FRACTURE SURGERY      as a child    [place] COLONOSCOPY FLX DX W/COLLJ SPEC WHEN PFRMD N/A 2/20/2017    Procedure: COLONOSCOPY;  Surgeon: Sylvester Simpson MD;  [facility];  Service: Gastroenterology    [place] RINSJ RPTD BICEPS/TRICEPS TDN DSTL W/WO TDN GRF  8/31/2017    Procedure: RIGHT DISTAL BICEPS TENDON REPAIR;  Surgeon: Jhoan Britt MD;  [facility];  Service: Orthopedics    [place] RPR UMBILICAL HRNA 5 YRS/> REDUCIBLE N/A 11/4/2021    Procedure: UMBILICAL HERNIA REPAIR;  Surgeon: Zain Peña MD;  [facility];  Service: General    SINUS SURGERY      TONSILLECTOMY      TONSILLECTOMY AND ADENOIDECTOMY      UVULOPALATOPHARYNGOPLASTY      WISDOM TOOTH EXTRACTION         Family History   Problem Relation Age of Onset    Diabetes Mother     Hypertension Mother     Cancer Mother         Same father as grandfather who changed his name.    Heart attack Father 50        ACUTE MI    Cardiomyopathy Father     Heart disease Father     Heart failure Father         Heart attack at 50 and heart transplant at 51. Passed away at 65    Hypertension Brother     Drug abuse Brother     Cancer Maternal Grandfather     Colon cancer Maternal Grandfather     Stroke Paternal Grandfather         SYNDROME    Anuerysm Neg Hx     Clotting disorder Neg Hx     Coronary artery disease Neg Hx     Sudden death Neg Hx         scd         Medications have been verified.        Objective   /74   Pulse 64   Temp 97.6 °F (36.4 °C)   Resp 18   Wt 94.8 kg (209 lb)   SpO2 97%   BMI 30.86 kg/m²        Physical Exam     Physical Exam  Constitutional:        General: He is not in acute distress.     Appearance: Normal appearance. He is well-developed. He is not diaphoretic.   HENT:      Head: Normocephalic and atraumatic.      Right Ear: Hearing, tympanic membrane, ear canal and external ear normal.      Left Ear: Hearing, tympanic membrane, ear canal and external ear normal.      Nose: Rhinorrhea present.      Right Sinus: No maxillary sinus tenderness or frontal sinus tenderness.      Left Sinus: No maxillary sinus tenderness or frontal sinus tenderness.      Mouth/Throat:      Mouth: Mucous membranes are moist.      Pharynx: Oropharynx is clear. Uvula midline. Posterior oropharyngeal erythema present. No oropharyngeal exudate.   Cardiovascular:      Rate and Rhythm: Normal rate and regular rhythm.      Pulses: Normal pulses.      Heart sounds: Normal heart sounds.   Pulmonary:      Effort: Pulmonary effort is normal. No respiratory distress.      Breath sounds: Normal breath sounds. No stridor. No wheezing, rhonchi or rales.   Chest:      Chest wall: No tenderness.   Musculoskeletal:      Cervical back: Normal range of motion and neck supple.   Lymphadenopathy:      Cervical: No cervical adenopathy.   Neurological:      Mental Status: He is alert.

## 2025-03-11 ENCOUNTER — RESULTS FOLLOW-UP (OUTPATIENT)
Dept: FAMILY MEDICINE CLINIC | Facility: CLINIC | Age: 60
End: 2025-03-11

## 2025-03-11 LAB
FLUAV RNA RESP QL NAA+PROBE: NEGATIVE
FLUBV RNA RESP QL NAA+PROBE: NEGATIVE
SARS-COV-2 RNA RESP QL NAA+PROBE: NEGATIVE

## 2025-03-12 ENCOUNTER — RESULTS FOLLOW-UP (OUTPATIENT)
Dept: URGENT CARE | Facility: MEDICAL CENTER | Age: 60
End: 2025-03-12

## 2025-03-13 PROBLEM — Z12.5 SCREENING FOR PROSTATE CANCER: Status: RESOLVED | Noted: 2025-02-11 | Resolved: 2025-03-13

## 2025-04-07 ENCOUNTER — HOSPITAL ENCOUNTER (OUTPATIENT)
Dept: NON INVASIVE DIAGNOSTICS | Facility: CLINIC | Age: 60
Discharge: HOME | End: 2025-04-07
Payer: COMMERCIAL

## 2025-04-07 ENCOUNTER — RESULTS FOLLOW-UP (OUTPATIENT)
Dept: CARDIOLOGY CLINIC | Facility: CLINIC | Age: 60
End: 2025-04-07

## 2025-04-07 VITALS
BODY MASS INDEX: 30.96 KG/M2 | SYSTOLIC BLOOD PRESSURE: 128 MMHG | HEART RATE: 64 BPM | DIASTOLIC BLOOD PRESSURE: 74 MMHG | HEIGHT: 69 IN | WEIGHT: 209 LBS

## 2025-04-07 LAB
AORTIC ROOT: 3.7 CM
ASCENDING AORTA: 3.7 CM
BSA FOR ECHO PROCEDURE: 2.1 M2
E WAVE DECELERATION TIME: 125 MS
E/A RATIO: 1.27
FRACTIONAL SHORTENING: 35 (ref 28–44)
INTERVENTRICULAR SEPTUM IN DIASTOLE (PARASTERNAL SHORT AXIS VIEW): 1.5 CM
INTERVENTRICULAR SEPTUM: 1.5 CM (ref 0.6–1.1)
LAAS-AP2: 18.5 CM2
LAAS-AP4: 19.9 CM2
LEFT ATRIUM SIZE: 4.6 CM
LEFT ATRIUM VOLUME (MOD BIPLANE): 62 ML
LEFT ATRIUM VOLUME INDEX (MOD BIPLANE): 29.5 ML/M2
LEFT INTERNAL DIMENSION IN SYSTOLE: 2.6 CM (ref 2.1–4)
LEFT VENTRICULAR INTERNAL DIMENSION IN DIASTOLE: 4 CM (ref 3.5–6)
LEFT VENTRICULAR POSTERIOR WALL IN END DIASTOLE: 1.2 CM
LEFT VENTRICULAR STROKE VOLUME: 44 ML
LV EF US.2D.A4C+ESTIMATED: 67 %
LVSV (TEICH): 44 ML
MV E'TISSUE VEL-LAT: 14 CM/S
MV E'TISSUE VEL-SEP: 10 CM/S
MV PEAK A VEL: 0.52 M/S
MV PEAK E VEL: 66 CM/S
MV STENOSIS PRESSURE HALF TIME: 36 MS
MV VALVE AREA P 1/2 METHOD: 6.11
RIGHT ATRIUM AREA SYSTOLE A4C: 16.6 CM2
RIGHT VENTRICLE ID DIMENSION: 3.8 CM
SL CV LEFT ATRIUM LENGTH A2C: 4.7 CM
SL CV LV EF: 60
SL CV PED ECHO LEFT VENTRICLE DIASTOLIC VOLUME (MOD BIPLANE) 2D: 69 ML
SL CV PED ECHO LEFT VENTRICLE SYSTOLIC VOLUME (MOD BIPLANE) 2D: 25 ML
TR MAX PG: 16 MMHG
TR PEAK VELOCITY: 2 M/S
TRICUSPID ANNULAR PLANE SYSTOLIC EXCURSION: 2 CM
TRICUSPID VALVE PEAK REGURGITATION VELOCITY: 1.97 M/S

## 2025-04-07 PROCEDURE — 93306 TTE W/DOPPLER COMPLETE: CPT | Performed by: INTERNAL MEDICINE

## 2025-04-07 PROCEDURE — 93306 TTE W/DOPPLER COMPLETE: CPT

## 2025-04-29 DIAGNOSIS — M79.604 BILATERAL LEG PAIN: ICD-10-CM

## 2025-04-29 DIAGNOSIS — M79.605 BILATERAL LEG PAIN: ICD-10-CM

## 2025-04-30 RX ORDER — GABAPENTIN 100 MG/1
100 CAPSULE ORAL
Qty: 90 CAPSULE | Refills: 1 | Status: SHIPPED | OUTPATIENT
Start: 2025-04-30

## 2025-05-08 DIAGNOSIS — E78.1 ESSENTIAL HYPERTRIGLYCERIDEMIA: ICD-10-CM

## 2025-05-08 RX ORDER — OMEGA-3-ACID ETHYL ESTERS 1 G/1
2 CAPSULE, LIQUID FILLED ORAL 2 TIMES DAILY
Qty: 360 CAPSULE | Refills: 1 | Status: SHIPPED | OUTPATIENT
Start: 2025-05-08

## 2025-06-04 DIAGNOSIS — E03.8 OTHER SPECIFIED HYPOTHYROIDISM: ICD-10-CM

## 2025-06-04 DIAGNOSIS — J30.1 SEASONAL ALLERGIC RHINITIS DUE TO POLLEN: ICD-10-CM

## 2025-06-04 RX ORDER — LEVOCETIRIZINE DIHYDROCHLORIDE 5 MG/1
5 TABLET, FILM COATED ORAL EVERY EVENING
Qty: 30 TABLET | Refills: 5 | Status: SHIPPED | OUTPATIENT
Start: 2025-06-04

## 2025-06-04 RX ORDER — LEVOTHYROXINE SODIUM 125 UG/1
125 TABLET ORAL
Qty: 30 TABLET | Refills: 5 | Status: SHIPPED | OUTPATIENT
Start: 2025-06-04

## 2025-07-05 DIAGNOSIS — J30.1 SEASONAL ALLERGIC RHINITIS DUE TO POLLEN: ICD-10-CM

## 2025-07-06 RX ORDER — LEVOCETIRIZINE DIHYDROCHLORIDE 5 MG/1
5 TABLET, FILM COATED ORAL EVERY EVENING
Qty: 90 TABLET | Refills: 2 | Status: SHIPPED | OUTPATIENT
Start: 2025-07-06

## (undated) DEVICE — DRAPE SHEET THREE QUARTER

## (undated) DEVICE — ADHESIVE SKN CLSR HISTOACRYL FLEX 0.5ML LF

## (undated) DEVICE — DRAPE EQUIPMENT RF WAND

## (undated) DEVICE — PAD GROUNDING ADULT

## (undated) DEVICE — LIGHT HANDLE COVER SLEEVE DISP BLUE STELLAR

## (undated) DEVICE — INTENDED FOR TISSUE SEPARATION, AND OTHER PROCEDURES THAT REQUIRE A SHARP SURGICAL BLADE TO PUNCTURE OR CUT.: Brand: BARD-PARKER SAFETY BLADES SIZE 15, STERILE

## (undated) DEVICE — SUT VICRYL 3-0 SH 27 IN J416H

## (undated) DEVICE — GAUZE SPONGES,16 PLY: Brand: CURITY

## (undated) DEVICE — VIAL DECANTER

## (undated) DEVICE — SUT MONOCRYL 4-0 PS-2 18 IN Y496G

## (undated) DEVICE — OCCLUSIVE GAUZE STRIP,3% BISMUTH TRIBROMOPHENATE IN PETROLATUM BLEND: Brand: XEROFORM

## (undated) DEVICE — SUT VICRYL 2-0 REEL 54 IN J286G

## (undated) DEVICE — GLOVE SRG BIOGEL ECLIPSE 7

## (undated) DEVICE — GLOVE INDICATOR PI UNDERGLOVE SZ 8.5 BLUE

## (undated) DEVICE — STERILE ALLENTOWN HAND PACK: Brand: CARDINAL HEALTH

## (undated) DEVICE — NEEDLE 22 G X 1 1/2 SAFETY

## (undated) DEVICE — TUBING SUCTION 5MM X 12 FT

## (undated) DEVICE — BETHLEHEM UNIVERSAL MINOR GEN: Brand: CARDINAL HEALTH

## (undated) DEVICE — ACE WRAP 4 IN UNSTERILE

## (undated) DEVICE — CHLORAPREP HI-LITE 26ML ORANGE

## (undated) DEVICE — CUFF TOURNIQUET 18 X 4 IN QUICK CONNECT DISP 1 BLADDER

## (undated) DEVICE — PADDING CAST 4 IN  COTTON STRL

## (undated) DEVICE — DRAPE C-ARM X-RAY

## (undated) DEVICE — PENCIL ELECTROSURG E-Z CLEAN -0035H

## (undated) DEVICE — GLOVE SRG BIOGEL 8.5

## (undated) DEVICE — SUT VICRYL 1 CT-1 27 IN J261H

## (undated) DEVICE — ACE WRAP 4 IN STERILE

## (undated) DEVICE — COBAN 4 IN STERILE

## (undated) DEVICE — SCD SEQUENTIAL COMPRESSION COMFORT SLEEVE MEDIUM KNEE LENGTH: Brand: KENDALL SCD

## (undated) DEVICE — REM POLYHESIVE ADULT PATIENT RETURN ELECTRODE: Brand: VALLEYLAB

## (undated) DEVICE — STOCKINETTE REGULAR

## (undated) DEVICE — INTENT TO BE USED WITH SUTURE MATERIAL FOR TISSUE CLOSURE: Brand: RICHARD-ALLAN® NEEDLE 1/2 CIRCLE TAPER

## (undated) DEVICE — ADHESIVE SKIN HIGH VISCOSITY EXOFIN 1ML